# Patient Record
Sex: FEMALE | NOT HISPANIC OR LATINO | URBAN - METROPOLITAN AREA
[De-identification: names, ages, dates, MRNs, and addresses within clinical notes are randomized per-mention and may not be internally consistent; named-entity substitution may affect disease eponyms.]

---

## 2018-11-13 PROBLEM — Z00.00 ENCOUNTER FOR PREVENTIVE HEALTH EXAMINATION: Status: ACTIVE | Noted: 2018-11-13

## 2018-11-16 ENCOUNTER — OUTPATIENT (OUTPATIENT)
Dept: OUTPATIENT SERVICES | Facility: HOSPITAL | Age: 63
LOS: 1 days | End: 2018-11-16
Payer: COMMERCIAL

## 2018-11-16 ENCOUNTER — APPOINTMENT (OUTPATIENT)
Dept: CT IMAGING | Facility: CLINIC | Age: 63
End: 2018-11-16
Payer: COMMERCIAL

## 2018-11-16 DIAGNOSIS — Z00.8 ENCOUNTER FOR OTHER GENERAL EXAMINATION: ICD-10-CM

## 2018-11-16 DIAGNOSIS — C54.1 MALIGNANT NEOPLASM OF ENDOMETRIUM: ICD-10-CM

## 2018-11-16 PROCEDURE — 82565 ASSAY OF CREATININE: CPT

## 2018-11-16 PROCEDURE — 74177 CT ABD & PELVIS W/CONTRAST: CPT | Mod: 26

## 2018-11-16 PROCEDURE — 71260 CT THORAX DX C+: CPT

## 2018-11-16 PROCEDURE — 71260 CT THORAX DX C+: CPT | Mod: 26

## 2018-11-16 PROCEDURE — 74177 CT ABD & PELVIS W/CONTRAST: CPT

## 2018-11-20 ENCOUNTER — APPOINTMENT (OUTPATIENT)
Dept: GYNECOLOGIC ONCOLOGY | Facility: CLINIC | Age: 63
End: 2018-11-20
Payer: COMMERCIAL

## 2018-11-20 VITALS — HEART RATE: 103 BPM | SYSTOLIC BLOOD PRESSURE: 142 MMHG | DIASTOLIC BLOOD PRESSURE: 79 MMHG

## 2018-11-20 VITALS — BODY MASS INDEX: 27.48 KG/M2 | WEIGHT: 140 LBS | HEIGHT: 60 IN

## 2018-11-20 PROCEDURE — 99205 OFFICE O/P NEW HI 60 MIN: CPT

## 2018-11-21 ENCOUNTER — OUTPATIENT (OUTPATIENT)
Dept: OUTPATIENT SERVICES | Facility: HOSPITAL | Age: 63
LOS: 1 days | End: 2018-11-21
Payer: COMMERCIAL

## 2018-11-21 DIAGNOSIS — N93.9 ABNORMAL UTERINE AND VAGINAL BLEEDING, UNSPECIFIED: ICD-10-CM

## 2018-11-23 ENCOUNTER — RESULT REVIEW (OUTPATIENT)
Age: 63
End: 2018-11-23

## 2018-11-23 PROCEDURE — 88321 CONSLTJ&REPRT SLD PREP ELSWR: CPT

## 2018-11-28 LAB — SURGICAL PATHOLOGY STUDY: SIGNIFICANT CHANGE UP

## 2018-11-29 ENCOUNTER — APPOINTMENT (OUTPATIENT)
Dept: HEMATOLOGY ONCOLOGY | Facility: CLINIC | Age: 63
End: 2018-11-29
Payer: COMMERCIAL

## 2018-11-29 ENCOUNTER — LABORATORY RESULT (OUTPATIENT)
Age: 63
End: 2018-11-29

## 2018-11-29 VITALS
SYSTOLIC BLOOD PRESSURE: 171 MMHG | DIASTOLIC BLOOD PRESSURE: 90 MMHG | HEART RATE: 97 BPM | TEMPERATURE: 98.4 F | BODY MASS INDEX: 27.88 KG/M2 | WEIGHT: 142 LBS | HEIGHT: 60 IN

## 2018-11-29 DIAGNOSIS — Z80.0 FAMILY HISTORY OF MALIGNANT NEOPLASM OF DIGESTIVE ORGANS: ICD-10-CM

## 2018-11-29 DIAGNOSIS — Z80.3 FAMILY HISTORY OF MALIGNANT NEOPLASM OF BREAST: ICD-10-CM

## 2018-11-29 LAB
ALBUMIN SERPL ELPH-MCNC: 4.7 G/DL
ALP BLD-CCNC: 91 U/L
ALT SERPL-CCNC: 17 U/L
ANION GAP SERPL CALC-SCNC: 13 MMOL/L
AST SERPL-CCNC: 17 U/L
BILIRUB SERPL-MCNC: 0.4 MG/DL
BUN SERPL-MCNC: 10 MG/DL
CALCIUM SERPL-MCNC: 9.8 MG/DL
CANCER AG125 SERPL-ACNC: 269 U/ML
CHLORIDE SERPL-SCNC: 103 MMOL/L
CO2 SERPL-SCNC: 24 MMOL/L
CREAT SERPL-MCNC: 0.7 MG/DL
GLUCOSE SERPL-MCNC: 125 MG/DL
HCT VFR BLD CALC: 42.7 %
HGB BLD-MCNC: 13.9 G/DL
MCHC RBC-ENTMCNC: 30.8 PG
MCHC RBC-ENTMCNC: 32.6 GM/DL
MCV RBC AUTO: 94.3 FL
PLATELET # BLD AUTO: 411 K/UL
POTASSIUM SERPL-SCNC: 4.3 MMOL/L
PROT SERPL-MCNC: 7.4 G/DL
RBC # BLD: 4.52 M/UL
RBC # FLD: 11.8 %
SODIUM SERPL-SCNC: 140 MMOL/L
WBC # FLD AUTO: 8.2 K/UL

## 2018-11-29 PROCEDURE — 99205 OFFICE O/P NEW HI 60 MIN: CPT

## 2018-11-29 NOTE — ASSESSMENT
[FreeTextEntry1] : Ms. TMI and her offsprings' questions were answered to their satisfaction. She  expressed her  understanding and willingness to comply with the above recommendations, and  will return to the office to start systemic chemotherapy next week.\par

## 2018-11-29 NOTE — HISTORY OF PRESENT ILLNESS
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 In short, patient has been complaining of postmenopausal, daily, painless vaginal bleed for half a year (continues to bleed vaginally presently). Denies changes in appetite or weight, or changes in bowel movement or bladder habits. Patient has not followed up regularly with GYN, does not recall ever being tested for HPV, or having an abnormal PAP. She has never had a colonoscopy, and her last mammogram was more than 5 years ago. 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 Both pelvic ultrasound ( ) and  CT C/A/P ( ) (see case synopsis), showed a mass involving endometrium and cervix ; both studies confirming diffuse, significant retroperitoneal and pelvic lymphadenopathy, but no evidence of distant metastatic disease. Reports family history of maternal breast cancer and paternal colon cancer ( both parents lived in their 90s). Patient is , but ex-  is supportive. Continues to work as a . Accompanied by daughter, Aziza and son, Carlito. \par \par CASE SYNOPSIS:\par \par 2018:\par Cervical mass biopsy – poorly differentiated carcinoma, favoring endometrial origin.\par \par 2018:\par Pelvic US (performed at [facility]): uterus size 9.9 x 5.3x 4.4 cm, endometrial mucosa thickness 24 mm, cervix with lobular appearance and heterogenous echo texture. Adnexae unremarkable, but a heterogenous solid  mass, measuring 3.8 x 3.6 x 2.8 cm, is described adjacent to the right ovary.\par \par 2018:\par CT C/A/P: No evidence of distant metastases; significant distension endometrial cavity (16 mm in size), extending into enlarged bulky cervix, with questionable proximal vaginal extension. Significant, diffuse retroperitoneal and pelvic lymphadenopathy ( lymph nodes in right pelvis abutting the right ovary, as seen on pelvis ultrasound.\par \par 2018:\par Pathology review [facility]: poorly differentiated, endometrioid adenocarcinoma; IHC non- contributory; cannot distinguished cervical vs endometrial origin.\par  [de-identified] : poorly differentiated, endometrioid [de-identified] :  [FreeTextEntry1] : pending staging PET

## 2018-11-29 NOTE — OB HISTORY
[Definite:  ___ (Date)] : the last menstrual period was [unfilled] [Currently In Menopause] : currently in menopause [Menopause Age: ____] : age at menopause was [unfilled] [___] : Living: [unfilled]

## 2018-11-29 NOTE — REVIEW OF SYSTEMS
[Chest Pain] : no chest pain [Lower Ext Edema] : no lower extremity edema [Shortness Of Breath] : no shortness of breath [Cough] : no cough [Abdominal Pain] : no abdominal pain [Vomiting] : no vomiting [Dysmenorrhea/Abn Vaginal Bleeding] : dysmenorrhea/abnormal vaginal bleeding [Skin Rash] : no skin rash [Insomnia] : insomnia [Anxiety] : anxiety [Easy Bleeding] : no tendency for easy bleeding [Easy Bruising] : no tendency for easy bruising [Negative] : Heme/Lymph [de-identified] : cannot sleep since diagnosis due to anxiety

## 2018-11-29 NOTE — REASON FOR VISIT
[Initial Consultation] : an initial consultation [Family Member] : family member [FreeTextEntry2] : endometriod uterine cancer

## 2018-11-30 ENCOUNTER — OTHER (OUTPATIENT)
Age: 63
End: 2018-11-30

## 2018-11-30 LAB — FERRITIN SERPL-MCNC: 376 NG/ML

## 2018-12-03 PROBLEM — N93.9 VAGINAL BLEEDING, ABNORMAL: Status: RESOLVED | Noted: 2018-11-29 | Resolved: 2018-12-03

## 2018-12-03 PROBLEM — R93.89 THICKENED ENDOMETRIUM: Status: RESOLVED | Noted: 2018-11-20 | Resolved: 2018-12-03

## 2018-12-04 ENCOUNTER — APPOINTMENT (OUTPATIENT)
Dept: HEMATOLOGY ONCOLOGY | Facility: CLINIC | Age: 63
End: 2018-12-04

## 2018-12-04 ENCOUNTER — LABORATORY RESULT (OUTPATIENT)
Age: 63
End: 2018-12-04

## 2018-12-04 VITALS
DIASTOLIC BLOOD PRESSURE: 77 MMHG | HEART RATE: 106 BPM | TEMPERATURE: 98.7 F | HEIGHT: 60 IN | SYSTOLIC BLOOD PRESSURE: 145 MMHG

## 2018-12-04 LAB
HCT VFR BLD CALC: 40.8 %
HGB BLD-MCNC: 13.7 G/DL
MCHC RBC-ENTMCNC: 31 PG
MCHC RBC-ENTMCNC: 33.6 GM/DL
MCV RBC AUTO: 92.3 FL
PLATELET # BLD AUTO: 422 K/UL
RBC # BLD: 4.42 M/UL
RBC # FLD: 11.7 %
WBC # FLD AUTO: 10.4 K/UL

## 2018-12-05 LAB
ERYTHROCYTE [SEDIMENTATION RATE] IN BLOOD BY WESTERGREN METHOD: 21 MM/HR
FOLATE SERPL-MCNC: 10.7 NG/ML
IRON SATN MFR SERPL: 22 %
IRON SERPL-MCNC: 56 UG/DL
TIBC SERPL-MCNC: 255 UG/DL
UIBC SERPL-MCNC: 199 UG/DL
VIT B12 SERPL-MCNC: 234 PG/ML

## 2018-12-06 ENCOUNTER — APPOINTMENT (OUTPATIENT)
Dept: INFUSION THERAPY | Facility: CLINIC | Age: 63
End: 2018-12-06
Payer: COMMERCIAL

## 2018-12-06 VITALS
TEMPERATURE: 98.2 F | BODY MASS INDEX: 27.68 KG/M2 | HEIGHT: 60 IN | SYSTOLIC BLOOD PRESSURE: 162 MMHG | HEART RATE: 130 BPM | WEIGHT: 141 LBS | DIASTOLIC BLOOD PRESSURE: 94 MMHG

## 2018-12-06 DIAGNOSIS — R93.89 ABNORMAL FINDINGS ON DIAGNOSTIC IMAGING OF OTHER SPECIFIED BODY STRUCTURES: ICD-10-CM

## 2018-12-06 DIAGNOSIS — N93.9 ABNORMAL UTERINE AND VAGINAL BLEEDING, UNSPECIFIED: ICD-10-CM

## 2018-12-06 LAB — 25(OH)D3 SERPL-MCNC: 12.5 NG/ML

## 2018-12-06 PROCEDURE — 96417 CHEMO IV INFUS EACH ADDL SEQ: CPT

## 2018-12-06 PROCEDURE — 96413 CHEMO IV INFUSION 1 HR: CPT

## 2018-12-06 PROCEDURE — 96375 TX/PRO/DX INJ NEW DRUG ADDON: CPT

## 2018-12-06 PROCEDURE — 96367 TX/PROPH/DG ADDL SEQ IV INF: CPT

## 2018-12-06 PROCEDURE — 96415 CHEMO IV INFUSION ADDL HR: CPT

## 2018-12-06 PROCEDURE — 96372 THER/PROPH/DIAG INJ SC/IM: CPT | Mod: 59

## 2018-12-17 ENCOUNTER — APPOINTMENT (OUTPATIENT)
Dept: INFUSION THERAPY | Facility: CLINIC | Age: 63
End: 2018-12-17

## 2018-12-17 ENCOUNTER — APPOINTMENT (OUTPATIENT)
Dept: HEMATOLOGY ONCOLOGY | Facility: CLINIC | Age: 63
End: 2018-12-17
Payer: COMMERCIAL

## 2018-12-17 ENCOUNTER — LABORATORY RESULT (OUTPATIENT)
Age: 63
End: 2018-12-17

## 2018-12-17 VITALS
BODY MASS INDEX: 28.07 KG/M2 | TEMPERATURE: 98.3 F | WEIGHT: 143 LBS | DIASTOLIC BLOOD PRESSURE: 85 MMHG | HEIGHT: 60 IN | HEART RATE: 96 BPM | SYSTOLIC BLOOD PRESSURE: 166 MMHG

## 2018-12-17 DIAGNOSIS — I87.8 OTHER SPECIFIED DISORDERS OF VEINS: ICD-10-CM

## 2018-12-17 DIAGNOSIS — K21.9 GASTRO-ESOPHAGEAL REFLUX DISEASE W/OUT ESOPHAGITIS: ICD-10-CM

## 2018-12-17 LAB
HCT VFR BLD CALC: 39.2 %
HGB BLD-MCNC: 12.9 G/DL
MCHC RBC-ENTMCNC: 31 PG
MCHC RBC-ENTMCNC: 33 GM/DL
MCV RBC AUTO: 93.9 FL
PLATELET # BLD AUTO: 362 K/UL
RBC # BLD: 4.18 M/UL
RBC # FLD: 12.1 %
WBC # FLD AUTO: 18.5 K/UL

## 2018-12-17 PROCEDURE — 36415 COLL VENOUS BLD VENIPUNCTURE: CPT

## 2018-12-17 PROCEDURE — 96365 THER/PROPH/DIAG IV INF INIT: CPT

## 2018-12-17 PROCEDURE — 85025 COMPLETE CBC W/AUTO DIFF WBC: CPT

## 2018-12-17 PROCEDURE — 99215 OFFICE O/P EST HI 40 MIN: CPT | Mod: 25

## 2018-12-18 NOTE — PHYSICAL EXAM
[Normal] : affect appropriate [de-identified] : anicteric [de-identified] : no thrush or mucositis [de-identified] : no lymphadenopathy [de-identified] : S1S2 RRR,no obvious murmurs [de-identified] : no c/c/e [de-identified] : diffuse generalized rash, nonblanching, macular but confluent in spots - especially extremities causing sunburn like areas.  Of not confluent area was noted where IV was placed on left wrist and where OnPro device was placed on left upper arm.  Face spared although reddened cheeks (from Decadron).

## 2018-12-18 NOTE — RESULTS/DATA
[FreeTextEntry1] : WBC: 18.5  ANC: 15.1  Hgb: 12.9  Hct: 39.2   MCV: 93.9   Plts: 362\par segs: 58%  bands: 17%  (Neulasta effect)  lymphs: 11%  Eos: 3%\par CMP: pending

## 2018-12-18 NOTE — REVIEW OF SYSTEMS
[Patient Intake Form Reviewed] : Patient intake form was reviewed [Fatigue] : fatigue [Negative] : Allergic/Immunologic [FreeTextEntry9] : GCSF bone pains as in interval history [de-identified] : itchy rash as in interval history

## 2018-12-18 NOTE — HISTORY OF PRESENT ILLNESS
[Disease: _____________________] : Disease: [unfilled] [T: ___] : T[unfilled] [N: ___] : N[unfilled] [AJCC Stage: ____] : AJCC Stage: [unfilled] [de-identified] : STACEY TIM is a 62 y.o. who we are following for a stage IIIC endometrial cancer. \par 11/7/18 -The patient presented with a 4 - 5 month hx of PMB and saw GYN Dr. Montes De Oca (prior GYN visit >9 years ago). Exam showed large cervical mass, biopsy revealed a poorly differentiated carcinoma favoring endometrial origin.   [facility] review of slides favored endometrioid adenocarcinoma, but could not distinguish between endometrial or cervical carcinoma. \par 11/16/18 - CT C/A/P - No distant mets; significant distention endometrial cavity (16mm) extending into an enlarged bulky cervix, with questionable proximal vaginal extension.  Significant, diffuse retroperitoneal and pelvic LAD (LN's in right pelvis abutting the right ovary).\par 12/6/18 - Started chemotherapy with Taxol/Carbo Q21 [de-identified] : poorly differentiated carcinoma - favor endometrioid adenocarcinoma, but could not distinguish between endometrial or cervical carcinoma.  [de-identified] : [facility] sent 12/6/18 - Pending [de-identified] : The patient reports chemo initially went as expected.  She did well on D1.  Went to work on D2 and then was tired that evening.  D3, D4 and D5 she had GCSF induced bone pains that were quite bad.  Even had in ribs to the point where it was hard to take a deep breath for a little bit.  She did try Motrin but didn't help much.  She started feeling better by about D5, however about a week later on D7 she noticed an itchy rash developing.  On D9 (Saturday) she called service and spoke with Dr. Perez - had some Decadron at home (for Taxol premeds) and was told to take two 4mg tabs BID Sat and Sun.  She also took some Benadryl.  She states this did help the itching but the rash did get worse.  She was not sure what to do for the steroids today  - was told to come in to the office for evaluation.\par She did not have any problems with nausea/vomiting.  She did have some mild constipation immediately after the chemo - resolved with Miralax.  No further bowel issues. \par She denies any other changes in her family, medical, or social history since her last visit of 12/4/18.

## 2018-12-18 NOTE — REASON FOR VISIT
[Follow-Up Visit] : a follow-up [Urgent Visit] : an urgent  [Other: _____] : [unfilled] [FreeTextEntry2] : Endometrial Cancer - D11 C1 Taxol/Carbo - new rash

## 2018-12-18 NOTE — ASSESSMENT
[FreeTextEntry1] : The patient is a 63 y.o. with a stage IIIC endometrial carcinoma (presumably endometrioid adenocarcinoma, but could not r/o cervical primary).  Has significant retroperitoneal and pelvic lymphadenopathy.\par 12/6/18 - Started chemo with Taxol/Carbo Q21 - plan was to see how she responded.  If good response then try for definitive chemo/RT - however LAD too extensive at this time. \par Patient initially tolerated chemo as expected - major issue pain due to Neulasta, however ~ 1 week later she developed a diffuse generalized rash requiring steroids.\par 1. Derm - Rash quite impressive.  Patient seen along with Dr. Greer.  Photos taken.  Covers a large portion of her body - most of arms and thighs, scattered on chest/abdomen.  Also appears very red now.  \par Does appear to be c/w a drug rash - most likely culprit is Taxol. \par Review of literature suggests this is an exanthematous type of drug reaction.\par - "Cutaneous eruption that mimics a measles-like viral exanthem."\par  -"Sensitization occurs during administration or after completing course of drug; peak incidence at ninth day after administration. "\par - "Usually quite pruritic. Painful skin lesions suggest development of a more serious ACDR, such as toxic epidermal necrolysis (TEN)."\par - "In previously sensitized patient, eruption starts within 2 or 3 days after readministration of drug."\par - "Skin Lesions: Macules and/or papules, a few millimeters to 1 cm in size. Bright or “drug” red. Resolving lesions have hues of tan and purple. In time, lesions become confluent forming large macules, polycyclic/gyrate erythema, reticular eruptions, sheet-like erythema, erythroderma; also erythema multiforme-like.  Scaling and/or desquamation may occur with healing."\par - "Distribution: Almost always on trunk and extremities. Confluent lesions in intertriginous areas, i.e., axilla, groin, inframammary area. Palms and soles variably involved. "\par [email or web address]\par \par Patient s/p Decadron 8mg PO BID x 2 days.  Was kept today and given Solumedrol 100mg IV in the office as well as Pepcid 20mg IV.  Was given prescription for Prednisone 20mg tabs - instructed to take TID x 5 days for now.  Was also give Rx for Pepcid 40mg QD. (As opposed to PPI since Pepcid may also help with allergic reactions as an H2 blocker). Patient instructed she must call if she develops new lesions or if these lesions begin to peel.\par \par 2. Onc - Pt on Taxol/Carbo.  As above there is concern that Taxol is the offending agent.  She is due for C#2 on 12/27/18.  At this point we will need to think about our strategy - we also want to see how the rash resolves 1st as well.  If she is allergic to Taxol we can try Abraxane, however it possible she is allergic to the actual Taxane and not the solvent - if this is the case then the Abraxane would cause the rash as well.  One possible strategy is to change to weekly Taxane dosing - she would then get less in and steroid premeds weekly.  \par 3. GCSF bone pains - Her WBC/ANC was excellent.  If we stick with Q21 dosing we can give Neulasta 3mg after her next cycle - this will reduce her bone pains.  If we change her to weekly then she will likely not need growth factor.  There is a small chance Neulasta can be the cause of the rash - will investigate further. \par 4. B12 deficiency - Prechemo B12 level 234, .  She was started on SQ B12 with C1 chemo - will give with each cycle or monthly - do not want to have patient develop neuropathies from low B12 since at risk from Taxol induced neuropathies.  For completeness sake will also check IF and APCA level.  \par 5. Elevated Ca125 - Son was interested in when we would recheck - told too soon now since only 1 week post 1st cycle  - most likely with PE on C#2.  \par 6. Poor venous access - The patient required 3 attempts before a IV was established today - per RN she should get a port placed.  Patient still reluctant - would like to try C#2 peripherally.  I am concerned, however, that with her rash she may need a guaranteed line  -  upper chest wall was relatively spared.  She may also need a port if we decide to change to weekly therapy.  Will await our final treatment decision - we may need to have her get a port placed.\par C#2 tentatively scheduled for 12/17/18 (Thursday).  Based on our new strategy we may move to 12/31/18 (Monday) or earlier in the week so the office will be open for us to assess her should she develop problems later in the week. \par Will contact the patient once we have determined our new strategy to clarify appointments/premeds, etc.  Again patient aware to call us sooner should rash/symptoms worsen. \par \par Dr. Job Montes De Oca\par Dr. Olivia Alvarez\par Dr. Shabnam Tapia

## 2018-12-27 ENCOUNTER — LABORATORY RESULT (OUTPATIENT)
Age: 63
End: 2018-12-27

## 2018-12-27 ENCOUNTER — APPOINTMENT (OUTPATIENT)
Dept: INFUSION THERAPY | Facility: CLINIC | Age: 63
End: 2018-12-27
Payer: COMMERCIAL

## 2018-12-27 VITALS
BODY MASS INDEX: 27.29 KG/M2 | TEMPERATURE: 98.9 F | HEIGHT: 60 IN | WEIGHT: 139 LBS | DIASTOLIC BLOOD PRESSURE: 91 MMHG | SYSTOLIC BLOOD PRESSURE: 157 MMHG | HEART RATE: 111 BPM

## 2018-12-27 PROCEDURE — 96375 TX/PRO/DX INJ NEW DRUG ADDON: CPT

## 2018-12-27 PROCEDURE — 36415 COLL VENOUS BLD VENIPUNCTURE: CPT

## 2018-12-27 PROCEDURE — 85025 COMPLETE CBC W/AUTO DIFF WBC: CPT

## 2018-12-27 PROCEDURE — 96372 THER/PROPH/DIAG INJ SC/IM: CPT | Mod: 59

## 2018-12-27 PROCEDURE — 96367 TX/PROPH/DG ADDL SEQ IV INF: CPT

## 2018-12-27 PROCEDURE — 96413 CHEMO IV INFUSION 1 HR: CPT

## 2018-12-27 PROCEDURE — 96417 CHEMO IV INFUS EACH ADDL SEQ: CPT

## 2018-12-28 LAB
ALBUMIN SERPL ELPH-MCNC: 4.4 G/DL
ALP BLD-CCNC: 87 U/L
ALT SERPL-CCNC: 33 U/L
ANION GAP SERPL CALC-SCNC: 14 MMOL/L
AST SERPL-CCNC: 16 U/L
BILIRUB SERPL-MCNC: 0.5 MG/DL
BUN SERPL-MCNC: 10 MG/DL
CALCIUM SERPL-MCNC: 9.4 MG/DL
CHLORIDE SERPL-SCNC: 100 MMOL/L
CO2 SERPL-SCNC: 24 MMOL/L
CREAT SERPL-MCNC: 0.6 MG/DL
GLUCOSE SERPL-MCNC: 123 MG/DL
HCT VFR BLD CALC: 38.8 %
HGB BLD-MCNC: 12.7 G/DL
MCHC RBC-ENTMCNC: 30.9 PG
MCHC RBC-ENTMCNC: 32.8 GM/DL
MCV RBC AUTO: 94.2 FL
PLATELET # BLD AUTO: 554 K/UL
POTASSIUM SERPL-SCNC: 4.3 MMOL/L
PROT SERPL-MCNC: 6.9 G/DL
RBC # BLD: 4.12 M/UL
RBC # FLD: 12.9 %
SODIUM SERPL-SCNC: 138 MMOL/L
WBC # FLD AUTO: 11.3 K/UL

## 2018-12-30 LAB — IF BLOCK AB SER QL: NORMAL

## 2019-01-03 ENCOUNTER — APPOINTMENT (OUTPATIENT)
Age: 64
End: 2019-01-03
Payer: COMMERCIAL

## 2019-01-03 ENCOUNTER — LABORATORY RESULT (OUTPATIENT)
Age: 64
End: 2019-01-03

## 2019-01-03 VITALS
HEART RATE: 106 BPM | TEMPERATURE: 98.7 F | BODY MASS INDEX: 27.48 KG/M2 | HEIGHT: 60 IN | DIASTOLIC BLOOD PRESSURE: 80 MMHG | SYSTOLIC BLOOD PRESSURE: 160 MMHG | WEIGHT: 140 LBS

## 2019-01-03 LAB
HCT VFR BLD CALC: 34.1 %
HGB BLD-MCNC: 11.2 G/DL
MCHC RBC-ENTMCNC: 30.8 PG
MCHC RBC-ENTMCNC: 32.8 GM/DL
MCV RBC AUTO: 93.8 FL
PLATELET # BLD AUTO: 301 K/UL
RBC # BLD: 3.64 M/UL
RBC # FLD: 12.2 %
WBC # FLD AUTO: 6.7 K/UL

## 2019-01-03 PROCEDURE — 36415 COLL VENOUS BLD VENIPUNCTURE: CPT

## 2019-01-03 PROCEDURE — 96367 TX/PROPH/DG ADDL SEQ IV INF: CPT

## 2019-01-03 PROCEDURE — 96375 TX/PRO/DX INJ NEW DRUG ADDON: CPT

## 2019-01-03 PROCEDURE — 96413 CHEMO IV INFUSION 1 HR: CPT

## 2019-01-03 PROCEDURE — 85025 COMPLETE CBC W/AUTO DIFF WBC: CPT

## 2019-01-03 PROCEDURE — 96417 CHEMO IV INFUS EACH ADDL SEQ: CPT

## 2019-01-09 PROBLEM — M89.8X9 BONE PAIN DUE TO G-CSF: Status: RESOLVED | Noted: 2018-12-17 | Resolved: 2019-01-09

## 2019-01-09 PROBLEM — Z87.2 HISTORY OF DRUG RASH: Status: RESOLVED | Noted: 2018-12-17 | Resolved: 2019-01-09

## 2019-01-10 ENCOUNTER — APPOINTMENT (OUTPATIENT)
Age: 64
End: 2019-01-10

## 2019-01-10 ENCOUNTER — LABORATORY RESULT (OUTPATIENT)
Age: 64
End: 2019-01-10

## 2019-01-10 ENCOUNTER — APPOINTMENT (OUTPATIENT)
Dept: HEMATOLOGY ONCOLOGY | Facility: CLINIC | Age: 64
End: 2019-01-10
Payer: COMMERCIAL

## 2019-01-10 VITALS
BODY MASS INDEX: 27.88 KG/M2 | HEART RATE: 106 BPM | HEIGHT: 60 IN | TEMPERATURE: 98.4 F | WEIGHT: 142 LBS | DIASTOLIC BLOOD PRESSURE: 88 MMHG | SYSTOLIC BLOOD PRESSURE: 161 MMHG

## 2019-01-10 DIAGNOSIS — M89.8X9 OTHER SPECIFIED DISORDERS OF BONE, UNSPECIFIED SITE: ICD-10-CM

## 2019-01-10 DIAGNOSIS — Z87.2 PERSONAL HISTORY OF DISEASES OF THE SKIN AND SUBCUTANEOUS TISSUE: ICD-10-CM

## 2019-01-10 LAB
HCT VFR BLD CALC: 32.2 %
HGB BLD-MCNC: 11 G/DL
MCHC RBC-ENTMCNC: 32 PG
MCHC RBC-ENTMCNC: 34.1 GM/DL
MCV RBC AUTO: 93.7 FL
PLATELET # BLD AUTO: 407 K/UL
RBC # BLD: 3.44 M/UL
RBC # FLD: 13.5 %
WBC # FLD AUTO: 4 K/UL

## 2019-01-10 PROCEDURE — 96417 CHEMO IV INFUS EACH ADDL SEQ: CPT

## 2019-01-10 PROCEDURE — 96367 TX/PROPH/DG ADDL SEQ IV INF: CPT

## 2019-01-10 PROCEDURE — 85025 COMPLETE CBC W/AUTO DIFF WBC: CPT

## 2019-01-10 PROCEDURE — 36415 COLL VENOUS BLD VENIPUNCTURE: CPT

## 2019-01-10 PROCEDURE — 96413 CHEMO IV INFUSION 1 HR: CPT

## 2019-01-10 PROCEDURE — 99213 OFFICE O/P EST LOW 20 MIN: CPT | Mod: 25

## 2019-01-10 RX ORDER — FAMOTIDINE 40 MG/1
40 TABLET, FILM COATED ORAL DAILY
Qty: 90 | Refills: 3 | Status: DISCONTINUED | COMMUNITY
Start: 2018-12-17 | End: 2019-01-10

## 2019-01-10 RX ORDER — PREDNISONE 20 MG/1
20 TABLET ORAL
Qty: 90 | Refills: 0 | Status: DISCONTINUED | COMMUNITY
Start: 2018-12-17 | End: 2019-01-10

## 2019-01-10 RX ORDER — DEXAMETHASONE 4 MG/1
4 TABLET ORAL
Qty: 60 | Refills: 1 | Status: DISCONTINUED | COMMUNITY
Start: 2018-12-04 | End: 2019-01-10

## 2019-01-10 NOTE — REVIEW OF SYSTEMS
[Patient Intake Form Reviewed] : Patient intake form was reviewed [Fatigue] : fatigue [Depression] : depression [Negative] : Allergic/Immunologic [de-identified] : nervousness, sleep problems

## 2019-01-10 NOTE — REASON FOR VISIT
[Follow-Up Visit] : a follow-up [Post Treatment Visit] : a post treatment [FreeTextEntry2] : Endometrial Cancer - D15 C2 Taxol/Carbo

## 2019-01-10 NOTE — ASSESSMENT
[FreeTextEntry1] : The patient is a 63 y.o. with a stage IIIC endometrial carcinoma (presumably endometrioid adenocarcinoma, but could not r/o cervical primary).  Has significant retroperitoneal and pelvic lymphadenopathy.\par 12/6/18 - Started chemo with Taxol/Carbo Q21 - plan was to see how she responded.  If good response then try for definitive chemo/RT - however LAD too extensive at this time. \par 1 week post chemo patient developed a diffuse generalized rash requiring steroids. Taxol was felt to be the offending agent.  Her regime was changed to weekly therapy instead of Q21 in order to give a lower dose at a time with more premeds.  Patient has not has the rash return.  Now patient mentions that her daughter has also had a generalized body rash.  Now it it sounding more viral, less drug.  Will start reducing premeds.  Will eliminate Benadryl today and see how she does. \par 2. B12 deficiency - Prechemo B12 level 234, .  IF and Antiparietal cell antibodies negative.  Will continue with B12 Qmonth, but patient also instructed to start PO B12 daily. \par 3. Elevated Ca125 - Will recheck level D8 C#3.\par C#3 to start 1/24/19, PE on 2/7/19. \par \par Dr. Job Montes De Oca\par Dr. Olivia Alvarez\par Dr. Shabnam Tapia

## 2019-01-10 NOTE — PHYSICAL EXAM
[Normal] : affect appropriate [de-identified] : anicteric [de-identified] : no thrush or mucositis [de-identified] : no lymphadenopathy [de-identified] : S1S2 RRR,no obvious murmurs [de-identified] : no c/c/e [de-identified] : no rashes

## 2019-01-10 NOTE — HISTORY OF PRESENT ILLNESS
[Disease: _____________________] : Disease: [unfilled] [T: ___] : T[unfilled] [N: ___] : N[unfilled] [AJCC Stage: ____] : AJCC Stage: [unfilled] [de-identified] : STACEY TIM is a 62 y.o. who we are following for a stage IIIC endometrial cancer. \par 11/7/18 -The patient presented with a 4 - 5 month hx of PMB and saw GYN Dr. Montes De Oca (prior GYN visit >9 years ago). Exam showed large cervical mass, biopsy revealed a poorly differentiated carcinoma favoring endometrial origin.   [facility] review of slides favored endometrioid adenocarcinoma, but could not distinguish between endometrial or cervical carcinoma. \par 11/16/18 - CT C/A/P - No distant mets; significant distention endometrial cavity (16mm) extending into an enlarged bulky cervix, with questionable proximal vaginal extension.  Significant, diffuse retroperitoneal and pelvic LAD (LN's in right pelvis abutting the right ovary).\par 12/6/18 - Started chemotherapy with Taxol/Carbo Q21, however on D7 she developed an itchy, generalized, nonblanching, macular rash that was very confluent in spots, especially extremities, causing sunburnt like areas.  Resolved after prednisone.  Due to a concern about this being a reaction to Taxol, her therapy was changed to weekly Taxol with heavy premeds each week.   [de-identified] : poorly differentiated carcinoma - favor endometrioid adenocarcinoma, but could not distinguish between endometrial or cervical carcinoma.  [de-identified] : 1/9/19 - [facility] - MSI high, TMB 34 -high, else no reportable alterations. Multiple other alterations - see report.  [de-identified] : The patient reports that she subsequently found out that her daughter had a similar rash and is still being evaluated for it.  She has not had any new rashes since restarting on the chemo.  \par She has had no side effects with the weekly therapy.  She denies any neuropathies. \par She denies any other changes in her family, medical, or social history since her last visit of 12/17/18.

## 2019-01-13 ENCOUNTER — RX RENEWAL (OUTPATIENT)
Age: 64
End: 2019-01-13

## 2019-01-23 ENCOUNTER — OTHER (OUTPATIENT)
Age: 64
End: 2019-01-23

## 2019-01-23 LAB — PCA AB SER QL IF: NORMAL

## 2019-01-24 ENCOUNTER — APPOINTMENT (OUTPATIENT)
Age: 64
End: 2019-01-24
Payer: COMMERCIAL

## 2019-01-24 ENCOUNTER — LABORATORY RESULT (OUTPATIENT)
Age: 64
End: 2019-01-24

## 2019-01-24 VITALS
TEMPERATURE: 98 F | HEIGHT: 60 IN | SYSTOLIC BLOOD PRESSURE: 169 MMHG | WEIGHT: 143 LBS | DIASTOLIC BLOOD PRESSURE: 76 MMHG | BODY MASS INDEX: 28.07 KG/M2 | HEART RATE: 101 BPM

## 2019-01-24 LAB
HCT VFR BLD CALC: 35.9 %
HGB BLD-MCNC: 12.2 G/DL
MCHC RBC-ENTMCNC: 32.3 PG
MCHC RBC-ENTMCNC: 34.1 GM/DL
MCV RBC AUTO: 94.8 FL
PLATELET # BLD AUTO: 404 K/UL
RBC # BLD: 3.78 M/UL
RBC # FLD: 14.7 %
WBC # FLD AUTO: 5.1 K/UL

## 2019-01-24 PROCEDURE — 85025 COMPLETE CBC W/AUTO DIFF WBC: CPT

## 2019-01-24 PROCEDURE — 96372 THER/PROPH/DIAG INJ SC/IM: CPT | Mod: 59

## 2019-01-24 PROCEDURE — 96367 TX/PROPH/DG ADDL SEQ IV INF: CPT

## 2019-01-24 PROCEDURE — 36415 COLL VENOUS BLD VENIPUNCTURE: CPT

## 2019-01-24 PROCEDURE — 96413 CHEMO IV INFUSION 1 HR: CPT

## 2019-01-24 PROCEDURE — 96417 CHEMO IV INFUS EACH ADDL SEQ: CPT

## 2019-01-31 ENCOUNTER — APPOINTMENT (OUTPATIENT)
Age: 64
End: 2019-01-31
Payer: COMMERCIAL

## 2019-01-31 ENCOUNTER — LABORATORY RESULT (OUTPATIENT)
Age: 64
End: 2019-01-31

## 2019-01-31 VITALS
HEART RATE: 99 BPM | BODY MASS INDEX: 27.88 KG/M2 | WEIGHT: 142 LBS | DIASTOLIC BLOOD PRESSURE: 79 MMHG | SYSTOLIC BLOOD PRESSURE: 172 MMHG | TEMPERATURE: 98 F | HEIGHT: 60 IN

## 2019-01-31 LAB
CANCER AG125 SERPL-ACNC: 56 U/ML
HCT VFR BLD CALC: 34.8 %
HGB BLD-MCNC: 12 G/DL
MCHC RBC-ENTMCNC: 32.5 PG
MCHC RBC-ENTMCNC: 34.6 GM/DL
MCV RBC AUTO: 94.1 FL
PLATELET # BLD AUTO: 344 K/UL
RBC # BLD: 3.69 M/UL
RBC # FLD: 13.7 %
WBC # FLD AUTO: 5.7 K/UL

## 2019-01-31 PROCEDURE — 96417 CHEMO IV INFUS EACH ADDL SEQ: CPT

## 2019-01-31 PROCEDURE — 96413 CHEMO IV INFUSION 1 HR: CPT

## 2019-01-31 PROCEDURE — 96367 TX/PROPH/DG ADDL SEQ IV INF: CPT

## 2019-01-31 PROCEDURE — 36415 COLL VENOUS BLD VENIPUNCTURE: CPT

## 2019-01-31 PROCEDURE — 85025 COMPLETE CBC W/AUTO DIFF WBC: CPT

## 2019-02-01 LAB
ALBUMIN SERPL ELPH-MCNC: 4.8 G/DL
ALP BLD-CCNC: 91 U/L
ALT SERPL-CCNC: 19 U/L
ANION GAP SERPL CALC-SCNC: 13 MMOL/L
AST SERPL-CCNC: 18 U/L
BILIRUB SERPL-MCNC: 0.4 MG/DL
BUN SERPL-MCNC: 12 MG/DL
CALCIUM SERPL-MCNC: 9.8 MG/DL
CHLORIDE SERPL-SCNC: 101 MMOL/L
CO2 SERPL-SCNC: 23 MMOL/L
CREAT SERPL-MCNC: 0.6 MG/DL
GLUCOSE SERPL-MCNC: 103 MG/DL
POTASSIUM SERPL-SCNC: 4.6 MMOL/L
PROT SERPL-MCNC: 6.9 G/DL
SODIUM SERPL-SCNC: 137 MMOL/L

## 2019-02-07 ENCOUNTER — APPOINTMENT (OUTPATIENT)
Age: 64
End: 2019-02-07
Payer: COMMERCIAL

## 2019-02-07 ENCOUNTER — LABORATORY RESULT (OUTPATIENT)
Age: 64
End: 2019-02-07

## 2019-02-07 VITALS
DIASTOLIC BLOOD PRESSURE: 82 MMHG | TEMPERATURE: 98 F | SYSTOLIC BLOOD PRESSURE: 178 MMHG | BODY MASS INDEX: 28.27 KG/M2 | HEART RATE: 97 BPM | HEIGHT: 60 IN | WEIGHT: 144 LBS

## 2019-02-07 LAB
HCT VFR BLD CALC: 34.4 %
HGB BLD-MCNC: 11.6 G/DL
MCHC RBC-ENTMCNC: 32.4 PG
MCHC RBC-ENTMCNC: 33.9 GM/DL
MCV RBC AUTO: 95.6 FL
PLATELET # BLD AUTO: 380 K/UL
RBC # BLD: 3.59 M/UL
RBC # FLD: 14.4 %
WBC # FLD AUTO: 4 K/UL

## 2019-02-07 PROCEDURE — 96417 CHEMO IV INFUS EACH ADDL SEQ: CPT

## 2019-02-07 PROCEDURE — 85025 COMPLETE CBC W/AUTO DIFF WBC: CPT

## 2019-02-07 PROCEDURE — 36415 COLL VENOUS BLD VENIPUNCTURE: CPT

## 2019-02-07 PROCEDURE — 96367 TX/PROPH/DG ADDL SEQ IV INF: CPT

## 2019-02-07 PROCEDURE — 96413 CHEMO IV INFUSION 1 HR: CPT

## 2019-02-07 PROCEDURE — 99213 OFFICE O/P EST LOW 20 MIN: CPT | Mod: 25

## 2019-02-14 NOTE — PHYSICAL EXAM
[Normal] : affect appropriate [de-identified] : anicteric [de-identified] : no thrush or mucositis [de-identified] : no lymphadenopathy [de-identified] : S1S2 RRR,no obvious murmurs [de-identified] : no c/c/e [de-identified] : no rashes

## 2019-02-14 NOTE — REASON FOR VISIT
[Follow-Up Visit] : a follow-up [Post Treatment Visit] : a post treatment [Family Member] : family member [FreeTextEntry2] : Endometrial Cancer - D15 C3 Taxol/Carbo

## 2019-02-14 NOTE — ASSESSMENT
[FreeTextEntry1] : The patient is a 63 y.o. with a stage IIIC endometrial carcinoma (presumably endometrioid adenocarcinoma, but could not r/o cervical primary). Significant retroperitoneal and pelvic lymphadenopathy.\par 12/6/18 - Started chemo with Taxol/Carbo Q21 - plan was to see how she responded.  If good response then try for definitive chemo/RT - however LAD too extensive at this time. \par 1. Onc - 1 week post chemo patient developed a diffuse generalized rash requiring steroids. Initially felt to be from Taxol - regime changed to weekly with more premeds, whoever then patient reported her daughter had rash as well.  Subsequently felt to be viral and weaned off additional premeds.  Now tolerating very well.  \par Will continue on current regime. \par 2. B12 deficiency - Prechemo B12 level 234, .  IF and Antiparietal cell antibodies negative.  Will continue with B12 Qmonth, but patient also instructed to start PO B12 daily. \par 3. Elevated Ca125 - Will recheck level D1 C#4.  Has come down nicely.\par 4. Vitamin D deficiency - Patient has been on for 3 months - renewal given.  Will recheck level on next visit.\par 5. Insomnia - Patient using only 1/2 of a 0.25 Xanax PRN to help sleep at night.  Encouraged to use a whole tab or even 2 tabs if needed.  Reviewed mechanism of action.  \par C#4 to start 2/21/19, PE on 2/28/19. \par \par Dr. Job Montes De Oca\par Dr. Olivia Alvarez\par Dr. Shabnam Tapia

## 2019-02-14 NOTE — REVIEW OF SYSTEMS
[Patient Intake Form Reviewed] : Patient intake form was reviewed [Fatigue] : fatigue [Negative] : Allergic/Immunologic [Anxiety] : anxiety [de-identified] : nervousness, sleep problems

## 2019-02-14 NOTE — HISTORY OF PRESENT ILLNESS
[Disease: _____________________] : Disease: [unfilled] [T: ___] : T[unfilled] [N: ___] : N[unfilled] [AJCC Stage: ____] : AJCC Stage: [unfilled] [de-identified] : STACEY TIM is a 63 y.o. who we are following for a stage IIIC endometrial cancer. \par 11/7/18 -The patient presented with a 4 - 5 month hx of PMB and saw GYN Dr. Montes De Oca (prior GYN visit >9 years ago). Exam showed large cervical mass, biopsy revealed a poorly differentiated carcinoma favoring endometrial origin.   [facility] review of slides favored endometrioid adenocarcinoma, but could not distinguish between endometrial or cervical carcinoma. \par 11/16/18 - CT C/A/P - No distant mets; significant distention endometrial cavity (16mm) extending into an enlarged bulky cervix, with questionable proximal vaginal extension.  Significant, diffuse retroperitoneal and pelvic LAD (LN's in right pelvis abutting the right ovary).\par 12/6/18 - Started chemotherapy with Taxol/Carbo Q21, however on D7 she developed an itchy, generalized, nonblanching, macular rash that was very confluent in spots, especially extremities, causing sunburnt like areas.  Resolved after prednisone.  Due to a concern about this being a reaction to Taxol, her therapy was changed to weekly Taxol with heavy premeds each week.  Patient then reported her daughter had a similar rash.  Was felt to be viral.  Weaned off additional premeds by mid-January.  \par Responding well - Ca125 11/29/18 269 - down to 56 by 1/31/19 (D8 C#3). [de-identified] : poorly differentiated carcinoma - favor endometrioid adenocarcinoma, but could not distinguish between endometrial or cervical carcinoma.  [de-identified] : 1/9/19 - [facility] - MSI high, TMB 34 -high, else no reportable alterations. Multiple other alterations - see report.  [de-identified] : The patient reports that ever since we switched to weekly she has been doing very well.  \par She has had no side effects with the weekly therapy.  She denies any neuropathies. \par She denies any other changes in her family, medical, or social history since her last visit of 1/10/19.

## 2019-02-14 NOTE — RESULTS/DATA
[FreeTextEntry1] : WBC: 4.0  ANC: 2.3  Hgb: 11.6  Hct: 34.4   MCV: 95.6   Plts: 380\par \par Labs done 1/31/19: \par CMP: significant for a nonfasting glucose of 103, else WNL\par Ca125: 56 (was 269 on 11/29/18)

## 2019-02-21 ENCOUNTER — APPOINTMENT (OUTPATIENT)
Age: 64
End: 2019-02-21
Payer: COMMERCIAL

## 2019-02-21 ENCOUNTER — LABORATORY RESULT (OUTPATIENT)
Age: 64
End: 2019-02-21

## 2019-02-21 VITALS
HEIGHT: 60 IN | TEMPERATURE: 98.4 F | HEART RATE: 102 BPM | DIASTOLIC BLOOD PRESSURE: 76 MMHG | BODY MASS INDEX: 28.07 KG/M2 | WEIGHT: 143 LBS | SYSTOLIC BLOOD PRESSURE: 156 MMHG

## 2019-02-21 LAB
HCT VFR BLD CALC: 35.4 %
HGB BLD-MCNC: 12.3 G/DL
MCHC RBC-ENTMCNC: 33.2 PG
MCHC RBC-ENTMCNC: 34.7 GM/DL
MCV RBC AUTO: 95.7 FL
PLATELET # BLD AUTO: 360 K/UL
RBC # BLD: 3.7 M/UL
RBC # FLD: 14 %
WBC # FLD AUTO: 4.9 K/UL

## 2019-02-21 PROCEDURE — 96367 TX/PROPH/DG ADDL SEQ IV INF: CPT

## 2019-02-21 PROCEDURE — 96413 CHEMO IV INFUSION 1 HR: CPT

## 2019-02-21 PROCEDURE — 96417 CHEMO IV INFUS EACH ADDL SEQ: CPT

## 2019-02-21 PROCEDURE — 36415 COLL VENOUS BLD VENIPUNCTURE: CPT

## 2019-02-21 PROCEDURE — 85025 COMPLETE CBC W/AUTO DIFF WBC: CPT

## 2019-02-22 LAB
25(OH)D3 SERPL-MCNC: 35 NG/ML
ALBUMIN SERPL ELPH-MCNC: 4.7 G/DL
ALP BLD-CCNC: 89 U/L
ALT SERPL-CCNC: 17 U/L
ANION GAP SERPL CALC-SCNC: 16 MMOL/L
AST SERPL-CCNC: 21 U/L
BILIRUB SERPL-MCNC: 0.4 MG/DL
BUN SERPL-MCNC: 12 MG/DL
CALCIUM SERPL-MCNC: 9.6 MG/DL
CANCER AG125 SERPL-ACNC: 42 U/ML
CHLORIDE SERPL-SCNC: 104 MMOL/L
CO2 SERPL-SCNC: 20 MMOL/L
CREAT SERPL-MCNC: 0.48 MG/DL
GLUCOSE SERPL-MCNC: 108 MG/DL
POTASSIUM SERPL-SCNC: 4.5 MMOL/L
PROT SERPL-MCNC: 6.9 G/DL
SODIUM SERPL-SCNC: 140 MMOL/L

## 2019-02-28 ENCOUNTER — APPOINTMENT (OUTPATIENT)
Age: 64
End: 2019-02-28
Payer: COMMERCIAL

## 2019-02-28 ENCOUNTER — LABORATORY RESULT (OUTPATIENT)
Age: 64
End: 2019-02-28

## 2019-02-28 VITALS
BODY MASS INDEX: 28.27 KG/M2 | WEIGHT: 144 LBS | SYSTOLIC BLOOD PRESSURE: 143 MMHG | HEART RATE: 93 BPM | HEIGHT: 60 IN | TEMPERATURE: 98 F | DIASTOLIC BLOOD PRESSURE: 85 MMHG

## 2019-02-28 LAB
HCT VFR BLD CALC: 33.3 %
HGB BLD-MCNC: 11.5 G/DL
MCHC RBC-ENTMCNC: 32.9 PG
MCHC RBC-ENTMCNC: 34.4 GM/DL
MCV RBC AUTO: 95.8 FL
PLATELET # BLD AUTO: 302 K/UL
RBC # BLD: 3.48 M/UL
RBC # FLD: 13 %
WBC # FLD AUTO: 5.4 K/UL

## 2019-02-28 PROCEDURE — 85025 COMPLETE CBC W/AUTO DIFF WBC: CPT

## 2019-02-28 PROCEDURE — 96417 CHEMO IV INFUS EACH ADDL SEQ: CPT

## 2019-02-28 PROCEDURE — 99215 OFFICE O/P EST HI 40 MIN: CPT | Mod: 25

## 2019-02-28 PROCEDURE — 36415 COLL VENOUS BLD VENIPUNCTURE: CPT

## 2019-02-28 PROCEDURE — 96413 CHEMO IV INFUSION 1 HR: CPT

## 2019-02-28 PROCEDURE — 96367 TX/PROPH/DG ADDL SEQ IV INF: CPT

## 2019-02-28 PROCEDURE — 96415 CHEMO IV INFUSION ADDL HR: CPT

## 2019-03-02 VITALS
BODY MASS INDEX: 28.12 KG/M2 | HEART RATE: 93 BPM | WEIGHT: 144 LBS | TEMPERATURE: 98 F | DIASTOLIC BLOOD PRESSURE: 85 MMHG | SYSTOLIC BLOOD PRESSURE: 143 MMHG

## 2019-03-02 NOTE — ASSESSMENT
[FreeTextEntry1] : Ms. TIM 's questions were answered to her satisfaction. She expressed her understanding and willingness to comply with the above recommendations, and  will return to the office to review the results of the blood tests in 4 weeks, after restaging scans.\par

## 2019-03-02 NOTE — REVIEW OF SYSTEMS
[Dysmenorrhea/Abn Vaginal Bleeding] : dysmenorrhea/abnormal vaginal bleeding [Insomnia] : insomnia [Anxiety] : anxiety [Negative] : Heme/Lymph [Chest Pain] : no chest pain [Lower Ext Edema] : no lower extremity edema [Shortness Of Breath] : no shortness of breath [Cough] : no cough [Abdominal Pain] : no abdominal pain [Vomiting] : no vomiting [Skin Rash] : no skin rash [Easy Bleeding] : no tendency for easy bleeding [Easy Bruising] : no tendency for easy bruising [de-identified] : cannot sleep since diagnosis due to anxiety

## 2019-03-02 NOTE — REASON FOR VISIT
[Follow-Up Visit] : a follow-up [Family Member] : family member [FreeTextEntry2] : endometrioid uterine cancer

## 2019-03-02 NOTE — HISTORY OF PRESENT ILLNESS
[Disease: _____________________] : Disease: [unfilled] [AJCC Stage: ____] : AJCC Stage: [unfilled] [de-identified] : 62 years old postmenopausal  female , with stage III C poorly differentiated, endometrioid carcinoma of the uterus diagnosed in 2018.\par \par CASE SYNOPSIS:\par \par 2018:\par Cervical mass biopsy – poorly differentiated carcinoma, favoring endometrial origin.\par \par 2018:\par Pelvic US (performed at [facility]): uterus size 9.9 x 5.3x 4.4 cm, endometrial mucosa thickness 24 mm, cervix with lobular appearance and heterogenous echo texture. Adnexae unremarkable, but a heterogenous solid  mass, measuring 3.8 x 3.6 x 2.8 cm, is described adjacent to the right ovary.\par \par 2018:\par CT C/A/P: No evidence of distant metastases; significant distension endometrial cavity (16 mm in size), extending into enlarged bulky cervix, with questionable proximal vaginal extension. Significant, diffuse retroperitoneal and pelvic lymphadenopathy ( lymph nodes in right pelvis abutting the right ovary, as seen on pelvis ultrasound.\par \par 2018:\par Pathology review [facility]: poorly differentiated, endometrioid adenocarcinoma; IHC non- contributory; cannot distinguished cervical vs endometrial origin.\par \par 2018:\par Started  Taxol/Carbo ( initially Q 21, switched to weekly schedule due to significant skin rash development after 1st dose).\par \par 2019: \par Foundation One study consistent with MSI- H, TBM- high status, no other actionable target gene alterations.\par \par 2019: continues cycle #4 chemotherapy with Taxol/ Carboplatin.\par   dropped from 269 (18) to 42 (19).  [de-identified] : poorly differentiated, endometrioid [de-identified] :  [FreeTextEntry1] : neoadjuvant Taxol/ Carboplatin weekly- for cycle #4, D8 today [de-identified] : Patient returning for follow up; to receive neoadjuvant cycle # 4, D 8 Taxol/ Carboplatin today. Patient has continued to work throughout her treatment, and is preoccupied that she has not been been feeling weak at all, worried that treatment might not work. In interim, she has developed deug- induced alopecia, and is now complaining that she is loosing her eyelashes. Denies neuropathic pain, changes in appetite, B symptoms. Continues to be active at home. No recent hospitalizations. After the generalized rash which developed after the first dose of chemotherapy, patient had no cutaneous lesions. She is eating well, with no evidence of weight loss. Denies pain. Her chief complaint remains insomnia; taking Ambien for sleep. Medication list reviewed. Last CT ( November 2018) showed no evidence of  distant metastases. Accompanied by son, Carlito.

## 2019-03-07 ENCOUNTER — APPOINTMENT (OUTPATIENT)
Age: 64
End: 2019-03-07
Payer: COMMERCIAL

## 2019-03-07 ENCOUNTER — LABORATORY RESULT (OUTPATIENT)
Age: 64
End: 2019-03-07

## 2019-03-07 VITALS
WEIGHT: 144 LBS | SYSTOLIC BLOOD PRESSURE: 170 MMHG | DIASTOLIC BLOOD PRESSURE: 96 MMHG | HEIGHT: 60 IN | TEMPERATURE: 98.5 F | HEART RATE: 106 BPM | BODY MASS INDEX: 28.27 KG/M2

## 2019-03-07 LAB
HCT VFR BLD CALC: 31.9 %
HGB BLD-MCNC: 11.1 G/DL
MCHC RBC-ENTMCNC: 33.3 PG
MCHC RBC-ENTMCNC: 35 GM/DL
MCV RBC AUTO: 95.1 FL
PLATELET # BLD AUTO: 377 K/UL
RBC # BLD: 3.35 M/UL
RBC # FLD: 12.8 %
WBC # FLD AUTO: 2.6 K/UL

## 2019-03-07 PROCEDURE — 36415 COLL VENOUS BLD VENIPUNCTURE: CPT

## 2019-03-07 PROCEDURE — 85025 COMPLETE CBC W/AUTO DIFF WBC: CPT

## 2019-03-07 PROCEDURE — 96417 CHEMO IV INFUS EACH ADDL SEQ: CPT

## 2019-03-07 PROCEDURE — 96372 THER/PROPH/DIAG INJ SC/IM: CPT | Mod: 59

## 2019-03-07 PROCEDURE — 96413 CHEMO IV INFUSION 1 HR: CPT

## 2019-03-07 PROCEDURE — 96367 TX/PROPH/DG ADDL SEQ IV INF: CPT

## 2019-03-19 ENCOUNTER — FORM ENCOUNTER (OUTPATIENT)
Age: 64
End: 2019-03-19

## 2019-03-20 ENCOUNTER — APPOINTMENT (OUTPATIENT)
Dept: NUCLEAR MEDICINE | Facility: CLINIC | Age: 64
End: 2019-03-20

## 2019-03-20 ENCOUNTER — OUTPATIENT (OUTPATIENT)
Dept: OUTPATIENT SERVICES | Facility: HOSPITAL | Age: 64
LOS: 1 days | End: 2019-03-20
Payer: COMMERCIAL

## 2019-03-20 DIAGNOSIS — Z00.8 ENCOUNTER FOR OTHER GENERAL EXAMINATION: ICD-10-CM

## 2019-03-20 PROCEDURE — 78815 PET IMAGE W/CT SKULL-THIGH: CPT

## 2019-03-20 PROCEDURE — 78815 PET IMAGE W/CT SKULL-THIGH: CPT | Mod: 26,PS

## 2019-03-20 PROCEDURE — A9552: CPT

## 2019-03-21 ENCOUNTER — APPOINTMENT (OUTPATIENT)
Age: 64
End: 2019-03-21
Payer: COMMERCIAL

## 2019-03-21 ENCOUNTER — LABORATORY RESULT (OUTPATIENT)
Age: 64
End: 2019-03-21

## 2019-03-21 VITALS
BODY MASS INDEX: 27.88 KG/M2 | WEIGHT: 142 LBS | SYSTOLIC BLOOD PRESSURE: 161 MMHG | HEART RATE: 97 BPM | DIASTOLIC BLOOD PRESSURE: 86 MMHG | TEMPERATURE: 98.2 F | HEIGHT: 60 IN

## 2019-03-21 LAB
HCT VFR BLD CALC: 35.2 %
HGB BLD-MCNC: 12.6 G/DL
MCHC RBC-ENTMCNC: 34.1 PG
MCHC RBC-ENTMCNC: 35.7 GM/DL
MCV RBC AUTO: 95.6 FL
PLATELET # BLD AUTO: 349 K/UL
RBC # BLD: 3.68 M/UL
RBC # FLD: 13.4 %
WBC # FLD AUTO: 8.6 K/UL

## 2019-03-21 PROCEDURE — 96417 CHEMO IV INFUS EACH ADDL SEQ: CPT

## 2019-03-21 PROCEDURE — 85025 COMPLETE CBC W/AUTO DIFF WBC: CPT

## 2019-03-21 PROCEDURE — 36415 COLL VENOUS BLD VENIPUNCTURE: CPT

## 2019-03-21 PROCEDURE — 96367 TX/PROPH/DG ADDL SEQ IV INF: CPT

## 2019-03-21 PROCEDURE — 96413 CHEMO IV INFUSION 1 HR: CPT

## 2019-03-22 LAB
ALBUMIN SERPL ELPH-MCNC: 4.7 G/DL
ALP BLD-CCNC: 123 U/L
ALT SERPL-CCNC: 23 U/L
ANION GAP SERPL CALC-SCNC: 15 MMOL/L
AST SERPL-CCNC: 19 U/L
BILIRUB SERPL-MCNC: 0.4 MG/DL
BUN SERPL-MCNC: 13 MG/DL
CALCIUM SERPL-MCNC: 9.7 MG/DL
CANCER AG125 SERPL-ACNC: 63 U/ML
CHLORIDE SERPL-SCNC: 102 MMOL/L
CO2 SERPL-SCNC: 23 MMOL/L
CREAT SERPL-MCNC: 0.55 MG/DL
GLUCOSE SERPL-MCNC: 105 MG/DL
POTASSIUM SERPL-SCNC: 4.3 MMOL/L
PROT SERPL-MCNC: 7.2 G/DL
SODIUM SERPL-SCNC: 140 MMOL/L

## 2019-03-28 ENCOUNTER — LABORATORY RESULT (OUTPATIENT)
Age: 64
End: 2019-03-28

## 2019-03-28 ENCOUNTER — APPOINTMENT (OUTPATIENT)
Age: 64
End: 2019-03-28
Payer: COMMERCIAL

## 2019-03-28 ENCOUNTER — APPOINTMENT (OUTPATIENT)
Age: 64
End: 2019-03-28

## 2019-03-28 VITALS
DIASTOLIC BLOOD PRESSURE: 81 MMHG | BODY MASS INDEX: 28.47 KG/M2 | HEART RATE: 103 BPM | HEIGHT: 60 IN | TEMPERATURE: 98.2 F | SYSTOLIC BLOOD PRESSURE: 145 MMHG | WEIGHT: 145 LBS

## 2019-03-28 LAB
HCT VFR BLD CALC: 34.2 %
HGB BLD-MCNC: 11.6 G/DL
MCHC RBC-ENTMCNC: 33.5 PG
MCHC RBC-ENTMCNC: 34 GM/DL
MCV RBC AUTO: 98.5 FL
PLATELET # BLD AUTO: 453 K/UL
RBC # BLD: 3.48 M/UL
RBC # FLD: 13.4 %
WBC # FLD AUTO: 7.4 K/UL

## 2019-03-28 PROCEDURE — 85025 COMPLETE CBC W/AUTO DIFF WBC: CPT

## 2019-03-28 PROCEDURE — 96417 CHEMO IV INFUS EACH ADDL SEQ: CPT

## 2019-03-28 PROCEDURE — 96367 TX/PROPH/DG ADDL SEQ IV INF: CPT

## 2019-03-28 PROCEDURE — 99215 OFFICE O/P EST HI 40 MIN: CPT | Mod: 25

## 2019-03-28 PROCEDURE — 96413 CHEMO IV INFUSION 1 HR: CPT

## 2019-03-28 PROCEDURE — 36415 COLL VENOUS BLD VENIPUNCTURE: CPT

## 2019-03-30 VITALS
SYSTOLIC BLOOD PRESSURE: 145 MMHG | BODY MASS INDEX: 28.32 KG/M2 | HEART RATE: 103 BPM | DIASTOLIC BLOOD PRESSURE: 81 MMHG | TEMPERATURE: 98.2 F | WEIGHT: 145 LBS

## 2019-03-30 NOTE — HISTORY OF PRESENT ILLNESS
[Disease: _____________________] : Disease: [unfilled] [AJCC Stage: ____] : AJCC Stage: [unfilled] [de-identified] : 63 years old postmenopausal  female , with stage III C poorly differentiated, endometrioid carcinoma of the uterus diagnosed in 2018.\par \par CASE SYNOPSIS:\par \par 2018:\par Cervical mass biopsy – poorly differentiated carcinoma, favoring endometrial origin.\par \par 2018:\par Pelvic US (performed at [facility]): uterus size 9.9 x 5.3x 4.4 cm, endometrial mucosa thickness 24 mm, cervix with lobular appearance and heterogenous echo texture. Adnexae unremarkable, but a heterogenous solid  mass, measuring 3.8 x 3.6 x 2.8 cm, is described adjacent to the right ovary.\par \par 2018:\par CT C/A/P: No evidence of distant metastases; significant distension endometrial cavity (16 mm in size), extending into enlarged bulky cervix, with questionable proximal vaginal extension. Significant, diffuse retroperitoneal and pelvic lymphadenopathy ( lymph nodes in right pelvis abutting the right ovary, as seen on pelvis ultrasound.\par \par 2018:\par Pathology review [facility]: poorly differentiated, endometrioid adenocarcinoma; IHC non- contributory; cannot distinguished cervical vs endometrial origin.\par \par 2018:\par Started  Taxol/Carbo ( initially Q 21, switched to weekly schedule due to significant skin rash development after 1st dose).\par \par 2019: \par Foundation One study consistent with MSI- H, TBM- high status, no other actionable target gene alterations.\par \par 2019: continues cycle #4 chemotherapy with Taxol/ Carboplatin.\par   dropped from 269 (18) to 42 (19). \par \par 2019:\par PET/CT - decrease in FDG avidity in both the endometrial cavity, and in the retroperitoneal and abdominopelvic lymphadenopathy. Unchanged size  of bilateral retrocrural LAD.\par Multiple FDG avid left axillary and retropectoral lymph nodes (? new)- for which FNA recommended.\par Unchanged, minimally changed FDG avid nodular opacity RML. [de-identified] : poorly differentiated, endometrioid [de-identified] :  [FreeTextEntry1] : neoadjuvant Taxol/ Carboplatin weekly- for cycle #4, D8 today [de-identified] : Patient here for cycle # 5, D8 systemic chemotherapy. On March 20, 2019 patient had restaging PET/CT , which showed decrease in FDG avidity in both the endometrial cavity, and in the retroperitoneal and abdominopelvic lymphadenopathy. Unchanged size  of bilateral retrocrural LAD.Multiple FDG avid left axillary and retropectoral lymph nodes (? new)- for which FNA recommended.Unchanged, minimally changed FDG avid nodular opacity RML. Patient denies new symptoms. Continues to complain of insomnia; denies rash, GI symptoms, or fatigue. She continues to work. Accompanied by son, Carlito.

## 2019-03-30 NOTE — REVIEW OF SYSTEMS
[Dysmenorrhea/Abn Vaginal Bleeding] : dysmenorrhea/abnormal vaginal bleeding [Insomnia] : insomnia [Anxiety] : anxiety [Negative] : Heme/Lymph [Chest Pain] : no chest pain [Lower Ext Edema] : no lower extremity edema [Shortness Of Breath] : no shortness of breath [Cough] : no cough [Abdominal Pain] : no abdominal pain [Vomiting] : no vomiting [Skin Rash] : no skin rash [Easy Bleeding] : no tendency for easy bleeding [Easy Bruising] : no tendency for easy bruising [de-identified] : cannot sleep since diagnosis due to anxiety

## 2019-04-04 ENCOUNTER — LABORATORY RESULT (OUTPATIENT)
Age: 64
End: 2019-04-04

## 2019-04-04 ENCOUNTER — APPOINTMENT (OUTPATIENT)
Age: 64
End: 2019-04-04
Payer: COMMERCIAL

## 2019-04-04 VITALS
HEIGHT: 60 IN | WEIGHT: 143 LBS | SYSTOLIC BLOOD PRESSURE: 137 MMHG | TEMPERATURE: 98.3 F | HEART RATE: 108 BPM | BODY MASS INDEX: 28.07 KG/M2 | DIASTOLIC BLOOD PRESSURE: 80 MMHG

## 2019-04-04 LAB
HCT VFR BLD CALC: 32.3 %
HGB BLD-MCNC: 11.1 G/DL
MCHC RBC-ENTMCNC: 33.6 PG
MCHC RBC-ENTMCNC: 34.5 GM/DL
MCV RBC AUTO: 97.4 FL
PLATELET # BLD AUTO: 368 K/UL
RBC # BLD: 3.32 M/UL
RBC # FLD: 13.4 %
WBC # FLD AUTO: 3.4 K/UL

## 2019-04-04 PROCEDURE — 96372 THER/PROPH/DIAG INJ SC/IM: CPT | Mod: 59

## 2019-04-04 PROCEDURE — 96417 CHEMO IV INFUS EACH ADDL SEQ: CPT

## 2019-04-04 PROCEDURE — 96367 TX/PROPH/DG ADDL SEQ IV INF: CPT

## 2019-04-04 PROCEDURE — 96413 CHEMO IV INFUSION 1 HR: CPT

## 2019-04-08 ENCOUNTER — RX RENEWAL (OUTPATIENT)
Age: 64
End: 2019-04-08

## 2019-04-11 ENCOUNTER — FORM ENCOUNTER (OUTPATIENT)
Age: 64
End: 2019-04-11

## 2019-04-12 ENCOUNTER — OUTPATIENT (OUTPATIENT)
Dept: OUTPATIENT SERVICES | Facility: HOSPITAL | Age: 64
LOS: 1 days | End: 2019-04-12
Payer: COMMERCIAL

## 2019-04-12 ENCOUNTER — APPOINTMENT (OUTPATIENT)
Dept: MAMMOGRAPHY | Facility: CLINIC | Age: 64
End: 2019-04-12
Payer: COMMERCIAL

## 2019-04-12 DIAGNOSIS — Z00.00 ENCOUNTER FOR GENERAL ADULT MEDICAL EXAMINATION WITHOUT ABNORMAL FINDINGS: ICD-10-CM

## 2019-04-12 PROCEDURE — 77066 DX MAMMO INCL CAD BI: CPT

## 2019-04-12 PROCEDURE — 77062 BREAST TOMOSYNTHESIS BI: CPT | Mod: 26

## 2019-04-12 PROCEDURE — G0279: CPT

## 2019-04-12 PROCEDURE — G0279: CPT | Mod: 26

## 2019-04-12 PROCEDURE — 76641 ULTRASOUND BREAST COMPLETE: CPT | Mod: 26,50

## 2019-04-12 PROCEDURE — 76641 ULTRASOUND BREAST COMPLETE: CPT

## 2019-04-12 PROCEDURE — 77066 DX MAMMO INCL CAD BI: CPT | Mod: 26

## 2019-04-18 ENCOUNTER — APPOINTMENT (OUTPATIENT)
Age: 64
End: 2019-04-18
Payer: COMMERCIAL

## 2019-04-18 ENCOUNTER — LABORATORY RESULT (OUTPATIENT)
Age: 64
End: 2019-04-18

## 2019-04-18 VITALS
HEIGHT: 60 IN | SYSTOLIC BLOOD PRESSURE: 149 MMHG | TEMPERATURE: 98 F | DIASTOLIC BLOOD PRESSURE: 78 MMHG | HEART RATE: 96 BPM | WEIGHT: 142 LBS | BODY MASS INDEX: 27.88 KG/M2

## 2019-04-18 LAB
ALBUMIN SERPL ELPH-MCNC: 4.6 G/DL
ALP BLD-CCNC: 113 U/L
ALT SERPL-CCNC: 24 U/L
ANION GAP SERPL CALC-SCNC: 14 MMOL/L
AST SERPL-CCNC: 16 U/L
BILIRUB SERPL-MCNC: 0.6 MG/DL
BUN SERPL-MCNC: 11 MG/DL
CALCIUM SERPL-MCNC: 9.6 MG/DL
CANCER AG125 SERPL-ACNC: 81 U/ML
CHLORIDE SERPL-SCNC: 105 MMOL/L
CO2 SERPL-SCNC: 22 MMOL/L
CREAT SERPL-MCNC: 0.53 MG/DL
GLUCOSE SERPL-MCNC: 108 MG/DL
HCT VFR BLD CALC: 34.6 %
HGB BLD-MCNC: 11.7 G/DL
MCHC RBC-ENTMCNC: 33.1 PG
MCHC RBC-ENTMCNC: 33.8 GM/DL
MCV RBC AUTO: 98.1 FL
PLATELET # BLD AUTO: 343 K/UL
POTASSIUM SERPL-SCNC: 4.2 MMOL/L
PROT SERPL-MCNC: 6.7 G/DL
RBC # BLD: 3.52 M/UL
RBC # FLD: 13.7 %
SODIUM SERPL-SCNC: 141 MMOL/L
WBC # FLD AUTO: 8.2 K/UL

## 2019-04-18 PROCEDURE — 36415 COLL VENOUS BLD VENIPUNCTURE: CPT

## 2019-04-18 PROCEDURE — 96413 CHEMO IV INFUSION 1 HR: CPT

## 2019-04-18 PROCEDURE — 85025 COMPLETE CBC W/AUTO DIFF WBC: CPT

## 2019-04-18 PROCEDURE — 96367 TX/PROPH/DG ADDL SEQ IV INF: CPT

## 2019-04-18 PROCEDURE — 96417 CHEMO IV INFUS EACH ADDL SEQ: CPT

## 2019-04-19 LAB — CANCER AG27-29 SERPL-ACNC: 57.6 U/ML

## 2019-04-25 ENCOUNTER — APPOINTMENT (OUTPATIENT)
Age: 64
End: 2019-04-25

## 2019-04-28 ENCOUNTER — FORM ENCOUNTER (OUTPATIENT)
Age: 64
End: 2019-04-28

## 2019-04-29 ENCOUNTER — RESULT REVIEW (OUTPATIENT)
Age: 64
End: 2019-04-29

## 2019-04-29 ENCOUNTER — APPOINTMENT (OUTPATIENT)
Dept: ULTRASOUND IMAGING | Facility: CLINIC | Age: 64
End: 2019-04-29
Payer: COMMERCIAL

## 2019-04-29 ENCOUNTER — OUTPATIENT (OUTPATIENT)
Dept: OUTPATIENT SERVICES | Facility: HOSPITAL | Age: 64
LOS: 1 days | End: 2019-04-29
Payer: COMMERCIAL

## 2019-04-29 DIAGNOSIS — Z00.00 ENCOUNTER FOR GENERAL ADULT MEDICAL EXAMINATION WITHOUT ABNORMAL FINDINGS: ICD-10-CM

## 2019-04-29 DIAGNOSIS — Z00.8 ENCOUNTER FOR OTHER GENERAL EXAMINATION: ICD-10-CM

## 2019-04-29 DIAGNOSIS — R92.8 OTHER ABNORMAL AND INCONCLUSIVE FINDINGS ON DIAGNOSTIC IMAGING OF BREAST: ICD-10-CM

## 2019-04-29 PROCEDURE — 19084 BX BREAST ADD LESION US IMAG: CPT

## 2019-04-29 PROCEDURE — 19084 BX BREAST ADD LESION US IMAG: CPT | Mod: 59,LT

## 2019-04-29 PROCEDURE — 77065 DX MAMMO INCL CAD UNI: CPT | Mod: 26,LT

## 2019-04-29 PROCEDURE — 19083 BX BREAST 1ST LESION US IMAG: CPT | Mod: LT

## 2019-04-29 PROCEDURE — 19084 BX BREAST ADD LESION US IMAG: CPT | Mod: LT

## 2019-04-29 PROCEDURE — 77065 DX MAMMO INCL CAD UNI: CPT

## 2019-04-29 PROCEDURE — A4648: CPT

## 2019-04-29 PROCEDURE — 19083 BX BREAST 1ST LESION US IMAG: CPT

## 2019-05-02 ENCOUNTER — APPOINTMENT (OUTPATIENT)
Age: 64
End: 2019-05-02
Payer: COMMERCIAL

## 2019-05-02 VITALS
SYSTOLIC BLOOD PRESSURE: 144 MMHG | TEMPERATURE: 98.4 F | WEIGHT: 140 LBS | HEART RATE: 109 BPM | DIASTOLIC BLOOD PRESSURE: 78 MMHG | HEIGHT: 60 IN | BODY MASS INDEX: 27.48 KG/M2

## 2019-05-02 PROCEDURE — 99215 OFFICE O/P EST HI 40 MIN: CPT

## 2019-05-02 NOTE — PHYSICAL EXAM
[Restricted in physically strenuous activity but ambulatory and able to carry out work of a light or sedentary nature] : Status 1- Restricted in physically strenuous activity but ambulatory and able to carry out work of a light or sedentary nature, e.g., light house work, office work [Normal] : full range of motion and no deformities appreciated

## 2019-05-02 NOTE — HISTORY OF PRESENT ILLNESS
[Disease: _____________________] : Disease: [unfilled] [AJCC Stage: ____] : AJCC Stage: [unfilled] [de-identified] : 63 years old postmenopausal  female , with stage III C poorly differentiated, endometrioid carcinoma of the uterus diagnosed in 2018.\par \par CASE SYNOPSIS:\par \par 2018:\par Cervical mass biopsy – poorly differentiated carcinoma, favoring endometrial origin.\par \par 2018:\par Pelvic US (performed at [facility]): uterus size 9.9 x 5.3x 4.4 cm, endometrial mucosa thickness 24 mm, cervix with lobular appearance and heterogenous echo texture. Adnexae unremarkable, but a heterogenous solid  mass, measuring 3.8 x 3.6 x 2.8 cm, is described adjacent to the right ovary.\par \par 2018:\par CT C/A/P: No evidence of distant metastases; significant distension endometrial cavity (16 mm in size), extending into enlarged bulky cervix, with questionable proximal vaginal extension. Significant, diffuse retroperitoneal and pelvic lymphadenopathy ( lymph nodes in right pelvis abutting the right ovary, as seen on pelvis ultrasound.\par \par 2018:\par Pathology review [facility]: poorly differentiated, endometrioid adenocarcinoma; IHC non- contributory; cannot distinguished cervical vs endometrial origin.\par \par 2018:\par Started  Taxol/Carbo ( initially Q 21, switched to weekly schedule due to significant skin rash development after 1st dose).\par \par 2019: \par Foundation One study consistent with MSI- H, TBM- high status, no other actionable target gene alterations.\par \par 2019: continues cycle #4 chemotherapy with Taxol/ Carboplatin.\par   dropped from 269 (18) to 42 (19). \par \par 2019:\par PET/CT - decrease in FDG avidity in both the endometrial cavity, and in the retroperitoneal and abdominopelvic lymphadenopathy. Unchanged size  of bilateral retrocrural LAD.\par Multiple FDG avid left axillary and retropectoral lymph nodes (? new)- for which FNA recommended.\par Unchanged, minimally changed FDG avid nodular opacity RML.\par \par 19:\par Mammography: Area of architectural distortion central left breast, correlating with US -described lobulated hypoechoic mass left breast 12:00, 5 cm from the nipple.\par Breast ultrasound:\par Left breast 12:00 5 cm from the nipple lobulated hypoechoic mass with associated architectural distortion. Multiple abnormal left axillary lymph nodes with focal cortical thickening. Ultrasound-guided core biopsy recommended for both breast mass and axillary lymph node. \par A 12:00 retroareolar 0.4 cm intraductal mass, for which ultrasound guided core biopsy recommended.\par \par 2019:\par Patient developed rash on upper extremities after D1, cycle #6 Carboplatin; chemotherapy discontinued.\par \par 2019:\par US –guided left breast biopsy – pathology breast consistent with intraductal papilloma left breast with ductal hyperplasia, adenosis, apocrine metaplasia. \par Left axillary LN biopsy – metastatic adenocarcinoma with papillary features, compatible with metastatic disease from known endometrial primary.\par \par \par  [de-identified] :  [FreeTextEntry1] : neoadjuvant Taxol/ Carboplatin  [de-identified] : poorly differentiated, endometrioid [de-identified] : Events described since last visit noted. Additionally doing her last chemotherapy patient presented with a recurrent  rash on both upper extremities . Appears stable, but anxious. \par Endometrial –reviewed again results of PET scan ( 3/20/19) consistent with decrease in size of both endometrial mass and abdominopelvic/ retroperitoneal LAD. Patient has started last chemotherapy cycle (#6, D1) on April 18, 2019, but developed recurrent rash upper extremity, likely allergic reaction to Carboplatin. \par Left breast cancer – reviewed in great detail results of recent mammogram/ breast US consistent with  2 masses in the left breast: one at 12:00, 5 cm from the nipple, and another one immediately retroareolar. US- guided biopsy done 4/29; pathology consistent with intraductal papilloma left breast with ductal hyperplasia, adenosis, apocrine metaplasia. Left axillary LN biopsy – metastatic adenocarcinoma with papillary features, compatible with metastatic disease from known endometrial primary.\par Patient feeling much better since chemotherapy discontinued. Continues to work. Accompanied by her son, Víctor.\par

## 2019-05-02 NOTE — REVIEW OF SYSTEMS
[Insomnia] : insomnia [Dysmenorrhea/Abn Vaginal Bleeding] : dysmenorrhea/abnormal vaginal bleeding [Anxiety] : anxiety [Negative] : Heme/Lymph [Lower Ext Edema] : no lower extremity edema [Chest Pain] : no chest pain [Cough] : no cough [Shortness Of Breath] : no shortness of breath [Abdominal Pain] : no abdominal pain [Vomiting] : no vomiting [Skin Rash] : no skin rash [Easy Bruising] : no tendency for easy bruising [Easy Bleeding] : no tendency for easy bleeding [de-identified] : cannot sleep since diagnosis due to anxiety

## 2019-05-05 DIAGNOSIS — Z87.42 PERSONAL HISTORY OF OTHER DISEASES OF THE FEMALE GENITAL TRACT: ICD-10-CM

## 2019-05-06 ENCOUNTER — APPOINTMENT (OUTPATIENT)
Age: 64
End: 2019-05-06

## 2019-05-13 PROBLEM — Z87.42 HISTORY OF POSTMENOPAUSAL BLEEDING: Status: RESOLVED | Noted: 2018-11-20 | Resolved: 2019-05-13

## 2019-05-16 ENCOUNTER — LABORATORY RESULT (OUTPATIENT)
Age: 64
End: 2019-05-16

## 2019-05-16 ENCOUNTER — APPOINTMENT (OUTPATIENT)
Age: 64
End: 2019-05-16
Payer: COMMERCIAL

## 2019-05-16 VITALS
SYSTOLIC BLOOD PRESSURE: 146 MMHG | WEIGHT: 138 LBS | BODY MASS INDEX: 27.09 KG/M2 | TEMPERATURE: 98.9 F | HEIGHT: 60 IN | HEART RATE: 116 BPM | DIASTOLIC BLOOD PRESSURE: 83 MMHG

## 2019-05-16 LAB
HCT VFR BLD CALC: 34.3 %
HGB BLD-MCNC: 11.4 G/DL
MCHC RBC-ENTMCNC: 32.6 PG
MCHC RBC-ENTMCNC: 33.3 GM/DL
MCV RBC AUTO: 98.1 FL
PLATELET # BLD AUTO: 459 K/UL
RBC # BLD: 3.49 M/UL
RBC # FLD: 12 %
WBC # FLD AUTO: 7.5 K/UL

## 2019-05-16 PROCEDURE — 85025 COMPLETE CBC W/AUTO DIFF WBC: CPT

## 2019-05-16 PROCEDURE — 36415 COLL VENOUS BLD VENIPUNCTURE: CPT

## 2019-05-16 PROCEDURE — 96413 CHEMO IV INFUSION 1 HR: CPT

## 2019-05-16 PROCEDURE — 96372 THER/PROPH/DIAG INJ SC/IM: CPT | Mod: 59

## 2019-05-16 PROCEDURE — 99214 OFFICE O/P EST MOD 30 MIN: CPT | Mod: 25

## 2019-05-16 RX ORDER — PEGFILGRASTIM 6 MG/0.6ML
6 KIT SUBCUTANEOUS
Qty: 1 | Refills: 0 | Status: DISCONTINUED | COMMUNITY
Start: 2019-04-08 | End: 2019-05-16

## 2019-05-16 RX ORDER — CIPROFLOXACIN HYDROCHLORIDE 500 MG/1
500 TABLET, FILM COATED ORAL
Qty: 14 | Refills: 0 | Status: DISCONTINUED | COMMUNITY
Start: 2019-04-01

## 2019-05-17 LAB
ALBUMIN SERPL ELPH-MCNC: 4.3 G/DL
ALP BLD-CCNC: 99 U/L
ALT SERPL-CCNC: 13 U/L
ANION GAP SERPL CALC-SCNC: 14 MMOL/L
AST SERPL-CCNC: 23 U/L
BILIRUB SERPL-MCNC: 0.4 MG/DL
BUN SERPL-MCNC: 12 MG/DL
CALCIUM SERPL-MCNC: 9.5 MG/DL
CANCER AG125 SERPL-ACNC: 163 U/ML
CHLORIDE SERPL-SCNC: 104 MMOL/L
CO2 SERPL-SCNC: 22 MMOL/L
CREAT SERPL-MCNC: 0.64 MG/DL
GLUCOSE SERPL-MCNC: 108 MG/DL
POTASSIUM SERPL-SCNC: 4.7 MMOL/L
PROT SERPL-MCNC: 7 G/DL
SODIUM SERPL-SCNC: 140 MMOL/L
TSH SERPL-ACNC: 0.66 UIU/ML

## 2019-06-06 ENCOUNTER — LABORATORY RESULT (OUTPATIENT)
Age: 64
End: 2019-06-06

## 2019-06-06 ENCOUNTER — APPOINTMENT (OUTPATIENT)
Age: 64
End: 2019-06-06
Payer: COMMERCIAL

## 2019-06-06 VITALS
HEIGHT: 60 IN | SYSTOLIC BLOOD PRESSURE: 127 MMHG | WEIGHT: 138 LBS | DIASTOLIC BLOOD PRESSURE: 83 MMHG | TEMPERATURE: 98.2 F | HEART RATE: 114 BPM | BODY MASS INDEX: 27.09 KG/M2 | RESPIRATION RATE: 16 BRPM

## 2019-06-06 LAB
HCT VFR BLD CALC: 35.6 %
HGB BLD-MCNC: 12.6 G/DL
MCHC RBC-ENTMCNC: 32.6 PG
MCHC RBC-ENTMCNC: 35.3 GM/DL
MCV RBC AUTO: 92.3 FL
PLATELET # BLD AUTO: 421 K/UL
RBC # BLD: 3.86 M/UL
RBC # FLD: 11.4 %
WBC # FLD AUTO: 6.6 K/UL

## 2019-06-06 PROCEDURE — 85025 COMPLETE CBC W/AUTO DIFF WBC: CPT

## 2019-06-06 PROCEDURE — 96413 CHEMO IV INFUSION 1 HR: CPT

## 2019-06-06 PROCEDURE — 36415 COLL VENOUS BLD VENIPUNCTURE: CPT

## 2019-06-27 ENCOUNTER — LABORATORY RESULT (OUTPATIENT)
Age: 64
End: 2019-06-27

## 2019-06-27 ENCOUNTER — APPOINTMENT (OUTPATIENT)
Age: 64
End: 2019-06-27
Payer: COMMERCIAL

## 2019-06-27 VITALS
BODY MASS INDEX: 27.68 KG/M2 | RESPIRATION RATE: 16 BRPM | SYSTOLIC BLOOD PRESSURE: 151 MMHG | HEART RATE: 96 BPM | HEIGHT: 60 IN | WEIGHT: 141 LBS | TEMPERATURE: 97.9 F | DIASTOLIC BLOOD PRESSURE: 80 MMHG

## 2019-06-27 DIAGNOSIS — F41.9 ANXIETY DISORDER, UNSPECIFIED: ICD-10-CM

## 2019-06-27 DIAGNOSIS — F51.04 PSYCHOPHYSIOLOGIC INSOMNIA: ICD-10-CM

## 2019-06-27 LAB
HCT VFR BLD CALC: 37 %
HGB BLD-MCNC: 12.3 G/DL
MCHC RBC-ENTMCNC: 31.6 PG
MCHC RBC-ENTMCNC: 33.3 GM/DL
MCV RBC AUTO: 95.1 FL
PLATELET # BLD AUTO: 355 K/UL
RBC # BLD: 3.89 M/UL
RBC # FLD: 11.3 %
WBC # FLD AUTO: 6.6 K/UL

## 2019-06-27 PROCEDURE — 96413 CHEMO IV INFUSION 1 HR: CPT

## 2019-06-27 PROCEDURE — 96372 THER/PROPH/DIAG INJ SC/IM: CPT | Mod: 59

## 2019-06-27 PROCEDURE — 85025 COMPLETE CBC W/AUTO DIFF WBC: CPT

## 2019-06-27 PROCEDURE — 99214 OFFICE O/P EST MOD 30 MIN: CPT | Mod: 25

## 2019-06-27 PROCEDURE — 36415 COLL VENOUS BLD VENIPUNCTURE: CPT

## 2019-06-27 NOTE — PHYSICAL EXAM
[Normal] : normal spine exam without palpable tenderness, no kyphosis or scoliosis [de-identified] : anicteric [de-identified] : no thrush or mucositis [de-identified] : no lymphadenopathy [de-identified] : S1S2 RRR,no obvious murmurs [de-identified] : no c/c/e [de-identified] : no rashes  [de-identified] : small b/l axillary lymphadenopathy, right slightly > than left.

## 2019-06-27 NOTE — ASSESSMENT
[FreeTextEntry1] : The patient is a 63 y.o. with a MSI-H, stage IIIC endometrial carcinoma s/p Taxol/Carbo. Started at Q21 but due to rash was changed to weekly.  Then on D1, C6 she had a Carbo reaction -> regime was stopped. \par Ca125 baseline was 269.  Nadired at 42 on 2/21/19 (D1, C4).  Was 81 when chemo stopped. \par PET/CT 3/20/19 with good NE but small left axillary nodes seen.  Given hx of breast cancer w/u done - bx c/w metastatic endo ca.  MSI-H - felt to be a good candidate for immunotherapy. \par 5/16/19 - Started Keytruda.  \par Tolerating well hitherto.  \par 1. Onc -  Ca125 on D1 was up to 163.  Repeat drawn last month lost by lab.  We discussed getting a restaging scan after ~ 3 months on immunotherapy - patient REALLY does not want to get a scan.  She also has a vacation planned for 8/11 - 8/12/19 and does not even want to think about scans until this is over.  It does seem that her Ca125 has correlated with disease response so we discussed that should her Ca125 decrease we can probably defer a scan for now, however if it increases x 2 then she will need a scan.  She is comfortable with this plan. \par 2. B12 deficiency - Hx of low B12 level.  IF and Antiparietal cell antibodies negative.  Will continue with B12 SQ every other treatment. \par 3. Neuropathy in hands - Doubt related to Keytruda.  Patient doing more, more active.  May be related to neck/back strain.  Encouraged to give this another week or two.  If not better then we may need to do scans to assess. \par 4. Insomnia -  To ask PMD to renew her sleeping pill. \par 5. Anxiety - Xanax renewed. \par  \par \par Dr. Job Montes De Oca/ Dr. Olivia Alvarez\par Dr. Karime Neal ([facility])

## 2019-06-27 NOTE — RESULTS/DATA
[FreeTextEntry1] : WBC: 6.6   ANC: 4.4  Hgb: 12.3  Hct: 37.0   MCV: 95.1   Plts: 355\par CMP, TSH, Ca125: pending\par (last month's was lost due to lab error).

## 2019-06-27 NOTE — HISTORY OF PRESENT ILLNESS
[T: ___] : T[unfilled] [Disease: _____________________] : Disease: [unfilled] [AJCC Stage: ____] : AJCC Stage: [unfilled] [N: ___] : N[unfilled] [de-identified] : STACEY TIM is a 63 y.o. who we are following for a stage IV endometrial cancer (axillary, retropectoral LN's)\par 11/7/18 -The patient presented with a 4 - 5 month hx of PMB and saw GYN Dr. Montes De Oca (prior GYN visit >9 years ago). Exam showed large cervical mass, biopsy revealed a poorly differentiated carcinoma favoring endometrial origin.   [facility] review of slides favored endometrioid adenocarcinoma, but could not distinguish between endometrial or cervical carcinoma. \par 11/16/18 - CT C/A/P - No distant mets; significant distention endometrial cavity (16mm) extending into an enlarged bulky cervix, with questionable proximal vaginal extension.  Significant, diffuse retroperitoneal and pelvic LAD (LN's in right pelvis abutting the right ovary).\par 12/6/18 - 4/18/19 - Started chemotherapy with Taxol/Carbo.  Initially given Q21, however on D7 C1 she developed an itchy, generalized, nonblanching, macular rash that was very confluent in spots, especially extremities, causing sunburnt like areas.  Resolved after prednisone.  Felt to be a Taxol rash so her therapy was changed to weekly Taxol with heavy premeds each week.  Patient then reported her daughter had a similar rash hand in retrospect this was felt to be viral.   Patient then had a Carbo reaction D1 C6. Chemo held.  \par Responded well - Ca125 on 11/29/18 was 269 - jeremiah was 42 on 2/22/19.\par 3/30/19 - PET/CT - decrease in FDG avidity in both the endometrial cavity and in the retroperitoneal and abdominopelvic lymphadenopathy. Unchanged size of bilateral retrocrural LAD. Unchanged, minimally changed FDG avid nodular opacity RML.  Multiple FDG avid left axillary and retropectoral lymph nodes (unchanged in size when c/w 11/16/18).\par Axillary lymphadenopathy felt to be unusual and so this was evaluated.  \par 4/12/19: Mammo/Sono:  ~ 1.1cm left breast mass at 12:00 and  0.4 cm left breast retroareolar intraductal mass.  Multiple abnormal left axillary LN's with focal cortical thickening.  \par 4/29/19: Left breast biopsies – benign.  Left axillary LN biopsy - metastatic adenocarcinoma with papillary features, compatible with metastatic disease from known endometrial primary.\par 5/16/19 - Started on Keytruda.  [de-identified] : poorly differentiated carcinoma but could not distinguish between endometrial or cervical carcinoma.  [de-identified] : 1/9/19 - Foundation One - MSI-H, TMB 34 (high), Alterations in ARID1A, CHUY, CTCF, CTNNB1, FANCA, FANCC, JAK1, MLL2, NFE2L2, PALB2, PIK3Ca, PTCH1, PTEN, STAG2, TP53. [de-identified] : The patient reports that she is feeling great on Keytruda.  Starting to get energy back.  Stated needed to rest a lot on chemo, now doesn't have to.\par About 1 week ago began having having some tingling in her fingers in hands R>L.  Initially middle finger, now all 5 fingers.  Worse at night, then better during the day. \par Last night was the 1st night it was better but she took a sleeping pill.  She asked me to renew but not on her profile - got from PMD.  She is not sure of name. \par She states this is her major problem - not sleeping well. (Not new). \par She denies any other changes in her family, medical, or social history since her last visit of 5/16/19.

## 2019-06-27 NOTE — REVIEW OF SYSTEMS
[Patient Intake Form Reviewed] : Patient intake form was reviewed [Anxiety] : anxiety [Negative] : Allergic/Immunologic [Muscle Weakness] : muscle weakness [Joint Stiffness] : joint stiffness [Joint Pain] : joint pain [de-identified] : sleep problems

## 2019-06-27 NOTE — REASON FOR VISIT
[Follow-Up Visit] : a follow-up [Other: _____] : [unfilled] [FreeTextEntry2] : Endometrial cancer - C3 D1 Keytruda

## 2019-06-28 LAB
ALBUMIN SERPL ELPH-MCNC: 4.3 G/DL
ALP BLD-CCNC: 92 U/L
ALT SERPL-CCNC: 22 U/L
ANION GAP SERPL CALC-SCNC: 14 MMOL/L
AST SERPL-CCNC: 21 U/L
BILIRUB SERPL-MCNC: 0.4 MG/DL
BUN SERPL-MCNC: 13 MG/DL
CALCIUM SERPL-MCNC: 9.5 MG/DL
CANCER AG125 SERPL-ACNC: 28 U/ML
CHLORIDE SERPL-SCNC: 102 MMOL/L
CO2 SERPL-SCNC: 21 MMOL/L
CREAT SERPL-MCNC: 0.55 MG/DL
GLUCOSE SERPL-MCNC: 101 MG/DL
POTASSIUM SERPL-SCNC: 4.7 MMOL/L
PROT SERPL-MCNC: 6.9 G/DL
SODIUM SERPL-SCNC: 137 MMOL/L
TSH SERPL-ACNC: 0.93 UIU/ML
VIT B12 SERPL-MCNC: 836 PG/ML

## 2019-07-15 ENCOUNTER — OTHER (OUTPATIENT)
Age: 64
End: 2019-07-15

## 2019-07-18 ENCOUNTER — APPOINTMENT (OUTPATIENT)
Age: 64
End: 2019-07-18
Payer: COMMERCIAL

## 2019-07-18 ENCOUNTER — LABORATORY RESULT (OUTPATIENT)
Age: 64
End: 2019-07-18

## 2019-07-18 VITALS
RESPIRATION RATE: 16 BRPM | BODY MASS INDEX: 27.68 KG/M2 | HEART RATE: 93 BPM | DIASTOLIC BLOOD PRESSURE: 84 MMHG | TEMPERATURE: 98.8 F | WEIGHT: 141 LBS | SYSTOLIC BLOOD PRESSURE: 154 MMHG | HEIGHT: 60 IN

## 2019-07-18 LAB
ALBUMIN SERPL ELPH-MCNC: 4.4 G/DL
ALP BLD-CCNC: 86 U/L
ALT SERPL-CCNC: 19 U/L
ANION GAP SERPL CALC-SCNC: 14 MMOL/L
AST SERPL-CCNC: 16 U/L
BILIRUB SERPL-MCNC: 0.4 MG/DL
BUN SERPL-MCNC: 11 MG/DL
CALCIUM SERPL-MCNC: 9.6 MG/DL
CHLORIDE SERPL-SCNC: 105 MMOL/L
CO2 SERPL-SCNC: 22 MMOL/L
CREAT SERPL-MCNC: 0.55 MG/DL
GLUCOSE SERPL-MCNC: 120 MG/DL
HCT VFR BLD CALC: 38.7 %
HGB BLD-MCNC: 13.3 G/DL
MCHC RBC-ENTMCNC: 32.1 PG
MCHC RBC-ENTMCNC: 34.4 GM/DL
MCV RBC AUTO: 93.4 FL
PLATELET # BLD AUTO: 359 K/UL
POTASSIUM SERPL-SCNC: 4.6 MMOL/L
PROT SERPL-MCNC: 6.8 G/DL
RBC # BLD: 4.14 M/UL
RBC # FLD: 11.3 %
SODIUM SERPL-SCNC: 140 MMOL/L
TSH SERPL-ACNC: 0.94 UIU/ML
WBC # FLD AUTO: 7.2 K/UL

## 2019-07-18 PROCEDURE — 36415 COLL VENOUS BLD VENIPUNCTURE: CPT

## 2019-07-18 PROCEDURE — 96413 CHEMO IV INFUSION 1 HR: CPT

## 2019-07-18 PROCEDURE — 85025 COMPLETE CBC W/AUTO DIFF WBC: CPT

## 2019-07-22 ENCOUNTER — OTHER (OUTPATIENT)
Age: 64
End: 2019-07-22

## 2019-08-08 ENCOUNTER — OUTPATIENT (OUTPATIENT)
Dept: OUTPATIENT SERVICES | Facility: HOSPITAL | Age: 64
LOS: 1 days | End: 2019-08-08
Payer: COMMERCIAL

## 2019-08-08 ENCOUNTER — APPOINTMENT (OUTPATIENT)
Age: 64
End: 2019-08-08
Payer: COMMERCIAL

## 2019-08-08 ENCOUNTER — APPOINTMENT (OUTPATIENT)
Age: 64
End: 2019-08-08

## 2019-08-08 VITALS
HEART RATE: 96 BPM | HEIGHT: 60 IN | SYSTOLIC BLOOD PRESSURE: 163 MMHG | TEMPERATURE: 99 F | BODY MASS INDEX: 27.88 KG/M2 | WEIGHT: 142 LBS | DIASTOLIC BLOOD PRESSURE: 89 MMHG

## 2019-08-08 VITALS — SYSTOLIC BLOOD PRESSURE: 138 MMHG | RESPIRATION RATE: 18 BRPM | HEART RATE: 94 BPM | DIASTOLIC BLOOD PRESSURE: 84 MMHG

## 2019-08-08 VITALS — SYSTOLIC BLOOD PRESSURE: 138 MMHG | HEART RATE: 94 BPM | RESPIRATION RATE: 18 BRPM | DIASTOLIC BLOOD PRESSURE: 84 MMHG

## 2019-08-08 DIAGNOSIS — R20.2 PARESTHESIA OF SKIN: ICD-10-CM

## 2019-08-08 DIAGNOSIS — R69 ILLNESS, UNSPECIFIED: ICD-10-CM

## 2019-08-08 PROCEDURE — 99214 OFFICE O/P EST MOD 30 MIN: CPT

## 2019-08-08 PROCEDURE — 96413 CHEMO IV INFUSION 1 HR: CPT

## 2019-08-08 RX ORDER — ALBUTEROL 90 UG/1
0 AEROSOL, METERED ORAL
Qty: 0 | Refills: 0 | DISCHARGE

## 2019-08-08 RX ORDER — SODIUM CHLORIDE 9 MG/ML
250 INJECTION INTRAMUSCULAR; INTRAVENOUS; SUBCUTANEOUS
Refills: 0 | Status: DISCONTINUED | OUTPATIENT
Start: 2019-08-08 | End: 2019-11-17

## 2019-08-08 RX ORDER — PEMBROLIZUMAB 25 MG/ML
200 INJECTION, SOLUTION INTRAVENOUS ONCE
Refills: 0 | Status: COMPLETED | OUTPATIENT
Start: 2019-08-08 | End: 2019-08-08

## 2019-08-08 RX ADMIN — SODIUM CHLORIDE 250 MILLILITER(S): 9 INJECTION INTRAMUSCULAR; INTRAVENOUS; SUBCUTANEOUS at 11:07

## 2019-08-08 RX ADMIN — PEMBROLIZUMAB 216 MILLIGRAM(S): 25 INJECTION, SOLUTION INTRAVENOUS at 10:29

## 2019-08-08 RX ADMIN — PEMBROLIZUMAB 200 MILLIGRAM(S): 25 INJECTION, SOLUTION INTRAVENOUS at 11:00

## 2019-08-08 RX ADMIN — SODIUM CHLORIDE 30 MILLILITER(S): 9 INJECTION INTRAMUSCULAR; INTRAVENOUS; SUBCUTANEOUS at 10:19

## 2019-08-08 NOTE — REVIEW OF SYSTEMS
[Dysmenorrhea/Abn Vaginal Bleeding] : dysmenorrhea/abnormal vaginal bleeding [Insomnia] : insomnia [Anxiety] : anxiety [Negative] : Heme/Lymph [Chest Pain] : no chest pain [Lower Ext Edema] : no lower extremity edema [Cough] : no cough [Shortness Of Breath] : no shortness of breath [Vomiting] : no vomiting [Abdominal Pain] : no abdominal pain [Skin Rash] : no skin rash [Easy Bleeding] : no tendency for easy bleeding [Easy Bruising] : no tendency for easy bruising [de-identified] : bilateral hands stiffness and sensory paresthesias.

## 2019-08-08 NOTE — REASON FOR VISIT
[Family Member] : family member [Follow-Up Visit] : a follow-up [FreeTextEntry2] : endometrioid uterine cancer

## 2019-08-08 NOTE — OB HISTORY
[Currently In Menopause] : currently in menopause [Definite:  ___ (Date)] : the last menstrual period was [unfilled] [Menopause Age: ____] : age at menopause was [unfilled] [___] : Living: [unfilled]

## 2019-08-08 NOTE — HISTORY OF PRESENT ILLNESS
[Disease: _____________________] : Disease: [unfilled] [AJCC Stage: ____] : AJCC Stage: [unfilled] [de-identified] : 63 years old postmenopausal  female , with stage III C poorly differentiated, endometrioid carcinoma of the uterus diagnosed in 2018.\par \par CASE SYNOPSIS:\par \par 2018:\par Cervical mass biopsy – poorly differentiated carcinoma, favoring endometrial origin.\par \par 2018:\par Pelvic US (performed at [facility]): uterus size 9.9 x 5.3x 4.4 cm, endometrial mucosa thickness 24 mm, cervix with lobular appearance and heterogenous echo texture. Adnexae unremarkable, but a heterogenous solid  mass, measuring 3.8 x 3.6 x 2.8 cm, is described adjacent to the right ovary.\par \par 2018:\par CT C/A/P: No evidence of distant metastases; significant distension endometrial cavity (16 mm in size), extending into enlarged bulky cervix, with questionable proximal vaginal extension. Significant, diffuse retroperitoneal and pelvic lymphadenopathy ( lymph nodes in right pelvis abutting the right ovary, as seen on pelvis ultrasound.\par \par 2018:\par Pathology review [facility]: poorly differentiated, endometrioid adenocarcinoma; IHC non- contributory; cannot distinguished cervical vs endometrial origin.\par \par 2018:\par Started  Taxol/Carbo ( initially Q 21, switched to weekly schedule due to significant skin rash development after 1st dose).\par \par 2019: \par Foundation One study consistent with MSI- H, TBM- high status, no other actionable target gene alterations.\par \par 2019: continues cycle #4 chemotherapy with Taxol/ Carboplatin.\par   dropped from 269 (18) to 42 (19). \par \par 2019:\par PET/CT - decrease in FDG avidity in both the endometrial cavity, and in the retroperitoneal and abdominopelvic lymphadenopathy. Unchanged size  of bilateral retrocrural LAD.\par Multiple FDG avid left axillary and retropectoral lymph nodes (? new)- for which FNA recommended.\par Unchanged, minimally changed FDG avid nodular opacity RML.\par \par 19:\par Mammography: Area of architectural distortion central left breast, correlating with US -described lobulated hypoechoic mass left breast 12:00, 5 cm from the nipple.\par Breast ultrasound:\par Left breast 12:00 5 cm from the nipple lobulated hypoechoic mass with associated architectural distortion. Multiple abnormal left axillary lymph nodes with focal cortical thickening. Ultrasound-guided core biopsy recommended for both breast mass and axillary lymph node. \par A 12:00 retroareolar 0.4 cm intraductal mass, for which ultrasound guided core biopsy recommended.\par \par 2019:\par Patient developed rash on upper extremities after D1, cycle #6 Carboplatin; chemotherapy discontinued.\par \par 2019:\par US –guided left breast biopsy – pathology breast consistent with intraductal papilloma left breast with ductal hyperplasia, adenosis, apocrine metaplasia. \par Left axillary LN biopsy – metastatic adenocarcinoma with papillary features, compatible with metastatic disease from known endometrial primary.\par \par 19- started Pembrolizumab.\par  \par 19-  dropped from 163 to 28.\par \par  [de-identified] : poorly differentiated, endometrioid [de-identified] :  [FreeTextEntry1] : neoadjuvant Taxol/ Carboplatin  [de-identified] : Returning for follow up; last seen in office May, when she started treatment with Pembrolizumab, after evidence of metastatic disease in axilla. She has reported peripheral sensory neuropathy in her hands since June 2019.Her pain is getting progressively worse, she reports stiffness in both hands in the morning. She has not taken any medication for pain. She is elated that her tumor marker though has dropped for the first time in normal range ( 163 to 28). Denies changes in appetite. Continues to present insomnia, chronic. Continues to take vitamin D 50,000 IU weekly. Last staging PET scan done on March 20, 2019.  Working part time.Accompanied by her son, Víctor.\par

## 2019-08-08 NOTE — ASSESSMENT
[FreeTextEntry1] : Ms. TIM and her son 's questions were answered to their satisfaction. She will return to the office in 3 months.\par \par

## 2019-08-09 DIAGNOSIS — C54.1 MALIGNANT NEOPLASM OF ENDOMETRIUM: ICD-10-CM

## 2019-08-09 DIAGNOSIS — R69 ILLNESS, UNSPECIFIED: ICD-10-CM

## 2019-08-26 ENCOUNTER — FORM ENCOUNTER (OUTPATIENT)
Age: 64
End: 2019-08-26

## 2019-08-27 ENCOUNTER — APPOINTMENT (OUTPATIENT)
Dept: MRI IMAGING | Facility: CLINIC | Age: 64
End: 2019-08-27
Payer: COMMERCIAL

## 2019-08-27 ENCOUNTER — OUTPATIENT (OUTPATIENT)
Dept: OUTPATIENT SERVICES | Facility: HOSPITAL | Age: 64
LOS: 1 days | End: 2019-08-27
Payer: COMMERCIAL

## 2019-08-27 DIAGNOSIS — R20.2 PARESTHESIA OF SKIN: ICD-10-CM

## 2019-08-27 PROCEDURE — 72156 MRI NECK SPINE W/O & W/DYE: CPT | Mod: 26

## 2019-08-27 PROCEDURE — 72156 MRI NECK SPINE W/O & W/DYE: CPT

## 2019-08-29 ENCOUNTER — OUTPATIENT (OUTPATIENT)
Dept: OUTPATIENT SERVICES | Facility: HOSPITAL | Age: 64
LOS: 1 days | End: 2019-08-29
Payer: COMMERCIAL

## 2019-08-29 VITALS — SYSTOLIC BLOOD PRESSURE: 141 MMHG | DIASTOLIC BLOOD PRESSURE: 85 MMHG | HEART RATE: 78 BPM

## 2019-08-29 VITALS
WEIGHT: 142.42 LBS | TEMPERATURE: 98 F | HEART RATE: 87 BPM | SYSTOLIC BLOOD PRESSURE: 158 MMHG | DIASTOLIC BLOOD PRESSURE: 84 MMHG

## 2019-08-29 DIAGNOSIS — R69 ILLNESS, UNSPECIFIED: ICD-10-CM

## 2019-08-29 DIAGNOSIS — C54.1 MALIGNANT NEOPLASM OF ENDOMETRIUM: ICD-10-CM

## 2019-08-29 LAB
ALBUMIN SERPL ELPH-MCNC: 4.2 G/DL — SIGNIFICANT CHANGE UP (ref 3.3–5)
ALP SERPL-CCNC: 74 U/L — SIGNIFICANT CHANGE UP (ref 40–120)
ALT FLD-CCNC: 24 U/L — SIGNIFICANT CHANGE UP (ref 12–78)
ANION GAP SERPL CALC-SCNC: 5 MMOL/L — SIGNIFICANT CHANGE UP (ref 5–17)
AST SERPL-CCNC: 10 U/L — LOW (ref 15–37)
BASOPHILS # BLD AUTO: 0.03 K/UL — SIGNIFICANT CHANGE UP (ref 0–0.2)
BASOPHILS NFR BLD AUTO: 0.4 % — SIGNIFICANT CHANGE UP (ref 0–2)
BILIRUB SERPL-MCNC: 0.7 MG/DL — SIGNIFICANT CHANGE UP (ref 0.2–1.2)
BUN SERPL-MCNC: 12 MG/DL — SIGNIFICANT CHANGE UP (ref 7–23)
CALCIUM SERPL-MCNC: 9.4 MG/DL — SIGNIFICANT CHANGE UP (ref 8.5–10.1)
CANCER AG125 SERPL-ACNC: 27 U/ML — SIGNIFICANT CHANGE UP
CHLORIDE SERPL-SCNC: 103 MMOL/L — SIGNIFICANT CHANGE UP (ref 96–108)
CO2 SERPL-SCNC: 28 MMOL/L — SIGNIFICANT CHANGE UP (ref 22–31)
CREAT SERPL-MCNC: 0.74 MG/DL — SIGNIFICANT CHANGE UP (ref 0.5–1.3)
EOSINOPHIL # BLD AUTO: 0.1 K/UL — SIGNIFICANT CHANGE UP (ref 0–0.5)
EOSINOPHIL NFR BLD AUTO: 1.5 % — SIGNIFICANT CHANGE UP (ref 0–6)
GLUCOSE SERPL-MCNC: 138 MG/DL — HIGH (ref 70–99)
HCT VFR BLD CALC: 38.4 % — SIGNIFICANT CHANGE UP (ref 34.5–45)
HGB BLD-MCNC: 13 G/DL — SIGNIFICANT CHANGE UP (ref 11.5–15.5)
IMM GRANULOCYTES NFR BLD AUTO: 0.3 % — SIGNIFICANT CHANGE UP (ref 0–1.5)
LYMPHOCYTES # BLD AUTO: 1.98 K/UL — SIGNIFICANT CHANGE UP (ref 1–3.3)
LYMPHOCYTES # BLD AUTO: 29.5 % — SIGNIFICANT CHANGE UP (ref 13–44)
MCHC RBC-ENTMCNC: 32.5 PG — SIGNIFICANT CHANGE UP (ref 27–34)
MCHC RBC-ENTMCNC: 33.9 GM/DL — SIGNIFICANT CHANGE UP (ref 32–36)
MCV RBC AUTO: 96 FL — SIGNIFICANT CHANGE UP (ref 80–100)
MONOCYTES # BLD AUTO: 0.51 K/UL — SIGNIFICANT CHANGE UP (ref 0–0.9)
MONOCYTES NFR BLD AUTO: 7.6 % — SIGNIFICANT CHANGE UP (ref 2–14)
NEUTROPHILS # BLD AUTO: 4.08 K/UL — SIGNIFICANT CHANGE UP (ref 1.8–7.4)
NEUTROPHILS NFR BLD AUTO: 60.7 % — SIGNIFICANT CHANGE UP (ref 43–77)
PLATELET # BLD AUTO: 319 K/UL — SIGNIFICANT CHANGE UP (ref 150–400)
POTASSIUM SERPL-MCNC: 4.2 MMOL/L — SIGNIFICANT CHANGE UP (ref 3.5–5.3)
POTASSIUM SERPL-SCNC: 4.2 MMOL/L — SIGNIFICANT CHANGE UP (ref 3.5–5.3)
PROT SERPL-MCNC: 7.2 GM/DL — SIGNIFICANT CHANGE UP (ref 6–8.3)
RBC # BLD: 4 M/UL — SIGNIFICANT CHANGE UP (ref 3.8–5.2)
RBC # FLD: 12.6 % — SIGNIFICANT CHANGE UP (ref 10.3–14.5)
SODIUM SERPL-SCNC: 136 MMOL/L — SIGNIFICANT CHANGE UP (ref 135–145)
WBC # BLD: 6.72 K/UL — SIGNIFICANT CHANGE UP (ref 3.8–10.5)
WBC # FLD AUTO: 6.72 K/UL — SIGNIFICANT CHANGE UP (ref 3.8–10.5)

## 2019-08-29 PROCEDURE — 96413 CHEMO IV INFUSION 1 HR: CPT

## 2019-08-29 PROCEDURE — 36415 COLL VENOUS BLD VENIPUNCTURE: CPT

## 2019-08-29 PROCEDURE — 85025 COMPLETE CBC W/AUTO DIFF WBC: CPT

## 2019-08-29 PROCEDURE — 86304 IMMUNOASSAY TUMOR CA 125: CPT

## 2019-08-29 PROCEDURE — 80053 COMPREHEN METABOLIC PANEL: CPT

## 2019-08-29 RX ORDER — SODIUM CHLORIDE 9 MG/ML
250 INJECTION INTRAMUSCULAR; INTRAVENOUS; SUBCUTANEOUS ONCE
Refills: 0 | Status: COMPLETED | OUTPATIENT
Start: 2019-08-29 | End: 2019-08-29

## 2019-08-29 RX ORDER — PEMBROLIZUMAB 25 MG/ML
200 INJECTION, SOLUTION INTRAVENOUS ONCE
Refills: 0 | Status: COMPLETED | OUTPATIENT
Start: 2019-08-29 | End: 2019-08-29

## 2019-08-29 RX ADMIN — SODIUM CHLORIDE 125 MILLILITER(S): 9 INJECTION INTRAMUSCULAR; INTRAVENOUS; SUBCUTANEOUS at 09:55

## 2019-08-29 RX ADMIN — PEMBROLIZUMAB 216 MILLIGRAM(S): 25 INJECTION, SOLUTION INTRAVENOUS at 10:20

## 2019-08-29 RX ADMIN — SODIUM CHLORIDE 250 MILLILITER(S): 9 INJECTION INTRAMUSCULAR; INTRAVENOUS; SUBCUTANEOUS at 10:55

## 2019-08-29 RX ADMIN — PEMBROLIZUMAB 200 MILLIGRAM(S): 25 INJECTION, SOLUTION INTRAVENOUS at 10:52

## 2019-09-17 RX ORDER — SODIUM CHLORIDE 9 MG/ML
250 INJECTION INTRAMUSCULAR; INTRAVENOUS; SUBCUTANEOUS
Refills: 0 | Status: DISCONTINUED | OUTPATIENT
Start: 2019-09-19 | End: 2020-01-05

## 2019-09-19 ENCOUNTER — OUTPATIENT (OUTPATIENT)
Dept: OUTPATIENT SERVICES | Facility: HOSPITAL | Age: 64
LOS: 1 days | End: 2019-09-19
Payer: COMMERCIAL

## 2019-09-19 VITALS
HEIGHT: 60 IN | WEIGHT: 137.13 LBS | HEART RATE: 134 BPM | SYSTOLIC BLOOD PRESSURE: 167 MMHG | DIASTOLIC BLOOD PRESSURE: 98 MMHG | TEMPERATURE: 98 F

## 2019-09-19 VITALS — DIASTOLIC BLOOD PRESSURE: 84 MMHG | HEART RATE: 97 BPM | SYSTOLIC BLOOD PRESSURE: 130 MMHG

## 2019-09-19 DIAGNOSIS — R69 ILLNESS, UNSPECIFIED: ICD-10-CM

## 2019-09-19 DIAGNOSIS — C54.1 MALIGNANT NEOPLASM OF ENDOMETRIUM: ICD-10-CM

## 2019-09-19 PROCEDURE — 96372 THER/PROPH/DIAG INJ SC/IM: CPT

## 2019-09-19 PROCEDURE — 96413 CHEMO IV INFUSION 1 HR: CPT

## 2019-09-19 RX ORDER — PREGABALIN 225 MG/1
1000 CAPSULE ORAL ONCE
Refills: 0 | Status: COMPLETED | OUTPATIENT
Start: 2019-09-19 | End: 2019-09-19

## 2019-09-19 RX ORDER — PEMBROLIZUMAB 25 MG/ML
200 INJECTION, SOLUTION INTRAVENOUS ONCE
Refills: 0 | Status: COMPLETED | OUTPATIENT
Start: 2019-09-19 | End: 2019-09-19

## 2019-09-19 RX ORDER — ALBUTEROL 90 UG/1
0 AEROSOL, METERED ORAL
Qty: 0 | Refills: 0 | DISCHARGE

## 2019-09-19 RX ADMIN — PEMBROLIZUMAB 216 MILLIGRAM(S): 25 INJECTION, SOLUTION INTRAVENOUS at 10:23

## 2019-09-19 RX ADMIN — SODIUM CHLORIDE 30 MILLILITER(S): 9 INJECTION INTRAMUSCULAR; INTRAVENOUS; SUBCUTANEOUS at 10:31

## 2019-09-19 RX ADMIN — PREGABALIN 1000 MICROGRAM(S): 225 CAPSULE ORAL at 10:28

## 2019-09-20 DIAGNOSIS — E53.8 DEFICIENCY OF OTHER SPECIFIED B GROUP VITAMINS: ICD-10-CM

## 2019-10-01 ENCOUNTER — INPATIENT (INPATIENT)
Facility: HOSPITAL | Age: 64
LOS: 5 days | Discharge: HOME HEALTH | DRG: 420 | End: 2019-10-07
Attending: INTERNAL MEDICINE | Admitting: INTERNAL MEDICINE
Payer: COMMERCIAL

## 2019-10-01 VITALS
WEIGHT: 138.89 LBS | DIASTOLIC BLOOD PRESSURE: 72 MMHG | TEMPERATURE: 98 F | OXYGEN SATURATION: 100 % | HEART RATE: 130 BPM | SYSTOLIC BLOOD PRESSURE: 103 MMHG | RESPIRATION RATE: 22 BRPM | HEIGHT: 60 IN

## 2019-10-01 DIAGNOSIS — E11.10 TYPE 2 DIABETES MELLITUS WITH KETOACIDOSIS WITHOUT COMA: ICD-10-CM

## 2019-10-01 LAB
ADD ON TEST-SPECIMEN IN LAB: SIGNIFICANT CHANGE UP
ALBUMIN SERPL ELPH-MCNC: 5.1 G/DL — HIGH (ref 3.3–5)
ALP SERPL-CCNC: 109 U/L — SIGNIFICANT CHANGE UP (ref 40–120)
ALT FLD-CCNC: 20 U/L — SIGNIFICANT CHANGE UP (ref 12–78)
ANION GAP SERPL CALC-SCNC: 18 MMOL/L — HIGH (ref 5–17)
ANION GAP SERPL CALC-SCNC: 27 MMOL/L — HIGH (ref 5–17)
APPEARANCE UR: CLEAR — SIGNIFICANT CHANGE UP
APTT BLD: 30.6 SEC — SIGNIFICANT CHANGE UP (ref 27.5–36.3)
AST SERPL-CCNC: 5 U/L — LOW (ref 15–37)
BASE EXCESS BLDV CALC-SCNC: -27 MMOL/L — LOW (ref -2–2)
BASOPHILS # BLD AUTO: 0 K/UL — SIGNIFICANT CHANGE UP (ref 0–0.2)
BASOPHILS # BLD AUTO: 0.05 K/UL — SIGNIFICANT CHANGE UP (ref 0–0.2)
BASOPHILS NFR BLD AUTO: 0 % — SIGNIFICANT CHANGE UP (ref 0–2)
BASOPHILS NFR BLD AUTO: 0.2 % — SIGNIFICANT CHANGE UP (ref 0–2)
BILIRUB SERPL-MCNC: 0.7 MG/DL — SIGNIFICANT CHANGE UP (ref 0.2–1.2)
BILIRUB UR-MCNC: NEGATIVE — SIGNIFICANT CHANGE UP
BUN SERPL-MCNC: 19 MG/DL — SIGNIFICANT CHANGE UP (ref 7–23)
BUN SERPL-MCNC: 24 MG/DL — HIGH (ref 7–23)
CALCIUM SERPL-MCNC: 8.1 MG/DL — LOW (ref 8.5–10.1)
CALCIUM SERPL-MCNC: 9.6 MG/DL — SIGNIFICANT CHANGE UP (ref 8.5–10.1)
CHLORIDE SERPL-SCNC: 100 MMOL/L — SIGNIFICANT CHANGE UP (ref 96–108)
CHLORIDE SERPL-SCNC: 114 MMOL/L — HIGH (ref 96–108)
CO2 SERPL-SCNC: 5 MMOL/L — CRITICAL LOW (ref 22–31)
CO2 SERPL-SCNC: 8 MMOL/L — CRITICAL LOW (ref 22–31)
COLOR SPEC: YELLOW — SIGNIFICANT CHANGE UP
CREAT SERPL-MCNC: 0.84 MG/DL — SIGNIFICANT CHANGE UP (ref 0.5–1.3)
CREAT SERPL-MCNC: 1.33 MG/DL — HIGH (ref 0.5–1.3)
DIFF PNL FLD: ABNORMAL
EOSINOPHIL # BLD AUTO: 0 K/UL — SIGNIFICANT CHANGE UP (ref 0–0.5)
EOSINOPHIL # BLD AUTO: 0 K/UL — SIGNIFICANT CHANGE UP (ref 0–0.5)
EOSINOPHIL NFR BLD AUTO: 0 % — SIGNIFICANT CHANGE UP (ref 0–6)
EOSINOPHIL NFR BLD AUTO: 0 % — SIGNIFICANT CHANGE UP (ref 0–6)
GLUCOSE SERPL-MCNC: 212 MG/DL — HIGH (ref 70–99)
GLUCOSE SERPL-MCNC: 517 MG/DL — CRITICAL HIGH (ref 70–99)
GLUCOSE UR QL: 1000 MG/DL
HCO3 BLDV-SCNC: 5 MMOL/L — LOW (ref 21–29)
HCT VFR BLD CALC: 38.9 % — SIGNIFICANT CHANGE UP (ref 34.5–45)
HCT VFR BLD CALC: 44.9 % — SIGNIFICANT CHANGE UP (ref 34.5–45)
HGB BLD-MCNC: 12.9 G/DL — SIGNIFICANT CHANGE UP (ref 11.5–15.5)
HGB BLD-MCNC: 15 G/DL — SIGNIFICANT CHANGE UP (ref 11.5–15.5)
IMM GRANULOCYTES NFR BLD AUTO: 2.4 % — HIGH (ref 0–1.5)
INR BLD: 0.97 RATIO — SIGNIFICANT CHANGE UP (ref 0.88–1.16)
KETONES UR-MCNC: ABNORMAL
LACTATE SERPL-SCNC: 3.8 MMOL/L — HIGH (ref 0.7–2)
LEUKOCYTE ESTERASE UR-ACNC: ABNORMAL
LIDOCAIN IGE QN: 131 U/L — SIGNIFICANT CHANGE UP (ref 73–393)
LYMPHOCYTES # BLD AUTO: 1.79 K/UL — SIGNIFICANT CHANGE UP (ref 1–3.3)
LYMPHOCYTES # BLD AUTO: 2.01 K/UL — SIGNIFICANT CHANGE UP (ref 1–3.3)
LYMPHOCYTES # BLD AUTO: 7 % — LOW (ref 13–44)
LYMPHOCYTES # BLD AUTO: 8.3 % — LOW (ref 13–44)
MAGNESIUM SERPL-MCNC: 1.9 MG/DL — SIGNIFICANT CHANGE UP (ref 1.6–2.6)
MCHC RBC-ENTMCNC: 32.3 PG — SIGNIFICANT CHANGE UP (ref 27–34)
MCHC RBC-ENTMCNC: 32.5 PG — SIGNIFICANT CHANGE UP (ref 27–34)
MCHC RBC-ENTMCNC: 33.2 GM/DL — SIGNIFICANT CHANGE UP (ref 32–36)
MCHC RBC-ENTMCNC: 33.4 GM/DL — SIGNIFICANT CHANGE UP (ref 32–36)
MCV RBC AUTO: 96.8 FL — SIGNIFICANT CHANGE UP (ref 80–100)
MCV RBC AUTO: 98 FL — SIGNIFICANT CHANGE UP (ref 80–100)
MONOCYTES # BLD AUTO: 0.51 K/UL — SIGNIFICANT CHANGE UP (ref 0–0.9)
MONOCYTES # BLD AUTO: 1.42 K/UL — HIGH (ref 0–0.9)
MONOCYTES NFR BLD AUTO: 2 % — SIGNIFICANT CHANGE UP (ref 2–14)
MONOCYTES NFR BLD AUTO: 5.9 % — SIGNIFICANT CHANGE UP (ref 2–14)
NEUTROPHILS # BLD AUTO: 20.18 K/UL — HIGH (ref 1.8–7.4)
NEUTROPHILS # BLD AUTO: 23.33 K/UL — HIGH (ref 1.8–7.4)
NEUTROPHILS NFR BLD AUTO: 83.2 % — HIGH (ref 43–77)
NEUTROPHILS NFR BLD AUTO: 88 % — HIGH (ref 43–77)
NITRITE UR-MCNC: NEGATIVE — SIGNIFICANT CHANGE UP
NRBC # BLD: SIGNIFICANT CHANGE UP /100 WBCS (ref 0–0)
PCO2 BLDV: 21 MMHG — LOW (ref 35–50)
PH BLDV: 6.99 — CRITICAL LOW (ref 7.35–7.45)
PH UR: 5 — SIGNIFICANT CHANGE UP (ref 5–8)
PHOSPHATE SERPL-MCNC: 2.3 MG/DL — LOW (ref 2.5–4.5)
PLATELET # BLD AUTO: 420 K/UL — HIGH (ref 150–400)
PLATELET # BLD AUTO: 576 K/UL — HIGH (ref 150–400)
PO2 BLDV: 163 MMHG — HIGH (ref 25–45)
POTASSIUM SERPL-MCNC: 4.4 MMOL/L — SIGNIFICANT CHANGE UP (ref 3.5–5.3)
POTASSIUM SERPL-MCNC: 5.3 MMOL/L — SIGNIFICANT CHANGE UP (ref 3.5–5.3)
POTASSIUM SERPL-SCNC: 4.4 MMOL/L — SIGNIFICANT CHANGE UP (ref 3.5–5.3)
POTASSIUM SERPL-SCNC: 5.3 MMOL/L — SIGNIFICANT CHANGE UP (ref 3.5–5.3)
PROT SERPL-MCNC: 8.7 GM/DL — HIGH (ref 6–8.3)
PROT UR-MCNC: 100 MG/DL
PROTHROM AB SERPL-ACNC: 10.8 SEC — SIGNIFICANT CHANGE UP (ref 10–12.9)
RAPID RVP RESULT: SIGNIFICANT CHANGE UP
RBC # BLD: 3.97 M/UL — SIGNIFICANT CHANGE UP (ref 3.8–5.2)
RBC # BLD: 4.64 M/UL — SIGNIFICANT CHANGE UP (ref 3.8–5.2)
RBC # FLD: 12.8 % — SIGNIFICANT CHANGE UP (ref 10.3–14.5)
RBC # FLD: 13.1 % — SIGNIFICANT CHANGE UP (ref 10.3–14.5)
SAO2 % BLDV: 97 % — HIGH (ref 67–88)
SODIUM SERPL-SCNC: 132 MMOL/L — LOW (ref 135–145)
SODIUM SERPL-SCNC: 140 MMOL/L — SIGNIFICANT CHANGE UP (ref 135–145)
SP GR SPEC: 1.02 — SIGNIFICANT CHANGE UP (ref 1.01–1.02)
TROPONIN I SERPL-MCNC: <0.015 NG/ML — SIGNIFICANT CHANGE UP (ref 0.01–0.04)
UROBILINOGEN FLD QL: NEGATIVE MG/DL — SIGNIFICANT CHANGE UP
WBC # BLD: 24.25 K/UL — HIGH (ref 3.8–10.5)
WBC # BLD: 25.64 K/UL — HIGH (ref 3.8–10.5)
WBC # FLD AUTO: 24.25 K/UL — HIGH (ref 3.8–10.5)
WBC # FLD AUTO: 25.64 K/UL — HIGH (ref 3.8–10.5)

## 2019-10-01 PROCEDURE — 83735 ASSAY OF MAGNESIUM: CPT

## 2019-10-01 PROCEDURE — 86803 HEPATITIS C AB TEST: CPT

## 2019-10-01 PROCEDURE — 85027 COMPLETE CBC AUTOMATED: CPT

## 2019-10-01 PROCEDURE — 85025 COMPLETE CBC W/AUTO DIFF WBC: CPT

## 2019-10-01 PROCEDURE — 71275 CT ANGIOGRAPHY CHEST: CPT | Mod: 26

## 2019-10-01 PROCEDURE — 84443 ASSAY THYROID STIM HORMONE: CPT

## 2019-10-01 PROCEDURE — 36415 COLL VENOUS BLD VENIPUNCTURE: CPT

## 2019-10-01 PROCEDURE — 93010 ELECTROCARDIOGRAM REPORT: CPT

## 2019-10-01 PROCEDURE — 74177 CT ABD & PELVIS W/CONTRAST: CPT | Mod: 26

## 2019-10-01 PROCEDURE — 84681 ASSAY OF C-PEPTIDE: CPT

## 2019-10-01 PROCEDURE — 82962 GLUCOSE BLOOD TEST: CPT

## 2019-10-01 PROCEDURE — 84100 ASSAY OF PHOSPHORUS: CPT

## 2019-10-01 PROCEDURE — 80048 BASIC METABOLIC PNL TOTAL CA: CPT

## 2019-10-01 PROCEDURE — 83036 HEMOGLOBIN GLYCOSYLATED A1C: CPT

## 2019-10-01 RX ORDER — INSULIN HUMAN 100 [IU]/ML
6 INJECTION, SOLUTION SUBCUTANEOUS
Qty: 100 | Refills: 0 | Status: DISCONTINUED | OUTPATIENT
Start: 2019-10-01 | End: 2019-10-02

## 2019-10-01 RX ORDER — ALBUTEROL 90 UG/1
0 AEROSOL, METERED ORAL
Qty: 0 | Refills: 0 | DISCHARGE

## 2019-10-01 RX ORDER — SODIUM CHLORIDE 9 MG/ML
1000 INJECTION INTRAMUSCULAR; INTRAVENOUS; SUBCUTANEOUS ONCE
Refills: 0 | Status: COMPLETED | OUTPATIENT
Start: 2019-10-01 | End: 2019-10-01

## 2019-10-01 RX ORDER — ONDANSETRON 8 MG/1
4 TABLET, FILM COATED ORAL ONCE
Refills: 0 | Status: COMPLETED | OUTPATIENT
Start: 2019-10-01 | End: 2019-10-01

## 2019-10-01 RX ORDER — DEXTROSE MONOHYDRATE, SODIUM CHLORIDE, AND POTASSIUM CHLORIDE 50; .745; 4.5 G/1000ML; G/1000ML; G/1000ML
1000 INJECTION, SOLUTION INTRAVENOUS
Refills: 0 | Status: DISCONTINUED | OUTPATIENT
Start: 2019-10-01 | End: 2019-10-03

## 2019-10-01 RX ORDER — ENOXAPARIN SODIUM 100 MG/ML
40 INJECTION SUBCUTANEOUS DAILY
Refills: 0 | Status: DISCONTINUED | OUTPATIENT
Start: 2019-10-01 | End: 2019-10-07

## 2019-10-01 RX ORDER — VANCOMYCIN HCL 1 G
1000 VIAL (EA) INTRAVENOUS ONCE
Refills: 0 | Status: COMPLETED | OUTPATIENT
Start: 2019-10-01 | End: 2019-10-01

## 2019-10-01 RX ORDER — SODIUM CHLORIDE 9 MG/ML
2000 INJECTION INTRAMUSCULAR; INTRAVENOUS; SUBCUTANEOUS ONCE
Refills: 0 | Status: COMPLETED | OUTPATIENT
Start: 2019-10-01 | End: 2019-10-01

## 2019-10-01 RX ORDER — CEFEPIME 1 G/1
1000 INJECTION, POWDER, FOR SOLUTION INTRAMUSCULAR; INTRAVENOUS EVERY 12 HOURS
Refills: 0 | Status: DISCONTINUED | OUTPATIENT
Start: 2019-10-01 | End: 2019-10-03

## 2019-10-01 RX ORDER — INSULIN HUMAN 100 [IU]/ML
6 INJECTION, SOLUTION SUBCUTANEOUS
Qty: 50 | Refills: 0 | Status: DISCONTINUED | OUTPATIENT
Start: 2019-10-01 | End: 2019-10-02

## 2019-10-01 RX ORDER — CHLORHEXIDINE GLUCONATE 213 G/1000ML
1 SOLUTION TOPICAL
Refills: 0 | Status: DISCONTINUED | OUTPATIENT
Start: 2019-10-01 | End: 2019-10-03

## 2019-10-01 RX ADMIN — SODIUM CHLORIDE 1000 MILLILITER(S): 9 INJECTION INTRAMUSCULAR; INTRAVENOUS; SUBCUTANEOUS at 20:02

## 2019-10-01 RX ADMIN — Medication 62.5 MILLIMOLE(S): at 23:53

## 2019-10-01 RX ADMIN — SODIUM CHLORIDE 2000 MILLILITER(S): 9 INJECTION INTRAMUSCULAR; INTRAVENOUS; SUBCUTANEOUS at 17:28

## 2019-10-01 RX ADMIN — CEFEPIME 1000 MILLIGRAM(S): 1 INJECTION, POWDER, FOR SOLUTION INTRAMUSCULAR; INTRAVENOUS at 17:58

## 2019-10-01 RX ADMIN — Medication 250 MILLIGRAM(S): at 17:59

## 2019-10-01 RX ADMIN — ONDANSETRON 4 MILLIGRAM(S): 8 TABLET, FILM COATED ORAL at 17:55

## 2019-10-01 RX ADMIN — INSULIN HUMAN 6 UNIT(S)/HR: 100 INJECTION, SOLUTION SUBCUTANEOUS at 18:59

## 2019-10-01 RX ADMIN — DEXTROSE MONOHYDRATE, SODIUM CHLORIDE, AND POTASSIUM CHLORIDE 200 MILLILITER(S): 50; .745; 4.5 INJECTION, SOLUTION INTRAVENOUS at 22:34

## 2019-10-01 NOTE — ED PROVIDER NOTE - OBJECTIVE STATEMENT
64 y/o female with PMHx of malignant neoplasm of endometrium, GERD, and anxiety presents to the ED c/o +epigastric pain x2 days and +vomiting yesterday. Also notes +decreased PO intake and +polydipsia. Endorses new onset of +SOB / +REVELES today. Pt on antineoplastic immunotherapy for endometrial CA. No bloody stools, fever, or CP. No hx of lung issues. Never a smoker. Allergic to carboplatin, Compazine, and Relafen.

## 2019-10-01 NOTE — H&P ADULT - HISTORY OF PRESENT ILLNESS
Patient is a 63 year old female who developed SOB over the past several days, epigastric pain, nausea, and vomiting as well as increase thirst, increased urination.  In the ED she was dx with DKA.  Of not she is on Keytruda for endometrial cancer.

## 2019-10-01 NOTE — ED ADULT NURSE NOTE - OBJECTIVE STATEMENT
Patient presents to ED complaining of SOB. Patient complaining of increased SOB, worsening with exertion beginning today. Patient complaining of N/V, epigastric pain and decreased po intake x 2 days. Patient states she was unable to catch her breath when ambulating. Patient complaining of of "feeling dry" on arrival. Patient tachycardic, tachypneic, 02sat 100%. Patient received immunotherapy for endometrial CA, last treatment 2 weeks ago. Patient dneies fever, chills, diarrhea, A&0x3

## 2019-10-01 NOTE — ED PROVIDER NOTE - PMH
Anxiety    B12 deficiency    GERD (gastroesophageal reflux disease)    Lymphadenopathy, axillary    Malignant neoplasm of endometrium    Psychophysiological insomnia    Vitamin D deficiency

## 2019-10-01 NOTE — H&P ADULT - ASSESSMENT
A/P: 63 year old female who is presenting in DKA from Keytruda    Plan:  Admit to the ICU  NPO but water  Insulin drip  BMP, Mg, Phos q4h until out of DKA  Will need Endo to see the patient  Hold Keytruda  NS with 20KCL at 250  Once  or< change to D51/2 nS at 200  Lovenox DVT prophylaxis  Venodynes    D/W Patient and family

## 2019-10-02 LAB
ANION GAP SERPL CALC-SCNC: 10 MMOL/L — SIGNIFICANT CHANGE UP (ref 5–17)
ANION GAP SERPL CALC-SCNC: 11 MMOL/L — SIGNIFICANT CHANGE UP (ref 5–17)
ANION GAP SERPL CALC-SCNC: 14 MMOL/L — SIGNIFICANT CHANGE UP (ref 5–17)
ANION GAP SERPL CALC-SCNC: 6 MMOL/L — SIGNIFICANT CHANGE UP (ref 5–17)
ANION GAP SERPL CALC-SCNC: 8 MMOL/L — SIGNIFICANT CHANGE UP (ref 5–17)
ANION GAP SERPL CALC-SCNC: 8 MMOL/L — SIGNIFICANT CHANGE UP (ref 5–17)
BUN SERPL-MCNC: 10 MG/DL — SIGNIFICANT CHANGE UP (ref 7–23)
BUN SERPL-MCNC: 12 MG/DL — SIGNIFICANT CHANGE UP (ref 7–23)
BUN SERPL-MCNC: 16 MG/DL — SIGNIFICANT CHANGE UP (ref 7–23)
BUN SERPL-MCNC: 4 MG/DL — LOW (ref 7–23)
BUN SERPL-MCNC: 6 MG/DL — LOW (ref 7–23)
BUN SERPL-MCNC: 7 MG/DL — SIGNIFICANT CHANGE UP (ref 7–23)
CALCIUM SERPL-MCNC: 7.6 MG/DL — LOW (ref 8.5–10.1)
CALCIUM SERPL-MCNC: 7.7 MG/DL — LOW (ref 8.5–10.1)
CALCIUM SERPL-MCNC: 7.7 MG/DL — LOW (ref 8.5–10.1)
CALCIUM SERPL-MCNC: 7.9 MG/DL — LOW (ref 8.5–10.1)
CALCIUM SERPL-MCNC: 8 MG/DL — LOW (ref 8.5–10.1)
CALCIUM SERPL-MCNC: 8.1 MG/DL — LOW (ref 8.5–10.1)
CHLORIDE SERPL-SCNC: 111 MMOL/L — HIGH (ref 96–108)
CHLORIDE SERPL-SCNC: 112 MMOL/L — HIGH (ref 96–108)
CHLORIDE SERPL-SCNC: 114 MMOL/L — HIGH (ref 96–108)
CO2 SERPL-SCNC: 11 MMOL/L — LOW (ref 22–31)
CO2 SERPL-SCNC: 14 MMOL/L — LOW (ref 22–31)
CO2 SERPL-SCNC: 17 MMOL/L — LOW (ref 22–31)
CO2 SERPL-SCNC: 18 MMOL/L — LOW (ref 22–31)
CO2 SERPL-SCNC: 19 MMOL/L — LOW (ref 22–31)
CO2 SERPL-SCNC: 19 MMOL/L — LOW (ref 22–31)
CREAT SERPL-MCNC: 0.44 MG/DL — LOW (ref 0.5–1.3)
CREAT SERPL-MCNC: 0.46 MG/DL — LOW (ref 0.5–1.3)
CREAT SERPL-MCNC: 0.5 MG/DL — SIGNIFICANT CHANGE UP (ref 0.5–1.3)
CREAT SERPL-MCNC: 0.58 MG/DL — SIGNIFICANT CHANGE UP (ref 0.5–1.3)
CREAT SERPL-MCNC: 0.62 MG/DL — SIGNIFICANT CHANGE UP (ref 0.5–1.3)
CREAT SERPL-MCNC: 0.73 MG/DL — SIGNIFICANT CHANGE UP (ref 0.5–1.3)
CULTURE RESULTS: SIGNIFICANT CHANGE UP
ESTIMATED AVERAGE GLUCOSE: 246 MG/DL — HIGH (ref 68–114)
GLUCOSE SERPL-MCNC: 122 MG/DL — HIGH (ref 70–99)
GLUCOSE SERPL-MCNC: 184 MG/DL — HIGH (ref 70–99)
GLUCOSE SERPL-MCNC: 250 MG/DL — HIGH (ref 70–99)
GLUCOSE SERPL-MCNC: 275 MG/DL — HIGH (ref 70–99)
GLUCOSE SERPL-MCNC: 80 MG/DL — SIGNIFICANT CHANGE UP (ref 70–99)
GLUCOSE SERPL-MCNC: 88 MG/DL — SIGNIFICANT CHANGE UP (ref 70–99)
HBA1C BLD-MCNC: 10.2 % — HIGH (ref 4–5.6)
HBA1C BLD-MCNC: 10.2 % — HIGH (ref 4–5.6)
HCV AB S/CO SERPL IA: 0.07 S/CO — SIGNIFICANT CHANGE UP (ref 0–0.99)
HCV AB SERPL-IMP: SIGNIFICANT CHANGE UP
MAGNESIUM SERPL-MCNC: 1.5 MG/DL — LOW (ref 1.6–2.6)
MAGNESIUM SERPL-MCNC: 1.6 MG/DL — SIGNIFICANT CHANGE UP (ref 1.6–2.6)
MAGNESIUM SERPL-MCNC: 1.6 MG/DL — SIGNIFICANT CHANGE UP (ref 1.6–2.6)
MAGNESIUM SERPL-MCNC: 1.7 MG/DL — SIGNIFICANT CHANGE UP (ref 1.6–2.6)
PHOSPHATE SERPL-MCNC: 1.4 MG/DL — LOW (ref 2.5–4.5)
PHOSPHATE SERPL-MCNC: 1.4 MG/DL — LOW (ref 2.5–4.5)
PHOSPHATE SERPL-MCNC: 1.5 MG/DL — LOW (ref 2.5–4.5)
PHOSPHATE SERPL-MCNC: 2.1 MG/DL — LOW (ref 2.5–4.5)
PHOSPHATE SERPL-MCNC: 2.2 MG/DL — LOW (ref 2.5–4.5)
PHOSPHATE SERPL-MCNC: 2.4 MG/DL — LOW (ref 2.5–4.5)
POTASSIUM SERPL-MCNC: 3.2 MMOL/L — LOW (ref 3.5–5.3)
POTASSIUM SERPL-MCNC: 3.3 MMOL/L — LOW (ref 3.5–5.3)
POTASSIUM SERPL-MCNC: 3.3 MMOL/L — LOW (ref 3.5–5.3)
POTASSIUM SERPL-MCNC: 3.4 MMOL/L — LOW (ref 3.5–5.3)
POTASSIUM SERPL-MCNC: 3.5 MMOL/L — SIGNIFICANT CHANGE UP (ref 3.5–5.3)
POTASSIUM SERPL-MCNC: 4.5 MMOL/L — SIGNIFICANT CHANGE UP (ref 3.5–5.3)
POTASSIUM SERPL-SCNC: 3.2 MMOL/L — LOW (ref 3.5–5.3)
POTASSIUM SERPL-SCNC: 3.3 MMOL/L — LOW (ref 3.5–5.3)
POTASSIUM SERPL-SCNC: 3.3 MMOL/L — LOW (ref 3.5–5.3)
POTASSIUM SERPL-SCNC: 3.4 MMOL/L — LOW (ref 3.5–5.3)
POTASSIUM SERPL-SCNC: 3.5 MMOL/L — SIGNIFICANT CHANGE UP (ref 3.5–5.3)
POTASSIUM SERPL-SCNC: 4.5 MMOL/L — SIGNIFICANT CHANGE UP (ref 3.5–5.3)
SODIUM SERPL-SCNC: 137 MMOL/L — SIGNIFICANT CHANGE UP (ref 135–145)
SODIUM SERPL-SCNC: 137 MMOL/L — SIGNIFICANT CHANGE UP (ref 135–145)
SODIUM SERPL-SCNC: 138 MMOL/L — SIGNIFICANT CHANGE UP (ref 135–145)
SODIUM SERPL-SCNC: 138 MMOL/L — SIGNIFICANT CHANGE UP (ref 135–145)
SODIUM SERPL-SCNC: 139 MMOL/L — SIGNIFICANT CHANGE UP (ref 135–145)
SODIUM SERPL-SCNC: 139 MMOL/L — SIGNIFICANT CHANGE UP (ref 135–145)
SPECIMEN SOURCE: SIGNIFICANT CHANGE UP
T3 SERPL-MCNC: 40 NG/DL — LOW (ref 80–200)
T4 AB SER-ACNC: 5.4 UG/DL — SIGNIFICANT CHANGE UP (ref 4.6–12)

## 2019-10-02 RX ORDER — DEXTROSE 50 % IN WATER 50 %
12.5 SYRINGE (ML) INTRAVENOUS ONCE
Refills: 0 | Status: DISCONTINUED | OUTPATIENT
Start: 2019-10-02 | End: 2019-10-07

## 2019-10-02 RX ORDER — POTASSIUM PHOSPHATE, MONOBASIC POTASSIUM PHOSPHATE, DIBASIC 236; 224 MG/ML; MG/ML
15 INJECTION, SOLUTION INTRAVENOUS ONCE
Refills: 0 | Status: DISCONTINUED | OUTPATIENT
Start: 2019-10-02 | End: 2019-10-02

## 2019-10-02 RX ORDER — INSULIN HUMAN 100 [IU]/ML
4 INJECTION, SOLUTION SUBCUTANEOUS
Qty: 50 | Refills: 0 | Status: DISCONTINUED | OUTPATIENT
Start: 2019-10-02 | End: 2019-10-02

## 2019-10-02 RX ORDER — POTASSIUM CHLORIDE 20 MEQ
10 PACKET (EA) ORAL
Refills: 0 | Status: COMPLETED | OUTPATIENT
Start: 2019-10-02 | End: 2019-10-02

## 2019-10-02 RX ORDER — POTASSIUM PHOSPHATE, MONOBASIC POTASSIUM PHOSPHATE, DIBASIC 236; 224 MG/ML; MG/ML
15 INJECTION, SOLUTION INTRAVENOUS ONCE
Refills: 0 | Status: COMPLETED | OUTPATIENT
Start: 2019-10-02 | End: 2019-10-02

## 2019-10-02 RX ORDER — ONDANSETRON 8 MG/1
4 TABLET, FILM COATED ORAL ONCE
Refills: 0 | Status: COMPLETED | OUTPATIENT
Start: 2019-10-02 | End: 2019-10-02

## 2019-10-02 RX ORDER — INSULIN HUMAN 100 [IU]/ML
2 INJECTION, SOLUTION SUBCUTANEOUS
Qty: 100 | Refills: 0 | Status: DISCONTINUED | OUTPATIENT
Start: 2019-10-02 | End: 2019-10-03

## 2019-10-02 RX ADMIN — Medication 62.5 MILLIMOLE(S): at 08:54

## 2019-10-02 RX ADMIN — ONDANSETRON 4 MILLIGRAM(S): 8 TABLET, FILM COATED ORAL at 10:15

## 2019-10-02 RX ADMIN — DEXTROSE MONOHYDRATE, SODIUM CHLORIDE, AND POTASSIUM CHLORIDE 200 MILLILITER(S): 50; .745; 4.5 INJECTION, SOLUTION INTRAVENOUS at 17:00

## 2019-10-02 RX ADMIN — CEFEPIME 1000 MILLIGRAM(S): 1 INJECTION, POWDER, FOR SOLUTION INTRAMUSCULAR; INTRAVENOUS at 17:21

## 2019-10-02 RX ADMIN — POTASSIUM PHOSPHATE, MONOBASIC POTASSIUM PHOSPHATE, DIBASIC 63.75 MILLIMOLE(S): 236; 224 INJECTION, SOLUTION INTRAVENOUS at 17:42

## 2019-10-02 RX ADMIN — Medication 100 MILLIEQUIVALENT(S): at 20:33

## 2019-10-02 RX ADMIN — Medication 100 MILLIEQUIVALENT(S): at 16:04

## 2019-10-02 RX ADMIN — CHLORHEXIDINE GLUCONATE 1 APPLICATION(S): 213 SOLUTION TOPICAL at 10:14

## 2019-10-02 RX ADMIN — CEFEPIME 1000 MILLIGRAM(S): 1 INJECTION, POWDER, FOR SOLUTION INTRAMUSCULAR; INTRAVENOUS at 06:30

## 2019-10-02 RX ADMIN — INSULIN HUMAN 6 UNIT(S)/HR: 100 INJECTION, SOLUTION SUBCUTANEOUS at 03:56

## 2019-10-02 RX ADMIN — Medication 100 MILLIEQUIVALENT(S): at 14:39

## 2019-10-02 RX ADMIN — DEXTROSE MONOHYDRATE, SODIUM CHLORIDE, AND POTASSIUM CHLORIDE 200 MILLILITER(S): 50; .745; 4.5 INJECTION, SOLUTION INTRAVENOUS at 03:39

## 2019-10-02 RX ADMIN — Medication 100 MILLIEQUIVALENT(S): at 13:23

## 2019-10-02 RX ADMIN — ENOXAPARIN SODIUM 40 MILLIGRAM(S): 100 INJECTION SUBCUTANEOUS at 12:06

## 2019-10-02 RX ADMIN — Medication 100 MILLIEQUIVALENT(S): at 22:42

## 2019-10-02 NOTE — PROGRESS NOTE ADULT - ASSESSMENT
63F w/ active endometrial cancer and anxiety who presented w/ SOB, epigastric pain, nausea, vomiting, polyuria, and polydipsia and subsequently admitted to ICU for DKA.     #DKA  - secondary to Keytruda  - per pt, onc NP saw her today and recommended continuing Keytruda but will have to balance w/ diabetes  - AG closed, but bicarb still low  - continue insulin drip   - continue q1hr BGM while on insulin drip   - continue q4hr BMP, Mg, and Phos until bicarb normalizes  - start transition to insulin SQ when bicarb normalizes  - NPO until off insulin drip   - continue IVF    #Hypokalemia and hypophosphatemia  - continue to monitor and replete as necessary     #Endometrial cancer  - next Keytruda was planned for 10/11  - Pt's outpatient onc aware of pt admission     #DVT PPx  - lovenox SQ    #Advanced directives: full code    #Dispo: transfer to floor when DKA resolved. Will likely be discharged to home when medically stable. Discharge not expected within 48 hours.     d/w Dr. Hernandez

## 2019-10-02 NOTE — PROGRESS NOTE ADULT - SUBJECTIVE AND OBJECTIVE BOX
63y old F with a PMHx of Endometrial CA, Anxiety who developed SOB over the past several days, epigastric pain, nausea, and vomiting as well as increase thirst, increased urination.  In the ED she was dx with DKA.  Of not she is on Keytruda for endometrial cancer.     10/2 - Patient seen and examined. Chart reviewed. Events noted. Remains on Insulin gtt for DKA.     PAST MEDICAL & SURGICAL HISTORY:  Chronic GERD  Anxiety  Endometrial cancer      FAMILY HISTORY:      Social Hx:    Allergies    carboplatin (Unknown)  Compazine (Seizure)  Relafen (Rash)    Intolerances        63y    Height (cm): 152.4 (10-01 @ 17:02)  Weight (kg): 63 (10-01 @ 17:02)  BMI (kg/m2): 27.1 (10-01 @ :02)    ICU Vital Signs Last 24 Hrs  T(C): 37.1 (02 Oct 2019 08:00), Max: 37.1 (02 Oct 2019 08:00)  T(F): 98.7 (02 Oct 2019 08:00), Max: 98.7 (02 Oct 2019 08:00)  HR: 92 (02 Oct 2019 08:00) (92 - 130)  BP: 119/64 (02 Oct 2019 08:00) (103/72 - 125/52)  BP(mean): 79 (02 Oct 2019 08:00) (63 - 79)  ABP: --  ABP(mean): --  RR: 21 (02 Oct 2019 08:00) (18 - 27)  SpO2: 99% (02 Oct 2019 08:00) (99% - 100%)          I&O's Summary    01 Oct 2019 07:01  -  02 Oct 2019 07:00  --------------------------------------------------------  IN: 2084 mL / OUT: 650 mL / NET: 1434 mL                              12.9   24.25 )-----------( 420      ( 01 Oct 2019 21:50 )             38.9       10    137  |  112<H>  |  12  ----------------------------<  250<H>  3.5   |  14<L>  |  0.62    Ca    7.7<L>      02 Oct 2019 06:27  Phos  2.2     10-  Mg     1.6     10-    TPro  8.7<H>  /  Alb  5.1<H>  /  TBili  0.7  /  DBili  x   /  AST  5<L>  /  ALT  20  /  AlkPhos  109  10-      CAPILLARY BLOOD GLUCOSE      POCT Blood Glucose.: 210 mg/dL (02 Oct 2019 09:01)  POCT Blood Glucose.: 219 mg/dL (02 Oct 2019 08:19)  POCT Blood Glucose.: 220 mg/dL (02 Oct 2019 07:19)  POCT Blood Glucose.: 244 mg/dL (02 Oct 2019 06:26)  POCT Blood Glucose.: 261 mg/dL (02 Oct 2019 05:13)  POCT Blood Glucose.: 306 mg/dL (02 Oct 2019 03:44)  POCT Blood Glucose.: 299 mg/dL (02 Oct 2019 02:34)  POCT Blood Glucose.: 271 mg/dL (02 Oct 2019 01:31)  POCT Blood Glucose.: 250 mg/dL (02 Oct 2019 00:29)  POCT Blood Glucose.: 208 mg/dL (01 Oct 2019 23:27)  POCT Blood Glucose.: 228 mg/dL (01 Oct 2019 22:23)  POCT Blood Glucose.: 280 mg/dL (01 Oct 2019 21:11)  POCT Blood Glucose.: 361 mg/dL (01 Oct 2019 20:16)  POCT Blood Glucose.: 478 mg/dL (01 Oct 2019 19:32)  POCT Blood Glucose.: 466 mg/dL (01 Oct 2019 18:58)      LIVER FUNCTIONS - ( 01 Oct 2019 17:21 )  Alb: 5.1 g/dL / Pro: 8.7 gm/dL / ALK PHOS: 109 U/L / ALT: 20 U/L / AST: 5 U/L / GGT: x             CARDIAC MARKERS ( 01 Oct 2019 17:21 )  <0.015 ng/mL / x     / x     / x     / x            PT/INR - ( 01 Oct 2019 17:21 )   PT: 10.8 sec;   INR: 0.97 ratio         PTT - ( 01 Oct 2019 17:21 )  PTT:30.6 sec        Urinalysis Basic - ( 01 Oct 2019 17:45 )    Color: Yellow / Appearance: Clear / S.025 / pH: x  Gluc: x / Ketone: Large  / Bili: Negative / Urobili: Negative mg/dL   Blood: x / Protein: 100 mg/dL / Nitrite: Negative   Leuk Esterase: Trace / RBC: 3-5 /HPF / WBC 0-2   Sq Epi: x / Non Sq Epi: Moderate / Bacteria: Few        MEDICATIONS  (STANDING):  cefepime  Injectable. 1000 milliGRAM(s) IV Push every 12 hours  chlorhexidine 4% Liquid 1 Application(s) Topical <User Schedule>  dextrose 5% + sodium chloride 0.45% with potassium chloride 20 mEq/L 1000 milliLiter(s) (200 mL/Hr) IV Continuous <Continuous>  enoxaparin Injectable 40 milliGRAM(s) SubCutaneous daily  insulin regular Infusion 6 Unit(s)/Hr (6 mL/Hr) IV Continuous <Continuous>  insulin regular Infusion 6 Unit(s)/Hr (6 mL/Hr) IV Continuous <Continuous>  ondansetron Injectable 4 milliGRAM(s) IV Push once  sodium chloride 0.9% with potassium chloride 20 mEq/L 1000 milliLiter(s) (250 mL/Hr) IV Continuous <Continuous>    MEDICATIONS  (PRN):  aluminum hydroxide/magnesium hydroxide/simethicone Suspension 30 milliLiter(s) Oral every 6 hours PRN Dyspepsia          Advanced Directives:  Discussed with:    Visit Information:    30-min CC Time is exclusive of billed procedures and/or teaching and/or routine family updates.

## 2019-10-02 NOTE — PROGRESS NOTE ADULT - ASSESSMENT
63y old F with:  Acute DKA  Endometrial CA - On Keytruda    Plan:  Cont ICU Care  Critically Ill  Acute DKA  Insulin gtt  Check serial lytes and replete   NPO   Hold Keytruda  Monitor renal function  Strict I/O's   DVT prophylaxis - Lovenox  I have updated patient at bedside regarding her current status, hospital course, plan of care, and prognosis with all questions answered in detail.

## 2019-10-02 NOTE — PROGRESS NOTE ADULT - SUBJECTIVE AND OBJECTIVE BOX
63F w/ active endometrial cancer and anxiety who presented w/ SOB, epigastric pain, nausea, vomiting, polyuria, and polydipsia and subsequently admitted to ICU for DKA.     SUBJECTIVE: Pt reports some indigestion which has improved w/ maalox. Reports nausea is under control as well and no episodes of vomiting today. Pt denies abdominal pain and diarrhea. She reports being hungry. Daughter was at bedside and discussed plan of care. Pt reports Pastora from Dr. Greer's office came by and said that they should continue the Keytruda, but will have to find a middle ground between keytruda and diabetes.     REVIEW OF SYSTEMS:  CONSTITUTIONAL: +mild generalized weakness, No fevers or chills  EYES/ENT: No visual changes; No vertigo or throat pain   RESPIRATORY: No cough, wheezing, hemoptysis; No shortness of breath  CARDIOVASCULAR: No chest pain or palpitations  GASTROINTESTINAL: see subjective  GENITOURINARY: No dysuria or hematuria  NEUROLOGICAL: No focal numbness or weakness  SKIN: No itching, burning, rashes, or lesions   All other review of systems is negative unless indicated above    Vital Signs Last 24 Hrs  T(C): 37 (02 Oct 2019 12:00), Max: 37.1 (02 Oct 2019 08:00)  T(F): 98.6 (02 Oct 2019 12:00), Max: 98.7 (02 Oct 2019 08:00)  HR: 91 (02 Oct 2019 13:00) (89 - 130)  BP: 122/58 (02 Oct 2019 13:00) (103/72 - 125/52)  BP(mean): 73 (02 Oct 2019 13:00) (63 - 79)  RR: 22 (02 Oct 2019 13:00) (17 - 27)  SpO2: 100% (02 Oct 2019 13:00) (99% - 100%)    I&O's Summary    01 Oct 2019 07:01  -  02 Oct 2019 07:00  --------------------------------------------------------  IN: 2084 mL / OUT: 650 mL / NET: 1434 mL    02 Oct 2019 07:01  -  02 Oct 2019 13:52  --------------------------------------------------------  IN: 0 mL / OUT: 460 mL / NET: -460 mL        CAPILLARY BLOOD GLUCOSE    POCT Blood Glucose.: 177 mg/dL (02 Oct 2019 13:12)  POCT Blood Glucose.: 165 mg/dL (02 Oct 2019 12:08)  POCT Blood Glucose.: 198 mg/dL (02 Oct 2019 11:12)  POCT Blood Glucose.: 236 mg/dL (02 Oct 2019 10:08)  POCT Blood Glucose.: 210 mg/dL (02 Oct 2019 09:01)  POCT Blood Glucose.: 219 mg/dL (02 Oct 2019 08:19)  POCT Blood Glucose.: 220 mg/dL (02 Oct 2019 07:19)  POCT Blood Glucose.: 244 mg/dL (02 Oct 2019 06:26)  POCT Blood Glucose.: 261 mg/dL (02 Oct 2019 05:13)  POCT Blood Glucose.: 306 mg/dL (02 Oct 2019 03:44)  POCT Blood Glucose.: 299 mg/dL (02 Oct 2019 02:34)  POCT Blood Glucose.: 271 mg/dL (02 Oct 2019 01:31)  POCT Blood Glucose.: 250 mg/dL (02 Oct 2019 00:29)  POCT Blood Glucose.: 208 mg/dL (01 Oct 2019 23:27)  POCT Blood Glucose.: 228 mg/dL (01 Oct 2019 22:23)  POCT Blood Glucose.: 280 mg/dL (01 Oct 2019 21:11)  POCT Blood Glucose.: 361 mg/dL (01 Oct 2019 20:16)  POCT Blood Glucose.: 478 mg/dL (01 Oct 2019 19:32)  POCT Blood Glucose.: 466 mg/dL (01 Oct 2019 18:58)      PHYSICAL EXAM:  Constitutional: NAD, awake and alert, well-developed  HEENT: EOMI, Normal Hearing, MMM  Respiratory: Breath sounds are clear bilaterally, No wheezing, rales or rhonchi  Cardiovascular: S1 and S2, regular rate and rhythm, no Murmurs, gallops or rubs  Gastrointestinal: Bowel Sounds present, soft, nontender, nondistended, no guarding, no rebound  Extremities: No peripheral edema  Vascular: 2+ peripheral pulses  Neurological: Alert and answering questions appropriately  Musculoskeletal: fair strength    MEDICATIONS:  MEDICATIONS  (STANDING):  cefepime  Injectable. 1000 milliGRAM(s) IV Push every 12 hours  chlorhexidine 4% Liquid 1 Application(s) Topical <User Schedule>  dextrose 5% + sodium chloride 0.45% with potassium chloride 20 mEq/L 1000 milliLiter(s) (200 mL/Hr) IV Continuous <Continuous>  dextrose 50% Injectable 12.5 Gram(s) IV Push once  enoxaparin Injectable 40 milliGRAM(s) SubCutaneous daily  insulin regular Infusion 2 Unit(s)/Hr (2 mL/Hr) IV Continuous <Continuous>  insulin regular Infusion 2 Unit(s)/Hr (2 mL/Hr) IV Continuous <Continuous>  potassium chloride  10 mEq/100 mL IVPB 10 milliEquivalent(s) IV Intermittent every 1 hour  sodium chloride 0.9% with potassium chloride 20 mEq/L 1000 milliLiter(s) (250 mL/Hr) IV Continuous <Continuous>      LABS: All Labs Reviewed:                        12.9   24.25 )-----------( 420      ( 01 Oct 2019 21:50 )             38.9     10-02    137  |  112<H>  |  10  ----------------------------<  184<H>  3.3<L>   |  19<L>  |  0.58    Ca    7.7<L>      02 Oct 2019 11:02  Phos  2.1     10-02  Mg     1.7     10-02    TPro  8.7<H>  /  Alb  5.1<H>  /  TBili  0.7  /  DBili  x   /  AST  5<L>  /  ALT  20  /  AlkPhos  109  10-01    PT/INR - ( 01 Oct 2019 17:21 )   PT: 10.8 sec;   INR: 0.97 ratio         PTT - ( 01 Oct 2019 17:21 )  PTT:30.6 sec  CARDIAC MARKERS ( 01 Oct 2019 17:21 )  <0.015 ng/mL / x     / x     / x     / x          Rapid Respiratory Viral Panel (10.01.19 @ 17:45)    Rapid RVP Result: NotDetec: This Respiratory Panel uses polymerase chain reaction (PCR) to detect for  adenovirus; coronavirus (HKU1, NL63, 229E, OC43); human metapneumovirus  (hMPV); human enterovirus/rhinovirus (Entero/RV); influenza A; influenza  A/H1; influenza A/H3; influenza A/H1-2009; influenza B; parainfluenza  viruses 1, 2, 3, 4; respiratory syncytial virus; Mycoplasma pneumoniae;  and Chlamydophila pneumoniae.    Urinalysis (10.01.19 @ 17:45)    Glucose Qualitative, Urine: 1000 mg/dL    Blood, Urine: Moderate    pH Urine: 5.0    Color: Yellow    Urine Appearance: Clear    Bilirubin: Negative    Ketone - Urine: Large    Specific Gravity: 1.025    Protein, Urine: 100 mg/dL    Urobilinogen: Negative mg/dL    Nitrite: Negative    Leukocyte Esterase Concentration: Trace  Urine Microscopic-Add On (NC) (10.01.19 @ 17:45)    Red Blood Cell - Urine: 3-5 /HPF    White Blood Cell - Urine: 0-2    Hyaline Casts: 3-5 /LPF    Bacteria: Few    Epithelial Cells: Moderate

## 2019-10-03 DIAGNOSIS — E09.9 DRUG OR CHEMICAL INDUCED DIABETES MELLITUS WITHOUT COMPLICATIONS: ICD-10-CM

## 2019-10-03 DIAGNOSIS — C54.1 MALIGNANT NEOPLASM OF ENDOMETRIUM: ICD-10-CM

## 2019-10-03 LAB
ANION GAP SERPL CALC-SCNC: 8 MMOL/L — SIGNIFICANT CHANGE UP (ref 5–17)
ANION GAP SERPL CALC-SCNC: 9 MMOL/L — SIGNIFICANT CHANGE UP (ref 5–17)
ANION GAP SERPL CALC-SCNC: 9 MMOL/L — SIGNIFICANT CHANGE UP (ref 5–17)
BUN SERPL-MCNC: 2 MG/DL — LOW (ref 7–23)
BUN SERPL-MCNC: 3 MG/DL — LOW (ref 7–23)
BUN SERPL-MCNC: 3 MG/DL — LOW (ref 7–23)
C PEPTIDE SERPL-MCNC: 0.5 NG/ML — LOW (ref 1.1–4.4)
CALCIUM SERPL-MCNC: 8.1 MG/DL — LOW (ref 8.5–10.1)
CHLORIDE SERPL-SCNC: 106 MMOL/L — SIGNIFICANT CHANGE UP (ref 96–108)
CHLORIDE SERPL-SCNC: 107 MMOL/L — SIGNIFICANT CHANGE UP (ref 96–108)
CHLORIDE SERPL-SCNC: 109 MMOL/L — HIGH (ref 96–108)
CO2 SERPL-SCNC: 17 MMOL/L — LOW (ref 22–31)
CO2 SERPL-SCNC: 21 MMOL/L — LOW (ref 22–31)
CO2 SERPL-SCNC: 22 MMOL/L — SIGNIFICANT CHANGE UP (ref 22–31)
CREAT SERPL-MCNC: 0.37 MG/DL — LOW (ref 0.5–1.3)
CREAT SERPL-MCNC: 0.52 MG/DL — SIGNIFICANT CHANGE UP (ref 0.5–1.3)
CREAT SERPL-MCNC: 0.56 MG/DL — SIGNIFICANT CHANGE UP (ref 0.5–1.3)
GLUCOSE SERPL-MCNC: 133 MG/DL — HIGH (ref 70–99)
GLUCOSE SERPL-MCNC: 172 MG/DL — HIGH (ref 70–99)
GLUCOSE SERPL-MCNC: 209 MG/DL — HIGH (ref 70–99)
HCT VFR BLD CALC: 34 % — LOW (ref 34.5–45)
HGB BLD-MCNC: 11.9 G/DL — SIGNIFICANT CHANGE UP (ref 11.5–15.5)
MAGNESIUM SERPL-MCNC: 2.1 MG/DL — SIGNIFICANT CHANGE UP (ref 1.6–2.6)
MAGNESIUM SERPL-MCNC: 2.2 MG/DL — SIGNIFICANT CHANGE UP (ref 1.6–2.6)
MAGNESIUM SERPL-MCNC: 2.4 MG/DL — SIGNIFICANT CHANGE UP (ref 1.6–2.6)
MCHC RBC-ENTMCNC: 32.2 PG — SIGNIFICANT CHANGE UP (ref 27–34)
MCHC RBC-ENTMCNC: 35 GM/DL — SIGNIFICANT CHANGE UP (ref 32–36)
MCV RBC AUTO: 91.9 FL — SIGNIFICANT CHANGE UP (ref 80–100)
PHOSPHATE SERPL-MCNC: 1.4 MG/DL — LOW (ref 2.5–4.5)
PHOSPHATE SERPL-MCNC: 2.1 MG/DL — LOW (ref 2.5–4.5)
PHOSPHATE SERPL-MCNC: 3.1 MG/DL — SIGNIFICANT CHANGE UP (ref 2.5–4.5)
PLATELET # BLD AUTO: 309 K/UL — SIGNIFICANT CHANGE UP (ref 150–400)
POTASSIUM SERPL-MCNC: 3.3 MMOL/L — LOW (ref 3.5–5.3)
POTASSIUM SERPL-MCNC: 4.2 MMOL/L — SIGNIFICANT CHANGE UP (ref 3.5–5.3)
POTASSIUM SERPL-MCNC: 4.3 MMOL/L — SIGNIFICANT CHANGE UP (ref 3.5–5.3)
POTASSIUM SERPL-SCNC: 3.3 MMOL/L — LOW (ref 3.5–5.3)
POTASSIUM SERPL-SCNC: 4.2 MMOL/L — SIGNIFICANT CHANGE UP (ref 3.5–5.3)
POTASSIUM SERPL-SCNC: 4.3 MMOL/L — SIGNIFICANT CHANGE UP (ref 3.5–5.3)
RBC # BLD: 3.7 M/UL — LOW (ref 3.8–5.2)
RBC # FLD: 13.2 % — SIGNIFICANT CHANGE UP (ref 10.3–14.5)
SODIUM SERPL-SCNC: 135 MMOL/L — SIGNIFICANT CHANGE UP (ref 135–145)
SODIUM SERPL-SCNC: 136 MMOL/L — SIGNIFICANT CHANGE UP (ref 135–145)
SODIUM SERPL-SCNC: 137 MMOL/L — SIGNIFICANT CHANGE UP (ref 135–145)
WBC # BLD: 9.21 K/UL — SIGNIFICANT CHANGE UP (ref 3.8–10.5)
WBC # FLD AUTO: 9.21 K/UL — SIGNIFICANT CHANGE UP (ref 3.8–10.5)

## 2019-10-03 PROCEDURE — 99223 1ST HOSP IP/OBS HIGH 75: CPT

## 2019-10-03 RX ORDER — SODIUM,POTASSIUM PHOSPHATES 278-250MG
2 POWDER IN PACKET (EA) ORAL
Refills: 0 | Status: DISCONTINUED | OUTPATIENT
Start: 2019-10-03 | End: 2019-10-04

## 2019-10-03 RX ORDER — POTASSIUM PHOSPHATE, MONOBASIC POTASSIUM PHOSPHATE, DIBASIC 236; 224 MG/ML; MG/ML
15 INJECTION, SOLUTION INTRAVENOUS ONCE
Refills: 0 | Status: COMPLETED | OUTPATIENT
Start: 2019-10-03 | End: 2019-10-03

## 2019-10-03 RX ORDER — POTASSIUM CHLORIDE 20 MEQ
10 PACKET (EA) ORAL
Refills: 0 | Status: COMPLETED | OUTPATIENT
Start: 2019-10-03 | End: 2019-10-03

## 2019-10-03 RX ORDER — INSULIN LISPRO 100/ML
VIAL (ML) SUBCUTANEOUS AT BEDTIME
Refills: 0 | Status: DISCONTINUED | OUTPATIENT
Start: 2019-10-03 | End: 2019-10-07

## 2019-10-03 RX ORDER — POTASSIUM CHLORIDE 20 MEQ
40 PACKET (EA) ORAL ONCE
Refills: 0 | Status: COMPLETED | OUTPATIENT
Start: 2019-10-03 | End: 2019-10-03

## 2019-10-03 RX ORDER — INSULIN GLARGINE 100 [IU]/ML
10 INJECTION, SOLUTION SUBCUTANEOUS EVERY MORNING
Refills: 0 | Status: DISCONTINUED | OUTPATIENT
Start: 2019-10-03 | End: 2019-10-05

## 2019-10-03 RX ORDER — INSULIN LISPRO 100/ML
VIAL (ML) SUBCUTANEOUS
Refills: 0 | Status: DISCONTINUED | OUTPATIENT
Start: 2019-10-03 | End: 2019-10-07

## 2019-10-03 RX ORDER — MAGNESIUM SULFATE 500 MG/ML
2 VIAL (ML) INJECTION ONCE
Refills: 0 | Status: COMPLETED | OUTPATIENT
Start: 2019-10-03 | End: 2019-10-03

## 2019-10-03 RX ADMIN — Medication 100 MILLIEQUIVALENT(S): at 18:37

## 2019-10-03 RX ADMIN — POTASSIUM PHOSPHATE, MONOBASIC POTASSIUM PHOSPHATE, DIBASIC 63.75 MILLIMOLE(S): 236; 224 INJECTION, SOLUTION INTRAVENOUS at 01:40

## 2019-10-03 RX ADMIN — Medication 50 GRAM(S): at 01:41

## 2019-10-03 RX ADMIN — Medication 2 PACKET(S): at 18:38

## 2019-10-03 RX ADMIN — Medication 2: at 16:54

## 2019-10-03 RX ADMIN — Medication 100 MILLIEQUIVALENT(S): at 04:20

## 2019-10-03 RX ADMIN — INSULIN GLARGINE 10 UNIT(S): 100 INJECTION, SOLUTION SUBCUTANEOUS at 10:03

## 2019-10-03 RX ADMIN — Medication 100 MILLIEQUIVALENT(S): at 05:14

## 2019-10-03 RX ADMIN — Medication 100 MILLIEQUIVALENT(S): at 00:02

## 2019-10-03 RX ADMIN — CHLORHEXIDINE GLUCONATE 1 APPLICATION(S): 213 SOLUTION TOPICAL at 05:43

## 2019-10-03 RX ADMIN — Medication 100 MILLIEQUIVALENT(S): at 03:11

## 2019-10-03 RX ADMIN — Medication 30 MILLILITER(S): at 18:48

## 2019-10-03 RX ADMIN — POTASSIUM PHOSPHATE, MONOBASIC POTASSIUM PHOSPHATE, DIBASIC 63.75 MILLIMOLE(S): 236; 224 INJECTION, SOLUTION INTRAVENOUS at 13:45

## 2019-10-03 RX ADMIN — Medication 100 MILLIEQUIVALENT(S): at 13:03

## 2019-10-03 RX ADMIN — Medication 40 MILLIEQUIVALENT(S): at 13:28

## 2019-10-03 RX ADMIN — CEFEPIME 1000 MILLIGRAM(S): 1 INJECTION, POWDER, FOR SOLUTION INTRAMUSCULAR; INTRAVENOUS at 06:09

## 2019-10-03 RX ADMIN — Medication 2 PACKET(S): at 13:27

## 2019-10-03 RX ADMIN — ENOXAPARIN SODIUM 40 MILLIGRAM(S): 100 INJECTION SUBCUTANEOUS at 13:28

## 2019-10-03 RX ADMIN — Medication 100 MILLIEQUIVALENT(S): at 21:15

## 2019-10-03 NOTE — CDI QUERY NOTE - [id number]_GEN_ALL_CORE_FT
Documentation on chart of Lactate of 3.0.    Pt. admitted with DKA    Please clarify a diagnosis based on the above clinical criteria.  A) Lactic acidosis  B) Lacticemia  C) Unknown  D) Other ( Please clarify)

## 2019-10-03 NOTE — CONSULT NOTE ADULT - SUBJECTIVE AND OBJECTIVE BOX
62 yo female with h/o endometrial CA (on keytruda) a/w DKA.      No prior personal h/o hyperglycemia or DM.  No known family h/o DM.  Was dx with endometrial CA in 12/2018 and was initially treated with chemotherapy.  Keytruda was started in 5/2019 which she had been tolerating for the most part.  ~1 week PTA started with dyspnea which progressively worsened.  By admission c/o polyuria, polydipsia, blurred vision, severe dyspnea, weakness, abdominal pain, nausea and vomiting.  On ER arrival was found to have hyperglycemia and DKA.  Has since been trated in the MICU with IVFs and insulin gtt.  AG has closed.  Is still NPO.  Currently feels overwhelmed but much improved from admission with only mild nausea which she attributed to not eating.      MEDICATIONS  (STANDING):  cefepime  Injectable. 1000 milliGRAM(s) IV Push every 12 hours  chlorhexidine 4% Liquid 1 Application(s) Topical <User Schedule>  dextrose 5% + sodium chloride 0.45% with potassium chloride 20 mEq/L 1000 milliLiter(s) (200 mL/Hr) IV Continuous <Continuous>  dextrose 50% Injectable 12.5 Gram(s) IV Push once  enoxaparin Injectable 40 milliGRAM(s) SubCutaneous daily  insulin regular Infusion 2 Unit(s)/Hr (2 mL/Hr) IV Continuous <Continuous>  insulin regular Infusion 2 Unit(s)/Hr (2 mL/Hr) IV Continuous <Continuous>  sodium chloride 0.9% with potassium chloride 20 mEq/L 1000 milliLiter(s) (250 mL/Hr) IV Continuous <Continuous>  MEDICATIONS  (PRN):  aluminum hydroxide/magnesium hydroxide/simethicone Suspension 30 milliLiter(s) Oral every 6 hours PRN Dyspepsia  PAST MEDICAL & SURGICAL HISTORY:  Chronic GERD  Anxiety  Endometrial cancer  FAMILY HISTORY: no family h/o DM   Allergies:  carboplatin (Unknown)  Compazine (Seizure)  Relafen (Rash)  SOCIAL HISTORY: lives at home   ROS: per HPI, others negative     PE:  Vital Signs Last 24 Hrs  T(C): 37.1 (03 Oct 2019 03:00), Max: 37.1 (02 Oct 2019 08:00)  T(F): 98.8 (03 Oct 2019 03:00), Max: 98.8 (03 Oct 2019 03:00)  HR: 114 (03 Oct 2019 06:00) (84 - 122)  BP: 141/68 (03 Oct 2019 06:00) (110/60 - 141/68)  BP(mean): 83 (03 Oct 2019 06:00) (67 - 106)  RR: 18 (03 Oct 2019 06:00) (17 - 24)  SpO2: 99% (03 Oct 2019 06:00) (98% - 100%)  CAPILLARY BLOOD GLUCOSE  POCT Blood Glucose.: 133 mg/dL (03 Oct 2019 06:17)  POCT Blood Glucose.: 96 mg/dL (03 Oct 2019 05:18)  POCT Blood Glucose.: 110 mg/dL (03 Oct 2019 04:13)  POCT Blood Glucose.: 179 mg/dL (03 Oct 2019 02:51)  POCT Blood Glucose.: 180 mg/dL (03 Oct 2019 01:45)  POCT Blood Glucose.: 131 mg/dL (03 Oct 2019 00:37)  POCT Blood Glucose.: 46 mg/dL (02 Oct 2019 23:29)  POCT Blood Glucose.: 98 mg/dL (02 Oct 2019 22:37)  POCT Blood Glucose.: 100 mg/dL (02 Oct 2019 21:24)  POCT Blood Glucose.: 158 mg/dL (02 Oct 2019 20:22)  POCT Blood Glucose.: 181 mg/dL (02 Oct 2019 19:11)  POCT Blood Glucose.: 117 mg/dL (02 Oct 2019 18:04)  POCT Blood Glucose.: 76 mg/dL (02 Oct 2019 17:14)  POCT Blood Glucose.: 113 mg/dL (02 Oct 2019 16:09)  POCT Blood Glucose.: 110 mg/dL (02 Oct 2019 15:15)  POCT Blood Glucose.: 146 mg/dL (02 Oct 2019 14:28)  POCT Blood Glucose.: 177 mg/dL (02 Oct 2019 13:12)  POCT Blood Glucose.: 165 mg/dL (02 Oct 2019 12:08)  POCT Blood Glucose.: 198 mg/dL (02 Oct 2019 11:12)  POCT Blood Glucose.: 236 mg/dL (02 Oct 2019 10:08)  POCT Blood Glucose.: 210 mg/dL (02 Oct 2019 09:01)  POCT Blood Glucose.: 219 mg/dL (02 Oct 2019 08:19)  POCT Blood Glucose.: 220 mg/dL (02 Oct 2019 07:19)  pleasant. NAD. AAOx3. No TM. S1+S2. CTAB. Soft. NT. NO LE edema B/L 62 yo female with h/o endometrial CA (on keytruda) a/w DKA.      No prior personal h/o hyperglycemia or DM.  No known family h/o DM.  Was dx with endometrial CA in 12/2018 and was initially treated with chemotherapy.  Keytruda was started in 5/2019 which she had been tolerating for the most part.  ~1 week PTA started with dyspnea which progressively worsened.  By admission c/o polyuria, polydipsia, blurred vision, severe dyspnea, weakness, abdominal pain, nausea and vomiting.  On ER arrival was found to have hyperglycemia and DKA.  Has since been trated in the MICU with IVFs and insulin gtt.  AG has closed.  Is still NPO.  Currently feels overwhelmed but much improved from admission with only mild nausea which she attributed to not eating.      MEDICATIONS  (STANDING):  cefepime  Injectable. 1000 milliGRAM(s) IV Push every 12 hours  chlorhexidine 4% Liquid 1 Application(s) Topical <User Schedule>  dextrose 5% + sodium chloride 0.45% with potassium chloride 20 mEq/L 1000 milliLiter(s) (200 mL/Hr) IV Continuous <Continuous>  dextrose 50% Injectable 12.5 Gram(s) IV Push once  enoxaparin Injectable 40 milliGRAM(s) SubCutaneous daily  insulin regular Infusion 2 Unit(s)/Hr (2 mL/Hr) IV Continuous <Continuous>  insulin regular Infusion 2 Unit(s)/Hr (2 mL/Hr) IV Continuous <Continuous>  sodium chloride 0.9% with potassium chloride 20 mEq/L 1000 milliLiter(s) (250 mL/Hr) IV Continuous <Continuous>  MEDICATIONS  (PRN):  aluminum hydroxide/magnesium hydroxide/simethicone Suspension 30 milliLiter(s) Oral every 6 hours PRN Dyspepsia  PAST MEDICAL & SURGICAL HISTORY:  Chronic GERD  Anxiety  Endometrial cancer  FAMILY HISTORY: no family h/o DM   Allergies:  carboplatin (Unknown)  Compazine (Seizure)  Relafen (Rash)  SOCIAL HISTORY: lives at home   ROS: per HPI, others negative     PE:  Vital Signs Last 24 Hrs  T(C): 37.1 (03 Oct 2019 03:00), Max: 37.1 (02 Oct 2019 08:00)  T(F): 98.8 (03 Oct 2019 03:00), Max: 98.8 (03 Oct 2019 03:00)  HR: 114 (03 Oct 2019 06:00) (84 - 122)  BP: 141/68 (03 Oct 2019 06:00) (110/60 - 141/68)  BP(mean): 83 (03 Oct 2019 06:00) (67 - 106)  RR: 18 (03 Oct 2019 06:00) (17 - 24)  SpO2: 99% (03 Oct 2019 06:00) (98% - 100%)  CAPILLARY BLOOD GLUCOSE  POCT Blood Glucose.: 133 mg/dL (03 Oct 2019 06:17)  POCT Blood Glucose.: 96 mg/dL (03 Oct 2019 05:18)  POCT Blood Glucose.: 110 mg/dL (03 Oct 2019 04:13)  POCT Blood Glucose.: 179 mg/dL (03 Oct 2019 02:51)  POCT Blood Glucose.: 180 mg/dL (03 Oct 2019 01:45)  POCT Blood Glucose.: 131 mg/dL (03 Oct 2019 00:37)  POCT Blood Glucose.: 46 mg/dL (02 Oct 2019 23:29)  POCT Blood Glucose.: 98 mg/dL (02 Oct 2019 22:37)  POCT Blood Glucose.: 100 mg/dL (02 Oct 2019 21:24)  POCT Blood Glucose.: 158 mg/dL (02 Oct 2019 20:22)  POCT Blood Glucose.: 181 mg/dL (02 Oct 2019 19:11)  POCT Blood Glucose.: 117 mg/dL (02 Oct 2019 18:04)  POCT Blood Glucose.: 76 mg/dL (02 Oct 2019 17:14)  POCT Blood Glucose.: 113 mg/dL (02 Oct 2019 16:09)  POCT Blood Glucose.: 110 mg/dL (02 Oct 2019 15:15)  POCT Blood Glucose.: 146 mg/dL (02 Oct 2019 14:28)  POCT Blood Glucose.: 177 mg/dL (02 Oct 2019 13:12)  POCT Blood Glucose.: 165 mg/dL (02 Oct 2019 12:08)  POCT Blood Glucose.: 198 mg/dL (02 Oct 2019 11:12)  POCT Blood Glucose.: 236 mg/dL (02 Oct 2019 10:08)  POCT Blood Glucose.: 210 mg/dL (02 Oct 2019 09:01)  POCT Blood Glucose.: 219 mg/dL (02 Oct 2019 08:19)  POCT Blood Glucose.: 220 mg/dL (02 Oct 2019 07:19)  pleasant. NAD. AAOx3. No TM. S1+S2. CTAB. Soft. NT. No LE edema B/L

## 2019-10-03 NOTE — PROGRESS NOTE ADULT - ASSESSMENT
63y old F with:  Acute DKA - Resolved  Endometrial CA - On Keytruda    Plan:  Cont Close Monitoring  Acute DKA - Secondary to Keytruda - Resolved  Start LA Insulin and s/s coverage  and DC Insulin gtt  Check serial lytes and replete   Start PO diet   DC IVF   Hold Keytruda  Monitor renal function  Strict I/O's   DVT prophylaxis - Lovenox  I have updated patient at bedside regarding her current status, hospital course, plan of care, and prognosis with all questions answered in detail.   A verbal handoff was given to Dr. Camejo. I updated her on patients current status, hospital course, plan of care, and prognosis with all questions answered in detail. She is agreeable to accept patient on her service.

## 2019-10-03 NOTE — PROGRESS NOTE ADULT - SUBJECTIVE AND OBJECTIVE BOX
63F w/ active endometrial cancer and anxiety who presented w/ SOB, epigastric pain, nausea, vomiting, polyuria, and polydipsia and subsequently admitted to ICU for DKA.      SUBJECTIVE: Pt reports being very hungry and generally weak. Indigestion controlled. No vomiting. Pt otherwise feels well. She is eager to get out of bed. Overnight, her blood sugar was 40s and insulin drip was turned off for an hour.     REVIEW OF SYSTEMS:  CONSTITUTIONAL: +mild generalized weakness, No fevers or chills  EYES/ENT: No visual changes; No vertigo or throat pain   RESPIRATORY: No cough, wheezing, hemoptysis; No shortness of breath  CARDIOVASCULAR: No chest pain or palpitations  GASTROINTESTINAL: see subjective  GENITOURINARY: No dysuria or hematuria  NEUROLOGICAL: No focal numbness or weakness  SKIN: No itching, burning, rashes, or lesions   All other review of systems is negative unless indicated above    Vital Signs Last 24 Hrs  T(C): 36.9 (03 Oct 2019 14:00), Max: 37.1 (03 Oct 2019 03:00)  T(F): 98.4 (03 Oct 2019 14:00), Max: 98.8 (03 Oct 2019 03:00)  HR: 101 (03 Oct 2019 14:00) (84 - 122)  BP: 127/71 (03 Oct 2019 14:00) (100/59 - 141/68)  BP(mean): 86 (03 Oct 2019 14:00) (67 - 106)  RR: 18 (03 Oct 2019 14:00) (16 - 24)  SpO2: 100% (03 Oct 2019 14:00) (97% - 100%)    I&O's Summary    02 Oct 2019 07:01  -  03 Oct 2019 07:00  --------------------------------------------------------  IN: 4213 mL / OUT: 1595 mL / NET: 2618 mL    03 Oct 2019 07:01  -  03 Oct 2019 15:19  --------------------------------------------------------  IN: 575 mL / OUT: 0 mL / NET: 575 mL        CAPILLARY BLOOD GLUCOSE      POCT Blood Glucose.: 105 mg/dL (03 Oct 2019 13:04)  POCT Blood Glucose.: 134 mg/dL (03 Oct 2019 12:14)  POCT Blood Glucose.: 199 mg/dL (03 Oct 2019 11:08)  POCT Blood Glucose.: 116 mg/dL (03 Oct 2019 10:07)  POCT Blood Glucose.: 145 mg/dL (03 Oct 2019 09:04)  POCT Blood Glucose.: 170 mg/dL (03 Oct 2019 08:12)  POCT Blood Glucose.: 172 mg/dL (03 Oct 2019 07:20)  POCT Blood Glucose.: 133 mg/dL (03 Oct 2019 06:17)  POCT Blood Glucose.: 96 mg/dL (03 Oct 2019 05:18)  POCT Blood Glucose.: 110 mg/dL (03 Oct 2019 04:13)  POCT Blood Glucose.: 179 mg/dL (03 Oct 2019 02:51)  POCT Blood Glucose.: 180 mg/dL (03 Oct 2019 01:45)  POCT Blood Glucose.: 131 mg/dL (03 Oct 2019 00:37)  POCT Blood Glucose.: 46 mg/dL (02 Oct 2019 23:29)  POCT Blood Glucose.: 98 mg/dL (02 Oct 2019 22:37)  POCT Blood Glucose.: 100 mg/dL (02 Oct 2019 21:24)  POCT Blood Glucose.: 158 mg/dL (02 Oct 2019 20:22)  POCT Blood Glucose.: 181 mg/dL (02 Oct 2019 19:11)  POCT Blood Glucose.: 117 mg/dL (02 Oct 2019 18:04)  POCT Blood Glucose.: 76 mg/dL (02 Oct 2019 17:14)  POCT Blood Glucose.: 113 mg/dL (02 Oct 2019 16:09)      PHYSICAL EXAM:  Constitutional: NAD, awake and alert, well-developed  Respiratory: Breath sounds are clear bilaterally, No wheezing, rales or rhonchi  Cardiovascular: S1 and S2, regular rate and rhythm, no Murmurs, gallops or rubs  Gastrointestinal: Bowel Sounds present, soft, nontender, nondistended, no guarding, no rebound  Extremities: No peripheral edema  Vascular: 2+ peripheral pulses  Neurological: Alert and answering questions appropriately  Musculoskeletal: fair strength      MEDICATIONS:  MEDICATIONS  (STANDING):  dextrose 50% Injectable 12.5 Gram(s) IV Push once  enoxaparin Injectable 40 milliGRAM(s) SubCutaneous daily  insulin glargine Injectable (LANTUS) 10 Unit(s) SubCutaneous every morning  insulin lispro (HumaLOG) corrective regimen sliding scale   SubCutaneous three times a day before meals  insulin lispro (HumaLOG) corrective regimen sliding scale   SubCutaneous at bedtime  potassium chloride  10 mEq/100 mL IVPB 10 milliEquivalent(s) IV Intermittent every 1 hour  potassium phosphate / sodium phosphate powder 2 Packet(s) Oral four times a day      LABS: All Labs Reviewed:                        11.9   9.21  )-----------( 309      ( 03 Oct 2019 06:27 )             34.0     10-03    136  |  106  |  2<L>  ----------------------------<  172<H>  3.3<L>   |  22  |  0.56    Ca    8.1<L>      03 Oct 2019 11:02  Phos  1.4     10-03  Mg     2.2     10-03    TPro  8.7<H>  /  Alb  5.1<H>  /  TBili  0.7  /  DBili  x   /  AST  5<L>  /  ALT  20  /  AlkPhos  109  10-01    PT/INR - ( 01 Oct 2019 17:21 )   PT: 10.8 sec;   INR: 0.97 ratio         PTT - ( 01 Oct 2019 17:21 )  PTT:30.6 sec  CARDIAC MARKERS ( 01 Oct 2019 17:21 )  <0.015 ng/mL / x     / x     / x     / x          Blood Culture: 10-01 @ 17:45  Organism --  Gram Stain Blood -- Gram Stain --  Specimen Source .Urine None  Culture-Blood --    10-01 @ 17:21  Organism --  Gram Stain Blood -- Gram Stain --  Specimen Source .Blood None  Culture-Blood --

## 2019-10-03 NOTE — PROGRESS NOTE ADULT - ASSESSMENT
63F w/ active endometrial cancer and anxiety who presented w/ SOB, epigastric pain, nausea, vomiting, polyuria, and polydipsia and subsequently admitted to ICU for DKA.     #DKA  - secondary to Keytruda  - AG closed, but bicarb still low  - pt alert and oriented and will tolerate PO, will transition to SQ insulin 10 units w/ ISS   - repeat BMP & phos later today   - diabetic diet    #Hypokalemia and hypophosphatemia  - continue to monitor and replete as necessary     #Endometrial cancer  - next Keytruda was planned for 10/11  - discussed w/ Dr. Greer     #DVT PPx  - lovenox SQ    #Advanced directives: full code    #Dispo: transfer to floor today. Will likely be discharged to home when medically stable    d/w Dr. Hernandez

## 2019-10-03 NOTE — CDI QUERY NOTE - NSCDIOTHERTXTBX_GEN_ALL_CORE_HH
Pt. admitted with DKA.     DOCUMENTATION:      Creatinine Trend:  Creatinine, Serum: 0.37 mg/dL <L> [0.50 - 1.30] (10-03-19)  Creatinine, Serum: 0.44 mg/dL <L> [0.50 - 1.30] (10-02-19)  Creatinine, Serum: 0.46 mg/dL <L> [0.50 - 1.30] (10-02-19)  Creatinine, Serum: 0.50 mg/dL [0.50 - 1.30] (10-02-19)  Creatinine, Serum: 0.58 mg/dL [0.50 - 1.30] (10-02-19)  Creatinine, Serum: 0.62 mg/dL [0.50 - 1.30] (10-02-19)  Creatinine, Serum: 0.73 mg/dL [0.50 - 1.30] (10-02-19)  Creatinine, Serum: 0.84 mg/dL [0.50 - 1.30] (10-01-19)  Creatinine, Serum: 1.33 mg/dL <H> [0.50 - 1.30] (10-01-19)      Please clarify a diagnosis based on the above clinical criteria.  A) KATHRYN  B) No indications of KATHRYN  C) Unknown  D) Other ( Please specify condition)

## 2019-10-03 NOTE — CONSULT NOTE ADULT - ASSESSMENT
63F with stage IV endometrial cancer (axillary,  retropectoral lymph nodes), status post systemic chemotherapy with Taxol/carbo, switched to Pembrolizumab on 5/2019 (tumor is PD L1 rich ),admitted at [facility] with diabetic ketoacidosis, thought to be related to ongoing oncologic treatment. She developed shortness of breath prior to hospitalization, associated with epigastric pain and vomiting as well as increased thirst. She was seen by endocrinology and started on insulin. Oncology consult requested in light of above.

## 2019-10-03 NOTE — CONSULT NOTE ADULT - PROBLEM SELECTOR RECOMMENDATION 9
Patient on immunotherapy ( Pembrolizumab) with secondary diabetes, likely a irreversible consequence of treatment. Case discussed with endocrinology- type I DM; insulin regimen detailed to patient. Pending diabetic teaching. Will discuss therapeutic options with endocrine in ambulatory.

## 2019-10-03 NOTE — PROGRESS NOTE ADULT - SUBJECTIVE AND OBJECTIVE BOX
CC: Wants to eat    63y old F with a PMHx of Endometrial CA, Anxiety who developed SOB over the past several days, epigastric pain, nausea, and vomiting as well as increase thirst, increased urination.  In the ED she was dx with DKA.  Of not she is on Keytruda for endometrial cancer.     10/2 - Patient seen and examined. Chart reviewed. Events noted. Remains on Insulin gtt for DKA.     10/3 - Events noted. Has remained on Insulin gtt and her AG has closed. She denies any nausea or abdominal pain. She wanst to eat. DKA attributable to Keytruda that she is on for Endometrial CA.      PAST MEDICAL & SURGICAL HISTORY:  Chronic GERD  Anxiety  Endometrial cancer      FAMILY HISTORY:      Social Hx:    Allergies    carboplatin (Unknown)  Compazine (Seizure)  Relafen (Rash)    Intolerances        63y        ICU Vital Signs Last 24 Hrs  T(C): 37.1 (03 Oct 2019 03:00), Max: 37.1 (03 Oct 2019 03:00)  T(F): 98.8 (03 Oct 2019 03:00), Max: 98.8 (03 Oct 2019 03:00)  HR: 93 (03 Oct 2019 07:00) (84 - 122)  BP: 123/81 (03 Oct 2019 07:00) (110/60 - 141/68)  BP(mean): 88 (03 Oct 2019 07:00) (67 - 106)  ABP: --  ABP(mean): --  RR: 17 (03 Oct 2019 07:00) (17 - 24)  SpO2: 100% (03 Oct 2019 07:00) (98% - 100%)          I&O's Summary    02 Oct 2019 07:01  -  03 Oct 2019 07:00  --------------------------------------------------------  IN: 4213 mL / OUT: 1595 mL / NET: 2618 mL                              11.9   9.21  )-----------( 309      ( 03 Oct 2019 06:27 )             34.0       10-03    135  |  109<H>  |  3<L>  ----------------------------<  133<H>  4.2   |  17<L>  |  0.37<L>    Ca    8.1<L>      03 Oct 2019 06:27  Phos  2.1     10  Mg     2.4     10    TPro  8.7<H>  /  Alb  5.1<H>  /  TBili  0.7  /  DBili  x   /  AST  5<L>  /  ALT  20  /  AlkPhos  109  10-      CAPILLARY BLOOD GLUCOSE      POCT Blood Glucose.: 170 mg/dL (03 Oct 2019 08:12)  POCT Blood Glucose.: 172 mg/dL (03 Oct 2019 07:20)  POCT Blood Glucose.: 133 mg/dL (03 Oct 2019 06:17)  POCT Blood Glucose.: 96 mg/dL (03 Oct 2019 05:18)  POCT Blood Glucose.: 110 mg/dL (03 Oct 2019 04:13)  POCT Blood Glucose.: 179 mg/dL (03 Oct 2019 02:51)  POCT Blood Glucose.: 180 mg/dL (03 Oct 2019 01:45)  POCT Blood Glucose.: 131 mg/dL (03 Oct 2019 00:37)  POCT Blood Glucose.: 46 mg/dL (02 Oct 2019 23:29)  POCT Blood Glucose.: 98 mg/dL (02 Oct 2019 22:37)  POCT Blood Glucose.: 100 mg/dL (02 Oct 2019 21:24)  POCT Blood Glucose.: 158 mg/dL (02 Oct 2019 20:22)  POCT Blood Glucose.: 181 mg/dL (02 Oct 2019 19:11)  POCT Blood Glucose.: 117 mg/dL (02 Oct 2019 18:04)  POCT Blood Glucose.: 76 mg/dL (02 Oct 2019 17:14)  POCT Blood Glucose.: 113 mg/dL (02 Oct 2019 16:09)  POCT Blood Glucose.: 110 mg/dL (02 Oct 2019 15:15)  POCT Blood Glucose.: 146 mg/dL (02 Oct 2019 14:28)  POCT Blood Glucose.: 177 mg/dL (02 Oct 2019 13:12)  POCT Blood Glucose.: 165 mg/dL (02 Oct 2019 12:08)  POCT Blood Glucose.: 198 mg/dL (02 Oct 2019 11:12)  POCT Blood Glucose.: 236 mg/dL (02 Oct 2019 10:08)  POCT Blood Glucose.: 210 mg/dL (02 Oct 2019 09:01)      LIVER FUNCTIONS - ( 01 Oct 2019 17:21 )  Alb: 5.1 g/dL / Pro: 8.7 gm/dL / ALK PHOS: 109 U/L / ALT: 20 U/L / AST: 5 U/L / GGT: x             CARDIAC MARKERS ( 01 Oct 2019 17:21 )  <0.015 ng/mL / x     / x     / x     / x            PT/INR - ( 01 Oct 2019 17:21 )   PT: 10.8 sec;   INR: 0.97 ratio         PTT - ( 01 Oct 2019 17:21 )  PTT:30.6 sec        Urinalysis Basic - ( 01 Oct 2019 17:45 )    Color: Yellow / Appearance: Clear / S.025 / pH: x  Gluc: x / Ketone: Large  / Bili: Negative / Urobili: Negative mg/dL   Blood: x / Protein: 100 mg/dL / Nitrite: Negative   Leuk Esterase: Trace / RBC: 3-5 /HPF / WBC 0-2   Sq Epi: x / Non Sq Epi: Moderate / Bacteria: Few        MEDICATIONS  (STANDING):  cefepime  Injectable. 1000 milliGRAM(s) IV Push every 12 hours  chlorhexidine 4% Liquid 1 Application(s) Topical <User Schedule>  dextrose 5% + sodium chloride 0.45% with potassium chloride 20 mEq/L 1000 milliLiter(s) (200 mL/Hr) IV Continuous <Continuous>  dextrose 50% Injectable 12.5 Gram(s) IV Push once  enoxaparin Injectable 40 milliGRAM(s) SubCutaneous daily  insulin glargine Injectable (LANTUS) 10 Unit(s) SubCutaneous every morning  insulin regular Infusion 2 Unit(s)/Hr (2 mL/Hr) IV Continuous <Continuous>  insulin regular Infusion 2 Unit(s)/Hr (2 mL/Hr) IV Continuous <Continuous>  sodium chloride 0.9% with potassium chloride 20 mEq/L 1000 milliLiter(s) (250 mL/Hr) IV Continuous <Continuous>    MEDICATIONS  (PRN):  aluminum hydroxide/magnesium hydroxide/simethicone Suspension 30 milliLiter(s) Oral every 6 hours PRN Dyspepsia            Advanced Directives:  Discussed with:    Visit Information:    ** Time is exclusive of billed procedures and/or teaching and/or routine family updates.

## 2019-10-03 NOTE — CONSULT NOTE ADULT - PROBLEM SELECTOR RECOMMENDATION 2
Stage IV endometrioid carcinoma, PD-L1 rich, with significant drop in  while on therapy with Pembrolizumab. At this time on hold. Consider resuming immunotherapy in ambulatory, after new endocrine treatment optimized.

## 2019-10-03 NOTE — CONSULT NOTE ADULT - SUBJECTIVE AND OBJECTIVE BOX
STACEY TIM,  63y Female  MRN: [id number]  ATTENDING: Dr.John Grant      HPI:  63F with stage IV endometrial cancer (axillary,  retropectoral lymph nodes), status post systemic chemotherapy with Taxol/carbo, switched to Pembrolizumab on 5/2019 (tumor is PD L1 rich ),admitted at [facility] with diabetic ketoacidosis, thought to be related to ongoing oncologic treatment. She developed shortness of breath prior to hospitalization, associated with epigastric pain and vomiting as well as increased thirst. She was seen by endocrinology and started on insulin. Oncology consult requested in light of above.    PAST MEDICAL & SURGICAL HISTORY:  Chronic GERD  Anxiety  Endometrial cancer    MEDICATION:  dextrose 50% Injectable 12.5 Gram(s) IV Push once  enoxaparin Injectable 40 milliGRAM(s) SubCutaneous daily  insulin glargine Injectable (LANTUS) 10 Unit(s) SubCutaneous every morning  insulin lispro (HumaLOG) corrective regimen sliding scale   SubCutaneous three times a day before meals  insulin lispro (HumaLOG) corrective regimen sliding scale   SubCutaneous at bedtime  potassium chloride    Tablet ER 40 milliEquivalent(s) Oral once  potassium chloride  10 mEq/100 mL IVPB 10 milliEquivalent(s) IV Intermittent every 1 hour  potassium phosphate / sodium phosphate powder 2 Packet(s) Oral four times a day  potassium phosphate IVPB 15 milliMole(s) IV Intermittent once      ALLERGIES:  carboplatin (Unknown)  Compazine (Seizure)  Relafen (Rash)      FAMILY HISTORY:  Reviewed, non-contributory: [ ]   Maternal-  Paternal-    SOCIAL HISTORY:  Tobacco: YES [ ]  ; NO [ ]; Former smoker [ ]  Alcohol:   YES [ ]  ; NO [ ]; Social alcohol user [ ]  Occupation/ marital status/ children:    REVIEW SYSTEMS:  Constitutional: fever, weight  HEENT: oral thrush / dysphagia  Respiratory: no dyspnea , wheezing, cough  Cardiovascular: denies chest pain, palpitations  GI: no abdominal tenderness / pain; no change in bowel habits  Musculoskeletal: joint pain / swelling  Integumentary: denies pruritus; no skin lesions  Neurologic:    VITALS:  T(C): 36.6, Max: 37.1 (10-03-19 @ 03:00)  T(F): 97.8, Max: 98.8 (10-03-19 @ 03:00)  HR: 96 (84 - 122)  BP: 121/60 (100/59 - 141/68)  SpO2: 100% (97% - 100%)    PHYSICAL EXAM:  Constitutional: Alert and oriented x 3  Eyes/ ENMT: PERRLA,   Respiratory: clear to auscultation bilaterally  Cardiovascular: S1,S2 rhythmic  Gastrointestinal: no guarding/ rebound  Extremities: no calf tenderness;  peripheral edema  Skin: dry, rash  Musculoskeletal:     LABS:  (10-03) WBC: 9.21 K/uL,Hemoglobin: 11.9 g/dL, Hematocrit: 34.0 %,  Platelet: 309 K/uL  (10-03) Na: 136 mmol/L ; K: 3.3 mmol/L ; BUN: 2 mg/dL ; Cr: 0.56 mg/dL.  PT/INR - ( 01 Oct 2019 17:21 )   PT: 10.8 sec;   INR: 0.97 ratio    PTT - ( 01 Oct 2019 17:21 )  PTT:30.6 sec    RADIOLOGY:   CT Abdomen and Pelvis w/ IV Cont (10.01.19 @ 19:46)  IMPRESSION:     Limited by extensive respiratory motion.    No pulmonary embolism in the main, right, or left pulmonary artery to the   lobar branches. Limited visualization of segmental and subsegmental   branches secondary to respiratory motion.     No acute chest pathology.    Overall improvement in disease burden in the uterus and lymph nodes   compared to 11/16/2018.    Persistent 1.2 cm nodular opacity along the right major fissure.   Continued attention on follow-up imaging. STACEY TIM,  63y Female  MRN: [id number]  ATTENDING: Dr.John Grant      HPI:  63F with stage IV endometrial cancer (axillary,  retropectoral lymph nodes), status post systemic chemotherapy with Taxol/carbo, switched to Pembrolizumab on 5/2019 (tumor is PD L1 rich ),admitted at [facility] with diabetic ketoacidosis, thought to be related to ongoing oncologic treatment. She developed shortness of breath prior to hospitalization, associated with epigastric pain and vomiting as well as increased thirst. She was seen by endocrinology and started on insulin. Oncology consult requested in light of above.    PAST MEDICAL & SURGICAL HISTORY:  Chronic GERD  Anxiety  Endometrial cancer    MEDICATION:  dextrose 50% Injectable 12.5 Gram(s) IV Push once  enoxaparin Injectable 40 milliGRAM(s) SubCutaneous daily  insulin glargine Injectable (LANTUS) 10 Unit(s) SubCutaneous every morning  insulin lispro (HumaLOG) corrective regimen sliding scale   SubCutaneous three times a day before meals  insulin lispro (HumaLOG) corrective regimen sliding scale   SubCutaneous at bedtime  potassium chloride    Tablet ER 40 milliEquivalent(s) Oral once  potassium chloride  10 mEq/100 mL IVPB 10 milliEquivalent(s) IV Intermittent every 1 hour  potassium phosphate / sodium phosphate powder 2 Packet(s) Oral four times a day  potassium phosphate IVPB 15 milliMole(s) IV Intermittent once    ALLERGIES:  carboplatin (Unknown)  Compazine (Seizure)  Relafen (Rash)      FAMILY HISTORY:  Reviewed, non-contributory: [ x ]     SOCIAL HISTORY:  Tobacco: YES [ ]  ; NO [ x ]; Former smoker [ ]  Alcohol:   YES [ ]  ; NO [ x ]; Social alcohol user [ ]  Occupation/ marital status/ children:, 2 children    REVIEW SYSTEMS:  Constitutional: no  fever, no weight loss  HEENT: no oral thrush / no dysphagia  Respiratory: + dyspnea , no wheezing, no cough  Cardiovascular: denies chest pain, palpitations  GI: no abdominal tenderness / pain; no change in bowel habits  Musculoskeletal: joint pain / swelling  Integumentary: denies pruritus; no skin lesions  Neurologic: grossly non focal    VITALS:  T(C): 37.7, Max: 37.7 (10-03-19 @ 21:21)  T(F): 99.8, Max: 99.8 (10-03-19 @ 21:21)  HR: 105 (84 - 119)  BP: 109/62 (100/59 - 141/68)  SpO2: 99% (97% - 100%)    PHYSICAL EXAM:  Constitutional: Alert and oriented x 3  Eyes/ ENMT: PERRLA,   Respiratory: clear to auscultation bilaterally  Cardiovascular: S1,S2 rhythmic  Gastrointestinal: no guarding/ rebound  Extremities: no calf tenderness;  peripheral edema  Skin: dry, rash  Musculoskeletal: normal tonus     LABS:  (10-03) WBC: 9.21 K/uL,Hemoglobin: 11.9 g/dL, Hematocrit: 34.0 %,  Platelet: 309 K/uL  (10-03) Na: 136 mmol/L ; K: 3.3 mmol/L ; BUN: 2 mg/dL ; Cr: 0.56 mg/dL.  PT/INR - ( 01 Oct 2019 17:21 )   PT: 10.8 sec;   INR: 0.97 ratio    PTT - ( 01 Oct 2019 17:21 )  PTT:30.6 sec  POCT Blood Glucose.: 163: Notified RN mg/dL (10.03.19 @ 21:39)      RADIOLOGY:   CT Abdomen and Pelvis w/ IV Cont (10.01.19 @ 19:46)  IMPRESSION:     Limited by extensive respiratory motion.    No pulmonary embolism in the main, right, or left pulmonary artery to the   lobar branches. Limited visualization of segmental and subsegmental   branches secondary to respiratory motion.     No acute chest pathology.    Overall improvement in disease burden in the uterus and lymph nodes   compared to 11/16/2018.    Persistent 1.2 cm nodular opacity along the right major fissure.   Continued attention on follow-up imaging.

## 2019-10-03 NOTE — CONSULT NOTE ADULT - ASSESSMENT
64 yo female with endometrial CA, on keytruda found to have new onset DM and DKA.    DM       --new onset DM is most likely 2/2 keytruda.  This form of DM acts like type 1 DM so will likely need basal/ bolus insulin therapy indefinitely.  Check c-peptide with next labs.        --AG has closed.  Would transition off the insulin gtt today and onto SC lantus and humalog.  Give lantus 2 hours prior to stopping the insulin gtt.        --DM education was started today and she will need extensive education prior to discharge: nutrition, glucometer use, insulin use, etc.        --start DM diet once eating        --would get formal nutrition evaluation        --titrate SC insulin per fingerstick BS's        --replete electrolytes as needed        --stressed the importance of her having outpatient endocrine F/U to provide ongoing care 62 yo female with endometrial CA, on keytruda found to have new onset DM and DKA.    DM       --new onset DM is most likely 2/2 keytruda.  This form of DM acts like type 1 DM so will likely need basal/ bolus insulin therapy indefinitely.  Check c-peptide with next labs.        --AG has closed and BS's have improved.  Would transition off the insulin gtt today and onto SC lantus and humalog.  Give lantus 2 hours prior to stopping the insulin gtt.        --DM education was started today and she will need extensive education prior to discharge: nutrition, glucometer use, insulin use, etc.        --start DM diet once eating        --would get formal nutrition evaluation        --titrate SC insulin per fingerstick BS's        --replete electrolytes as needed        --stressed the importance of her having outpatient endocrine F/U to provide ongoing care

## 2019-10-04 ENCOUNTER — TRANSCRIPTION ENCOUNTER (OUTPATIENT)
Age: 64
End: 2019-10-04

## 2019-10-04 LAB
HCT VFR BLD CALC: 35.8 % — SIGNIFICANT CHANGE UP (ref 34.5–45)
HGB BLD-MCNC: 12.9 G/DL — SIGNIFICANT CHANGE UP (ref 11.5–15.5)
MAGNESIUM SERPL-MCNC: 2.2 MG/DL — SIGNIFICANT CHANGE UP (ref 1.6–2.6)
MCHC RBC-ENTMCNC: 32.5 PG — SIGNIFICANT CHANGE UP (ref 27–34)
MCHC RBC-ENTMCNC: 36 GM/DL — SIGNIFICANT CHANGE UP (ref 32–36)
MCV RBC AUTO: 90.2 FL — SIGNIFICANT CHANGE UP (ref 80–100)
PHOSPHATE SERPL-MCNC: 3.2 MG/DL — SIGNIFICANT CHANGE UP (ref 2.5–4.5)
PLATELET # BLD AUTO: 351 K/UL — SIGNIFICANT CHANGE UP (ref 150–400)
RBC # BLD: 3.97 M/UL — SIGNIFICANT CHANGE UP (ref 3.8–5.2)
RBC # FLD: 13.2 % — SIGNIFICANT CHANGE UP (ref 10.3–14.5)
WBC # BLD: 6.36 K/UL — SIGNIFICANT CHANGE UP (ref 3.8–10.5)
WBC # FLD AUTO: 6.36 K/UL — SIGNIFICANT CHANGE UP (ref 3.8–10.5)

## 2019-10-04 PROCEDURE — 99232 SBSQ HOSP IP/OBS MODERATE 35: CPT

## 2019-10-04 RX ADMIN — Medication 2 PACKET(S): at 00:19

## 2019-10-04 RX ADMIN — Medication 2: at 11:55

## 2019-10-04 RX ADMIN — Medication 2: at 16:53

## 2019-10-04 RX ADMIN — INSULIN GLARGINE 10 UNIT(S): 100 INJECTION, SOLUTION SUBCUTANEOUS at 07:57

## 2019-10-04 RX ADMIN — Medication 2 PACKET(S): at 05:53

## 2019-10-04 RX ADMIN — Medication 6: at 07:57

## 2019-10-04 RX ADMIN — ENOXAPARIN SODIUM 40 MILLIGRAM(S): 100 INJECTION SUBCUTANEOUS at 11:56

## 2019-10-04 NOTE — DISCHARGE NOTE NURSING/CASE MANAGEMENT/SOCIAL WORK - NSSCTYPOFSERV_GEN_ALL_CORE
RN evaluation, diabetic teaching, new on insulin  NOTE: Patient to call insurance company to see if they offer complex case management services for Diabetes and/or cancer, names of in network endocrinologist for f/u and where to get diabetic supplies from their network providers.

## 2019-10-04 NOTE — PROGRESS NOTE ADULT - SUBJECTIVE AND OBJECTIVE BOX
INTERVAL HPI/OVERNIGHT EVENTS:  63F w/ active endometrial cancer and anxiety who presented w/ SOB, epigastric pain, nausea, vomiting, polyuria, and polydipsia and subsequently admitted to ICU for DKA.      10/3/19: Pt reports being very hungry and generally weak. Indigestion controlled. No vomiting. Pt otherwise feels well. She is eager to get out of bed. Overnight, her blood sugar was 40s and insulin drip was turned off for an hour.   10/4/19- Patient seen and examined at bedside. States she feels well. States she feels overwhelmed with the diabetes diagnosis, FS stable. Education and emotional support provided.    MEDICATIONS  (STANDING):  dextrose 50% Injectable 12.5 Gram(s) IV Push once  enoxaparin Injectable 40 milliGRAM(s) SubCutaneous daily  insulin glargine Injectable (LANTUS) 10 Unit(s) SubCutaneous every morning  insulin lispro (HumaLOG) corrective regimen sliding scale   SubCutaneous three times a day before meals  insulin lispro (HumaLOG) corrective regimen sliding scale   SubCutaneous at bedtime  potassium phosphate / sodium phosphate powder 2 Packet(s) Oral four times a day    MEDICATIONS  (PRN):  aluminum hydroxide/magnesium hydroxide/simethicone Suspension 30 milliLiter(s) Oral every 6 hours PRN Dyspepsia      Allergies    carboplatin (Unknown)  Compazine (Seizure)  Relafen (Rash)    Intolerances      ROS:  CONSTITUTIONAL: No weakness, fevers or chills  EYES/ENT: No visual changes;  No vertigo or throat pain   NECK: No pain or stiffness  RESPIRATORY: No cough, wheezing, hemoptysis; No shortness of breath  CARDIOVASCULAR: No chest pain or palpitations  GASTROINTESTINAL: No abdominal or epigastric pain. No nausea, vomiting, or hematemesis  GENITOURINARY: No dysuria, frequency or hematuria  NEUROLOGICAL: No numbness or weakness  SKIN: No itching, burning, rashes, or lesions   All other review of systems is negative unless indicated above.    Vital Signs Last 24 Hrs  T(C): 36.8 (04 Oct 2019 11:19), Max: 37.7 (03 Oct 2019 21:21)  T(F): 98.2 (04 Oct 2019 11:19), Max: 99.8 (03 Oct 2019 21:21)  HR: 99 (04 Oct 2019 11:19) (89 - 105)  BP: 138/77 (04 Oct 2019 11:19) (109/62 - 138/77)  BP(mean): --  RR: 18 (04 Oct 2019 11:19) (17 - 18)  SpO2: 99% (04 Oct 2019 11:19) (99% - 100%)    10-03 @ 07:01  -  10-04 @ 07:00  --------------------------------------------------------  IN: 575 mL / OUT: 0 mL / NET: 575 mL    10-04 @ 07:01  -  10-04 @ 18:13  --------------------------------------------------------  IN: 340 mL / OUT: 0 mL / NET: 340 mL      Physical Exam:  General: WN/WD NAD  Neurology: A&Ox3, nonfocal, BOYD x 4  Respiratory: CTA B/L  CV: RRR, S1S2, no murmurs, rubs or gallops  Abdominal: Soft, NT, ND +BS  Extremities: No edema, + peripheral pulses      LABS:                        12.9   6.36  )-----------( 351      ( 04 Oct 2019 07:57 )             35.8     10-03    137  |  107  |  3<L>  ----------------------------<  209<H>  4.3   |  21<L>  |  0.52    Ca    8.1<L>      03 Oct 2019 18:48  Phos  3.2     10-04  Mg     2.2     10-04            RADIOLOGY & ADDITIONAL TESTS:

## 2019-10-04 NOTE — DISCHARGE NOTE NURSING/CASE MANAGEMENT/SOCIAL WORK - PATIENT PORTAL LINK FT
You can access the FollowMyHealth Patient Portal offered by [facility] by registering at the following website: [email or web address]. By joining [facility]’s FollowMyHealth portal, you will also be able to view your health information using other applications (apps) compatible with our system.

## 2019-10-04 NOTE — PROGRESS NOTE ADULT - PROBLEM SELECTOR PLAN 2
Wendie Pembrolizumab induced type I diabetes. Had a lengthy conversation with Dr. Job Mcdaniel, who will not be able to follow patient in ambulatory, but will arrange for her discharge insulin schedule. It is my experience that the immunosuppression of pancreatic function is irreversible, and that the focus should now remain on continuing cancer treatment.

## 2019-10-04 NOTE — PROGRESS NOTE ADULT - PROBLEM SELECTOR PLAN 1
Patient due to resume Pembolizumab in ambulatory next week. Patient has a PD-L1 rich tumor, which so far seems to respond to treatment. Unfortunately, it is likely that she is experiencing side effects from this class of medication, and will require insulin from now on. She is committed to continue her cancer therapy.

## 2019-10-04 NOTE — PROGRESS NOTE ADULT - ASSESSMENT
63F with stage IV endometrial cancer (axillary,  retropectoral lymph nodes), status post systemic chemotherapy with Taxol/carbo, switched to Pembrolizumab on 5/2019 (tumor is PD L1 rich ),admitted at [facility] with diabetic ketoacidosis, thought to be related to ongoing oncologic treatment. She developed shortness of breath prior to hospitalization, associated with epigastric pain and vomiting as well as increased thirst. She was seen by endocrinology and started on insulin. Oncology consult requested in light of above.

## 2019-10-04 NOTE — PROGRESS NOTE ADULT - ASSESSMENT
62 yo female with endometrial CA, on keytruda found to have new onset DM and DKA.    DM       --new onset DM is most likely 2/2 keytruda.  This form of DM acts like type 1 DM so will likely need basal/ bolus insulin therapy indefinitely.  Low c-peptide in the setting of hyperglycemia confirms insulin deficiency.        --DKA has resolved        --BS's have improved though still a little high        --continue insulin doses the same for now & further titrate as needed in ensuing days        --further DM education given today        --DM diet        --titrate SC insulin per fingerstick BS's        --stressed the importance of her having outpatient endocrine F/U to provide ongoing care       --upon discharge, she will need Rx's for lantus insulin pens, humalog insulin pens, insulin pen needles, glucometer, test strips and lancets

## 2019-10-04 NOTE — PROGRESS NOTE ADULT - SUBJECTIVE AND OBJECTIVE BOX
STACEY TIM, 63y Female  MRN: [id number]  ATTENDING: Job Grant    HPI:  63F with stage IV endometrial cancer (axillary,  retropectoral lymph nodes), status post systemic chemotherapy with Taxol/carbo, switched to Pembrolizumab on 5/2019 (tumor is PD L1 rich ),admitted at [facility] with diabetic ketoacidosis, thought to be related to ongoing oncologic treatment. She developed shortness of breath prior to hospitalization, associated with epigastric pain and vomiting as well as increased thirst. She was seen by endocrinology and started on insulin. Oncology consult requested in light of above.    MEDICATIONS:  aluminum hydroxide/magnesium hydroxide/simethicone Suspension 30 milliLiter(s) Oral every 6 hours PRN  dextrose 50% Injectable 12.5 Gram(s) IV Push once  enoxaparin Injectable 40 milliGRAM(s) SubCutaneous daily  insulin glargine Injectable (LANTUS) 10 Unit(s) SubCutaneous every morning  insulin lispro (HumaLOG) corrective regimen sliding scale   SubCutaneous three times a day before meals  insulin lispro (HumaLOG) corrective regimen sliding scale   SubCutaneous at bedtime  potassium phosphate / sodium phosphate powder 2 Packet(s) Oral four times a day    All other medications reviewed.    SUBJECTIVE:    VITALS:  T(C): 37 (10-04-19 @ 05:07), Max: 37.7 (10-03-19 @ 21:21)  T(F): 98.6 (10-04-19 @ 05:07), Max: 99.8 (10-03-19 @ 21:21)  HR: 89 (10-04-19 @ 05:07) (89 - 115)  BP: 118/52 (10-04-19 @ 05:07) (100/59 - 127/71)      PHYSICAL EXAM:  Respiratory: bilateral clear to auscultation  Cardiovascular : S1, S2 rhythmic  Abdomen: soft, distended, + BS  Extremities: no tenderness;  -c/c/e    LABS:  (10-03) WBC: 9.21 K/uL,Hemoglobin: 11.9 g/dL, Hematocrit: 34.0 %,    Platelet: 309 K/uL    (10-03) Na: 137 mmol/L ; K: 4.3 mmol/L ; BUN: 3 mg/dL ; Cr: 0.52 mg/dL.        RADIOLOGY: STACEY TIM, 63y Female  MRN: [id number]  ATTENDING: Job Grant    HPI:  63F with stage IV endometrial cancer (axillary,  retropectoral lymph nodes), status post systemic chemotherapy with Taxol/carbo, switched to Pembrolizumab on 5/2019 (tumor is PD L1 rich ),admitted at [facility] with diabetic ketoacidosis, thought to be related to ongoing oncologic treatment. She developed shortness of breath prior to hospitalization, associated with epigastric pain and vomiting as well as increased thirst. She was seen by endocrinology ( Dr. Job Mckinley), and started on insulin. Oncology consult requested in light of above.    MEDICATIONS:  aluminum hydroxide/magnesium hydroxide/simethicone Suspension 30 milliLiter(s) Oral every 6 hours PRN  dextrose 50% Injectable 12.5 Gram(s) IV Push once  enoxaparin Injectable 40 milliGRAM(s) SubCutaneous daily  insulin glargine Injectable (LANTUS) 10 Unit(s) SubCutaneous every morning  insulin lispro (HumaLOG) corrective regimen sliding scale   SubCutaneous three times a day before meals  insulin lispro (HumaLOG) corrective regimen sliding scale   SubCutaneous at bedtime  potassium phosphate / sodium phosphate powder 2 Packet(s) Oral four times a day    All other medications reviewed.    SUBJECTIVE:  Feeling better; taking po potassium, and compliant with insulin. Appears non-toxic and in good spirits.  Wants to resume treatment with Pembrolizumab in ambulatory.    VITALS:  T(C): 37 (10-04-19 @ 05:07), Max: 37.7 (10-03-19 @ 21:21)  T(F): 98.6 (10-04-19 @ 05:07), Max: 99.8 (10-03-19 @ 21:21)  HR: 89 (10-04-19 @ 05:07) (89 - 115)  BP: 118/52 (10-04-19 @ 05:07) (100/59 - 127/71)    PHYSICAL EXAM:  Respiratory: bilateral clear to auscultation  Cardiovascular : S1, S2 rhythmic  Abdomen: soft, distended, + BS  Extremities: no tenderness;  -c/c/e    LABS:  (10-03) WBC: 9.21 K/uL,Hemoglobin: 11.9 g/dL, Hematocrit: 34.0 %,  Platelet: 309 K/uL  (10-03) Na: 137 mmol/L ; K: 4.3 mmol/L ; BUN: 3 mg/dL ; Cr: 0.52 mg/dL.    RADIOLOGY:  CT Abdomen and Pelvis w/ IV Cont (10.01.19 @ 19:46)  IMPRESSION:     Limited by extensive respiratory motion.    No pulmonary embolism in the main, right, or left pulmonary artery to the   lobar branches. Limited visualization of segmental and subsegmental   branches secondary to respiratory motion.     No acute chest pathology.    Overall improvement in disease burden in the uterus and lymph nodes   compared to 11/16/2018.    Persistent 1.2 cm nodular opacity along the right major fissure.   Continued attention on follow-up imaging.

## 2019-10-04 NOTE — PROGRESS NOTE ADULT - SUBJECTIVE AND OBJECTIVE BOX
Pt now on 5S.  Taking PO though going slow with diet.  Denies abdominal pain or vomiting.  Was seen by nutrition.  States she may be discharged in 2 days.     MEDICATIONS  (STANDING):  dextrose 50% Injectable 12.5 Gram(s) IV Push once  enoxaparin Injectable 40 milliGRAM(s) SubCutaneous daily  insulin glargine Injectable (LANTUS) 10 Unit(s) SubCutaneous every morning  insulin lispro (HumaLOG) corrective regimen sliding scale   SubCutaneous three times a day before meals  insulin lispro (HumaLOG) corrective regimen sliding scale   SubCutaneous at bedtime  potassium phosphate / sodium phosphate powder 2 Packet(s) Oral four times a day  MEDICATIONS  (PRN):  aluminum hydroxide/magnesium hydroxide/simethicone Suspension 30 milliLiter(s) Oral every 6 hours PRN Dyspepsia  ROS: per HPI. Others negative     PE:  Vital Signs Last 24 Hrs  T(C): 36.8 (04 Oct 2019 11:19), Max: 37.7 (03 Oct 2019 21:21)  T(F): 98.2 (04 Oct 2019 11:19), Max: 99.8 (03 Oct 2019 21:21)  HR: 99 (04 Oct 2019 11:19) (89 - 105)  BP: 138/77 (04 Oct 2019 11:19) (109/62 - 138/77)  BP(mean): --  RR: 18 (04 Oct 2019 11:19) (17 - 18)  SpO2: 99% (04 Oct 2019 11:19) (99% - 100%)  CAPILLARY BLOOD GLUCOSE  POCT Blood Glucose.: 179 mg/dL (04 Oct 2019 16:50)  POCT Blood Glucose.: 177 mg/dL (04 Oct 2019 11:51)  POCT Blood Glucose.: 276 mg/dL (04 Oct 2019 07:47)  POCT Blood Glucose.: 163 mg/dL (03 Oct 2019 21:39)  pleasant.  NAD.  AAOx3. No TM. S1+S2. CTAB.  Soft. NT. No LE edema B/L

## 2019-10-05 LAB
ANION GAP SERPL CALC-SCNC: 7 MMOL/L — SIGNIFICANT CHANGE UP (ref 5–17)
BUN SERPL-MCNC: 8 MG/DL — SIGNIFICANT CHANGE UP (ref 7–23)
CALCIUM SERPL-MCNC: 8.7 MG/DL — SIGNIFICANT CHANGE UP (ref 8.5–10.1)
CHLORIDE SERPL-SCNC: 103 MMOL/L — SIGNIFICANT CHANGE UP (ref 96–108)
CO2 SERPL-SCNC: 28 MMOL/L — SIGNIFICANT CHANGE UP (ref 22–31)
CREAT SERPL-MCNC: 0.61 MG/DL — SIGNIFICANT CHANGE UP (ref 0.5–1.3)
GLUCOSE SERPL-MCNC: 200 MG/DL — HIGH (ref 70–99)
HCT VFR BLD CALC: 35.4 % — SIGNIFICANT CHANGE UP (ref 34.5–45)
HGB BLD-MCNC: 12.1 G/DL — SIGNIFICANT CHANGE UP (ref 11.5–15.5)
MAGNESIUM SERPL-MCNC: 2.1 MG/DL — SIGNIFICANT CHANGE UP (ref 1.6–2.6)
MCHC RBC-ENTMCNC: 32 PG — SIGNIFICANT CHANGE UP (ref 27–34)
MCHC RBC-ENTMCNC: 34.2 GM/DL — SIGNIFICANT CHANGE UP (ref 32–36)
MCV RBC AUTO: 93.7 FL — SIGNIFICANT CHANGE UP (ref 80–100)
PHOSPHATE SERPL-MCNC: 2.8 MG/DL — SIGNIFICANT CHANGE UP (ref 2.5–4.5)
PLATELET # BLD AUTO: 323 K/UL — SIGNIFICANT CHANGE UP (ref 150–400)
POTASSIUM SERPL-MCNC: 3.8 MMOL/L — SIGNIFICANT CHANGE UP (ref 3.5–5.3)
POTASSIUM SERPL-SCNC: 3.8 MMOL/L — SIGNIFICANT CHANGE UP (ref 3.5–5.3)
RBC # BLD: 3.78 M/UL — LOW (ref 3.8–5.2)
RBC # FLD: 13.1 % — SIGNIFICANT CHANGE UP (ref 10.3–14.5)
SODIUM SERPL-SCNC: 138 MMOL/L — SIGNIFICANT CHANGE UP (ref 135–145)
WBC # BLD: 4.73 K/UL — SIGNIFICANT CHANGE UP (ref 3.8–10.5)
WBC # FLD AUTO: 4.73 K/UL — SIGNIFICANT CHANGE UP (ref 3.8–10.5)

## 2019-10-05 RX ORDER — LANOLIN ALCOHOL/MO/W.PET/CERES
3 CREAM (GRAM) TOPICAL ONCE
Refills: 0 | Status: COMPLETED | OUTPATIENT
Start: 2019-10-05 | End: 2019-10-05

## 2019-10-05 RX ORDER — INSULIN GLARGINE 100 [IU]/ML
14 INJECTION, SOLUTION SUBCUTANEOUS EVERY MORNING
Refills: 0 | Status: DISCONTINUED | OUTPATIENT
Start: 2019-10-06 | End: 2019-10-07

## 2019-10-05 RX ORDER — INSULIN GLARGINE 100 [IU]/ML
4 INJECTION, SOLUTION SUBCUTANEOUS ONCE
Refills: 0 | Status: COMPLETED | OUTPATIENT
Start: 2019-10-05 | End: 2019-10-05

## 2019-10-05 RX ADMIN — Medication 2: at 17:16

## 2019-10-05 RX ADMIN — Medication 2: at 12:16

## 2019-10-05 RX ADMIN — INSULIN GLARGINE 4 UNIT(S): 100 INJECTION, SOLUTION SUBCUTANEOUS at 14:17

## 2019-10-05 RX ADMIN — INSULIN GLARGINE 10 UNIT(S): 100 INJECTION, SOLUTION SUBCUTANEOUS at 08:31

## 2019-10-05 RX ADMIN — Medication 2: at 07:22

## 2019-10-05 RX ADMIN — Medication 3 MILLIGRAM(S): at 21:53

## 2019-10-05 RX ADMIN — ENOXAPARIN SODIUM 40 MILLIGRAM(S): 100 INJECTION SUBCUTANEOUS at 12:18

## 2019-10-05 NOTE — PROGRESS NOTE ADULT - SUBJECTIVE AND OBJECTIVE BOX
Tolerating PO without nausea or vomiting.      MEDICATIONS  (STANDING):  dextrose 50% Injectable 12.5 Gram(s) IV Push once  enoxaparin Injectable 40 milliGRAM(s) SubCutaneous daily  insulin glargine Injectable (LANTUS) 4 Unit(s) SubCutaneous once  insulin lispro (HumaLOG) corrective regimen sliding scale   SubCutaneous three times a day before meals  insulin lispro (HumaLOG) corrective regimen sliding scale   SubCutaneous at bedtime  MEDICATIONS  (PRN):  aluminum hydroxide/magnesium hydroxide/simethicone Suspension 30 milliLiter(s) Oral every 6 hours PRN Dyspepsia  ROS: per HPI, others negative     PE:  Vital Signs Last 24 Hrs  T(C): 36.9 (05 Oct 2019 11:07), Max: 36.9 (05 Oct 2019 11:07)  T(F): 98.5 (05 Oct 2019 11:07), Max: 98.5 (05 Oct 2019 11:07)  HR: 88 (05 Oct 2019 11:07) (78 - 99)  BP: 129/76 (05 Oct 2019 11:07) (113/56 - 129/76)  BP(mean): --  RR: 17 (05 Oct 2019 11:07) (17 - 18)  SpO2: 100% (05 Oct 2019 11:07) (99% - 100%)  CAPILLARY BLOOD GLUCOSE  POCT Blood Glucose.: 197 mg/dL (05 Oct 2019 07:07)  POCT Blood Glucose.: 243 mg/dL (04 Oct 2019 21:53)  POCT Blood Glucose.: 179 mg/dL (04 Oct 2019 16:50)  pleasant. NAD. AAOx3. No TM. S1+S2. CTAB. Soft. NT. No LE edema B/L

## 2019-10-05 NOTE — PROGRESS NOTE ADULT - SUBJECTIVE AND OBJECTIVE BOX
INTERVAL HPI/OVERNIGHT EVENTS:  63F w/ active endometrial cancer and anxiety who presented w/ SOB, epigastric pain, nausea, vomiting, polyuria, and polydipsia and subsequently admitted to ICU for DKA.      10/3/19: Pt reports being very hungry and generally weak. Indigestion controlled. No vomiting. Pt otherwise feels well. She is eager to get out of bed. Overnight, her blood sugar was 40s and insulin drip was turned off for an hour.   10/4/19- Patient seen and examined at bedside. States she feels well. States she feels overwhelmed with the diabetes diagnosis, FS stable. Education and emotional support provided.  10.5: no cp, no sob, no n/v/d            REVIEW OF SYSTEMS:    CONSTITUTIONAL: No weakness, No fevers or chills  ENT: No ear ache, No sorethroat  NECK: No pain, No stiffness  RESPIRATORY: No cough, No wheezing, No hemoptysis; No dyspnea  CARDIOVASCULAR: No chest pain, No palpitations  GASTROINTESTINAL: No abd pain, No nausea, No vomiting, No hematemesis, No diarrhea or constipation. No melena, No hematochezia.  GENITOURINARY: No dysuria, No  hematuria  NEUROLOGICAL: No diplopia, No paresthesia, No motor dysfunction  MUSCULOSKELETAL: No arthralgia, No myalgia  SKIN: No rashes, or lesions   PSYCH: no anxiety, no suicidal ideation    All other review of systems is negative unless indicated above    Vital Signs Last 24 Hrs  T(C): 36.9 (05 Oct 2019 11:07), Max: 36.9 (05 Oct 2019 11:07)  T(F): 98.5 (05 Oct 2019 11:07), Max: 98.5 (05 Oct 2019 11:07)  HR: 88 (05 Oct 2019 11:07) (78 - 99)  BP: 129/76 (05 Oct 2019 11:07) (113/56 - 129/76)  BP(mean): --  RR: 17 (05 Oct 2019 11:07) (17 - 18)  SpO2: 100% (05 Oct 2019 11:07) (99% - 100%)    PHYSICAL EXAM:    GENERAL: NAD, Well nourished  HEENT:  NC/AT, EOMI, PERRLA, No scleral icterus, Moist mucous membranes  NECK: Supple, No JVD  CNS:  Alert & Oriented X3, Motor Strength 5/5 B/L upper and lower extremities; DTRs 2+ intact   LUNG: Normal Breath sounds, Clear to auscultation bilaterally, No rales, No rhonchi, No wheezing  HEART: RRR; No murmurs, No rubs  ABDOMEN: +BS, ST/ND/NT  GENITOURINARY: Voiding, Bladder not distended  EXTREMITIES:  2+ Peripheral Pulses, No clubbing, No cyanosis, No tibial edema  MUSCULOSKELTAL: Joints normal ROM, No TTP, No effusion  VAGINAL: deferred  SKIN: no rashes  RECTAL: deferred, not indicated  BREAST: deferred                          12.1   4.73  )-----------( 323      ( 05 Oct 2019 06:59 )             35.4     10-05    138  |  103  |  8   ----------------------------<  200<H>  3.8   |  28  |  0.61    Ca    8.7      05 Oct 2019 06:59  Phos  2.8     10-05  Mg     2.1     10-05      Vancomycin levels:   Cultures:     MEDICATIONS  (STANDING):  dextrose 50% Injectable 12.5 Gram(s) IV Push once  enoxaparin Injectable 40 milliGRAM(s) SubCutaneous daily  insulin lispro (HumaLOG) corrective regimen sliding scale   SubCutaneous three times a day before meals  insulin lispro (HumaLOG) corrective regimen sliding scale   SubCutaneous at bedtime    MEDICATIONS  (PRN):  aluminum hydroxide/magnesium hydroxide/simethicone Suspension 30 milliLiter(s) Oral every 6 hours PRN Dyspepsia      all labs reviewed  all imaging reviewed    a/p:  63F w/ active endometrial cancer and anxiety who presented w/ SOB, epigastric pain, nausea, vomiting, polyuria, and polydipsia and subsequently admitted to ICU for DKA.     1. DKA on admission  - secondary to Keytruda  - AG closed  - pt alert and oriented and will tolerate PO, will transition to SQ insulin 10 units w/ ISS  - diabetic diet, diabetic education    2. KATHRYN  - likely 2/2 DKA  - now resolved    3. Hypokalemia and hypophosphatemia  - continue to monitor and replete as necessary     4. Endometrial cancer  - next Keytruda was planned for 10/11  - discussed w/ Dr. Greer     5. DVT PPx  - lovenox SQ    #Advanced directives: full code    #Dispo: D/c home in next 24-48 hours after extensive diabetic education, will need supplies

## 2019-10-06 LAB
CULTURE RESULTS: SIGNIFICANT CHANGE UP
CULTURE RESULTS: SIGNIFICANT CHANGE UP
SPECIMEN SOURCE: SIGNIFICANT CHANGE UP
SPECIMEN SOURCE: SIGNIFICANT CHANGE UP

## 2019-10-06 RX ORDER — ALPRAZOLAM 0.25 MG
0.25 TABLET ORAL ONCE
Refills: 0 | Status: DISCONTINUED | OUTPATIENT
Start: 2019-10-06 | End: 2019-10-06

## 2019-10-06 RX ADMIN — Medication 0.25 MILLIGRAM(S): at 23:20

## 2019-10-06 RX ADMIN — Medication 4: at 16:53

## 2019-10-06 RX ADMIN — Medication 2: at 07:57

## 2019-10-06 RX ADMIN — INSULIN GLARGINE 14 UNIT(S): 100 INJECTION, SOLUTION SUBCUTANEOUS at 07:58

## 2019-10-06 RX ADMIN — ENOXAPARIN SODIUM 40 MILLIGRAM(S): 100 INJECTION SUBCUTANEOUS at 13:28

## 2019-10-06 RX ADMIN — Medication 2: at 13:29

## 2019-10-06 NOTE — PROGRESS NOTE ADULT - SUBJECTIVE AND OBJECTIVE BOX
INTERVAL HPI/OVERNIGHT EVENTS:  63F w/ active endometrial cancer and anxiety who presented w/ SOB, epigastric pain, nausea, vomiting, polyuria, and polydipsia and subsequently admitted to ICU for DKA.      10/3/19: Pt reports being very hungry and generally weak. Indigestion controlled. No vomiting. Pt otherwise feels well. She is eager to get out of bed. Overnight, her blood sugar was 40s and insulin drip was turned off for an hour.   10/4/19- Patient seen and examined at bedside. States she feels well. States she feels overwhelmed with the diabetes diagnosis, FS stable. Education and emotional support provided.  10.5: no cp, no sob, no n/v/d  10.6: no distress; states again she feels overwhelmed with the insulin/diabetes instructions             REVIEW OF SYSTEMS:    CONSTITUTIONAL: No weakness, No fevers or chills  ENT: No ear ache, No sorethroat  NECK: No pain, No stiffness  RESPIRATORY: No cough, No wheezing, No hemoptysis; No dyspnea  CARDIOVASCULAR: No chest pain, No palpitations  GASTROINTESTINAL: No abd pain, No nausea, No vomiting, No hematemesis, No diarrhea or constipation. No melena, No hematochezia.  GENITOURINARY: No dysuria, No  hematuria  NEUROLOGICAL: No diplopia, No paresthesia, No motor dysfunction  MUSCULOSKELETAL: No arthralgia, No myalgia  SKIN: No rashes, or lesions   PSYCH: no anxiety, no suicidal ideation    All other review of systems is negative unless indicated above    Vital Signs Last 24 Hrs  T(C): 36.9 (05 Oct 2019 11:07), Max: 36.9 (05 Oct 2019 11:07)  T(F): 98.5 (05 Oct 2019 11:07), Max: 98.5 (05 Oct 2019 11:07)  HR: 88 (05 Oct 2019 11:07) (78 - 99)  BP: 129/76 (05 Oct 2019 11:07) (113/56 - 129/76)  BP(mean): --  RR: 17 (05 Oct 2019 11:07) (17 - 18)  SpO2: 100% (05 Oct 2019 11:07) (99% - 100%)    PHYSICAL EXAM:    GENERAL: NAD, Well nourished  HEENT:  NC/AT, EOMI, PERRLA, No scleral icterus, Moist mucous membranes  NECK: Supple, No JVD  CNS:  Alert & Oriented X3, Motor Strength 5/5 B/L upper and lower extremities; DTRs 2+ intact   LUNG: Normal Breath sounds, Clear to auscultation bilaterally, No rales, No rhonchi, No wheezing  HEART: RRR; No murmurs, No rubs  ABDOMEN: +BS, ST/ND/NT  GENITOURINARY: Voiding, Bladder not distended  EXTREMITIES:  2+ Peripheral Pulses, No clubbing, No cyanosis, No tibial edema  MUSCULOSKELTAL: Joints normal ROM, No TTP, No effusion  VAGINAL: deferred  SKIN: no rashes  RECTAL: deferred, not indicated  BREAST: deferred                          12.1   4.73  )-----------( 323      ( 05 Oct 2019 06:59 )             35.4     10-05    138  |  103  |  8   ----------------------------<  200<H>  3.8   |  28  |  0.61    Ca    8.7      05 Oct 2019 06:59  Phos  2.8     10-05  Mg     2.1     10-05      Vancomycin levels:   Cultures:     MEDICATIONS  (STANDING):  dextrose 50% Injectable 12.5 Gram(s) IV Push once  enoxaparin Injectable 40 milliGRAM(s) SubCutaneous daily  insulin glargine Injectable (LANTUS) 14 Unit(s) SubCutaneous every morning  insulin lispro (HumaLOG) corrective regimen sliding scale   SubCutaneous three times a day before meals  insulin lispro (HumaLOG) corrective regimen sliding scale   SubCutaneous at bedtime    MEDICATIONS  (PRN):  aluminum hydroxide/magnesium hydroxide/simethicone Suspension 30 milliLiter(s) Oral every 6 hours PRN Dyspepsia      all labs reviewed  all imaging reviewed    a/p:  63F w/ active endometrial cancer and anxiety who presented w/ SOB, epigastric pain, nausea, vomiting, polyuria, and polydipsia and subsequently admitted to ICU for DKA.     1. DKA on admission  - secondary to Keytruda  - AG closed  Insulin Lantus 14u QHS, Novolog SS  - diabetic diet, diabetic education done extensively     2. KATHRYN  - likely 2/2 DKA  - now resolved    3. Hypokalemia and hypophosphatemia  - continue to monitor and replete as necessary     4. Endometrial cancer  - next Keytruda was planned for 10/11  - discussed w/ Dr. Greer     5. DVT PPx  - lovenox SQ    #Advanced directives: full code    #Dispo: D/c home in next 24-48 hours after extensive diabetic education, will need supplies

## 2019-10-07 ENCOUNTER — TRANSCRIPTION ENCOUNTER (OUTPATIENT)
Age: 64
End: 2019-10-07

## 2019-10-07 VITALS
SYSTOLIC BLOOD PRESSURE: 135 MMHG | RESPIRATION RATE: 16 BRPM | DIASTOLIC BLOOD PRESSURE: 66 MMHG | OXYGEN SATURATION: 100 % | TEMPERATURE: 98 F | HEART RATE: 95 BPM

## 2019-10-07 PROCEDURE — 99232 SBSQ HOSP IP/OBS MODERATE 35: CPT

## 2019-10-07 RX ORDER — ENOXAPARIN SODIUM 100 MG/ML
18 INJECTION SUBCUTANEOUS
Qty: 0 | Refills: 0 | DISCHARGE
Start: 2019-10-07

## 2019-10-07 RX ORDER — ENOXAPARIN SODIUM 100 MG/ML
15 INJECTION SUBCUTANEOUS
Qty: 10 | Refills: 0
Start: 2019-10-07

## 2019-10-07 RX ORDER — METFORMIN HYDROCHLORIDE 850 MG/1
1 TABLET ORAL
Qty: 60 | Refills: 0
Start: 2019-10-07

## 2019-10-07 RX ADMIN — Medication 4: at 08:05

## 2019-10-07 RX ADMIN — ENOXAPARIN SODIUM 40 MILLIGRAM(S): 100 INJECTION SUBCUTANEOUS at 11:50

## 2019-10-07 RX ADMIN — Medication 10 MILLIGRAM(S): at 15:00

## 2019-10-07 RX ADMIN — INSULIN GLARGINE 14 UNIT(S): 100 INJECTION, SOLUTION SUBCUTANEOUS at 08:06

## 2019-10-07 RX ADMIN — Medication 2: at 11:47

## 2019-10-07 NOTE — PROGRESS NOTE ADULT - PROBLEM SELECTOR PLAN 1
Patient due to resume Pembolizumab in ambulatory next week. Patient has a PD-L1 rich tumor, which so far seems to respond to treatment. Unfortunately, it is likely that she is experiencing side effects from this class of medication, and will require insulin from now on. She is committed to continue her cancer therapy. Will resume Pembolizumab at the end of this week. OK from oncology perspective for discharge.

## 2019-10-07 NOTE — DISCHARGE NOTE PROVIDER - CARE PROVIDER_API CALL
pcp,   Phone: (   )    -  Fax: (   )    -  Follow Up Time:     endocrinology,   Phone: (   )    -  Fax: (   )    -  Follow Up Time:

## 2019-10-07 NOTE — DISCHARGE NOTE PROVIDER - NSDCCPCAREPLAN_GEN_ALL_CORE_FT
PRINCIPAL DISCHARGE DIAGNOSIS  Diagnosis: DKA (diabetic ketoacidoses)  Assessment and Plan of Treatment:

## 2019-10-07 NOTE — DISCHARGE NOTE PROVIDER - PROVIDER TOKENS
FREE:[LAST:[pcp],PHONE:[(   )    -],FAX:[(   )    -]],FREE:[LAST:[endocrinology],PHONE:[(   )    -],FAX:[(   )    -]]

## 2019-10-07 NOTE — PROGRESS NOTE ADULT - ASSESSMENT
63F with stage IV endometrial cancer (axillary,  retropectoral lymph nodes), status post systemic chemotherapy with Taxol/carbo, switched to Pembrolizumab on 5/2019 (tumor is PD L1 rich ),admitted at [facility] with diabetic ketoacidosis, thought to be related to ongoing oncologic treatment. She developed shortness of breath prior to hospitalization, associated with epigastric pain and vomiting as well as increased thirst. She was seen by endocrinology and started on insulin. Oncology consult requested in light of above.

## 2019-10-07 NOTE — PROGRESS NOTE ADULT - PROBLEM SELECTOR PLAN 2
Wendie Pembrolizumab induced type I diabetes. Had a lengthy conversation with Dr. Job Mcdaniel, who will not be able to follow patient in ambulatory, but will arrange for her discharge insulin schedule. It is my experience that the immunosuppression of pancreatic function is irreversible, and that the focus should now remain on continuing cancer treatment. Reviewed Dr. Mckinley ( endocrinology)'s note; drug- induced diabetes, behaving as type I DM, requiring indefinite insulin supplementation. Reviewed glucose readings, between 160- 280. DKA resolved. Will follow up with endocrinology in ambulatory.

## 2019-10-07 NOTE — PROGRESS NOTE ADULT - SUBJECTIVE AND OBJECTIVE BOX
STACEY TIM, 63y Female  MRN: [id number]  ATTENDING: Job Grant    HPI:  63F with stage IV endometrial cancer (axillary,  retropectoral lymph nodes), status post systemic chemotherapy with Taxol/carbo, switched to Pembrolizumab on 5/2019 (tumor is PD L1 rich ),admitted at [facility] with diabetic ketoacidosis, thought to be related to ongoing oncologic treatment. She developed shortness of breath prior to hospitalization, associated with epigastric pain and vomiting as well as increased thirst. She was seen by endocrinology ( Dr. Job Mckinley), and started on insulin. Oncology consult requested in light of above.    MEDICATIONS:  aluminum hydroxide/magnesium hydroxide/simethicone Suspension 30 milliLiter(s) Oral every 6 hours PRN  dextrose 50% Injectable 12.5 Gram(s) IV Push once  enoxaparin Injectable 40 milliGRAM(s) SubCutaneous daily  insulin glargine Injectable (LANTUS) 10 Unit(s) SubCutaneous every morning  insulin lispro (HumaLOG) corrective regimen sliding scale   SubCutaneous three times a day before meals  insulin lispro (HumaLOG) corrective regimen sliding scale   SubCutaneous at bedtime  potassium phosphate / sodium phosphate powder 2 Packet(s) Oral four times a day    All other medications reviewed.    SUBJECTIVE:  Feeling better; taking po potassium, and compliant with insulin. Appears non-toxic and in good spirits.  Wants to resume treatment with Pembrolizumab in ambulatory.    VITALS:  T(C): 37 (10-04-19 @ 05:07), Max: 37.7 (10-03-19 @ 21:21)  T(F): 98.6 (10-04-19 @ 05:07), Max: 99.8 (10-03-19 @ 21:21)  HR: 89 (10-04-19 @ 05:07) (89 - 115)  BP: 118/52 (10-04-19 @ 05:07) (100/59 - 127/71)    PHYSICAL EXAM:  Respiratory: bilateral clear to auscultation  Cardiovascular : S1, S2 rhythmic  Abdomen: soft, distended, + BS  Extremities: no tenderness;  -c/c/e    LABS:  (10-03) WBC: 9.21 K/uL,Hemoglobin: 11.9 g/dL, Hematocrit: 34.0 %,  Platelet: 309 K/uL  (10-03) Na: 137 mmol/L ; K: 4.3 mmol/L ; BUN: 3 mg/dL ; Cr: 0.52 mg/dL.    RADIOLOGY:  CT Abdomen and Pelvis w/ IV Cont (10.01.19 @ 19:46)  IMPRESSION:     Limited by extensive respiratory motion.    No pulmonary embolism in the main, right, or left pulmonary artery to the   lobar branches. Limited visualization of segmental and subsegmental   branches secondary to respiratory motion.     No acute chest pathology.    Overall improvement in disease burden in the uterus and lymph nodes   compared to 11/16/2018.    Persistent 1.2 cm nodular opacity along the right major fissure.   Continued attention on follow-up imaging. STACEY TIM, 63y Female  MRN: [id number]  ATTENDING: Job Grant    HPI:  63F with stage IV endometrial cancer (axillary,  retropectoral lymph nodes), status post systemic chemotherapy with Taxol/carbo, switched to Pembrolizumab on 5/2019 (tumor is PD L1 rich ),admitted at [facility] with diabetic ketoacidosis, thought to be related to ongoing oncologic treatment. She developed shortness of breath prior to hospitalization, associated with epigastric pain and vomiting as well as increased thirst. She was seen by endocrinology ( Dr. Job Mckinley), and started on insulin. Oncology consult requested in light of above.    MEDICATIONS:  aluminum hydroxide/magnesium hydroxide/simethicone Suspension 30 milliLiter(s) Oral every 6 hours PRN  dextrose 50% Injectable 12.5 Gram(s) IV Push once  enoxaparin Injectable 40 milliGRAM(s) SubCutaneous daily  insulin glargine Injectable (LANTUS) 10 Unit(s) SubCutaneous every morning  insulin lispro (HumaLOG) corrective regimen sliding scale   SubCutaneous three times a day before meals  insulin lispro (HumaLOG) corrective regimen sliding scale   SubCutaneous at bedtime  potassium phosphate / sodium phosphate powder 2 Packet(s) Oral four times a day    All other medications reviewed.    SUBJECTIVE:  Remains on insulin regimen per sliding scale.  Feeling more energetic. Wants to go home.    VITALS:  T(C): 36.6, Max: 36.8 (10-06-19 @ 20:50)  T(F): 97.9, Max: 98.3 (10-06-19 @ 20:50)  HR: 95 (81 - 95)  BP: 135/66 (109/63 - 135/66)  SpO2: 100% (100% - 100%)     PHYSICAL EXAM:  Respiratory: bilateral clear to auscultation  Cardiovascular : S1, S2 rhythmic  Abdomen: soft, distended, + BS  Extremities: no tenderness;  -c/c/e    LABS:  (10-03) WBC: 9.21 K/uL,Hemoglobin: 11.9 g/dL, Hematocrit: 34.0 %,  Platelet: 309 K/uL  (10-03) Na: 137 mmol/L ; K: 4.3 mmol/L ; BUN: 3 mg/dL ; Cr: 0.52 mg/dL.    RADIOLOGY:  CT Abdomen and Pelvis w/ IV Cont (10.01.19 @ 19:46)  IMPRESSION:     Limited by extensive respiratory motion.    No pulmonary embolism in the main, right, or left pulmonary artery to the   lobar branches. Limited visualization of segmental and subsegmental   branches secondary to respiratory motion.     No acute chest pathology.    Overall improvement in disease burden in the uterus and lymph nodes   compared to 11/16/2018.    Persistent 1.2 cm nodular opacity along the right major fissure.   Continued attention on follow-up imaging.

## 2019-10-07 NOTE — DISCHARGE NOTE PROVIDER - NSDCFUSCHEDAPPT_GEN_ALL_CORE_FT
STACEY TIM ; 10/11/2019 ; [facility] PreAdmits  STACEY TIM ; 10/31/2019 ; [facility] PreAdmits  STACEY TIM ; 10/31/2019 ; [facility]  STACEY TIM ; 11/21/2019 ; [facility] PreAdmits  STACEY TIM ; 12/12/2019 ; [facility] PreAdmits

## 2019-10-07 NOTE — DISCHARGE NOTE PROVIDER - HOSPITAL COURSE
INTERVAL HPI/OVERNIGHT EVENTS:  63F w/ active endometrial cancer and anxiety who presented w/ SOB, epigastric pain, nausea, vomiting, polyuria, and polydipsia and subsequently admitted to ICU for DKA.          10/3/19: Pt reports being very hungry and generally weak. Indigestion controlled. No vomiting. Pt otherwise feels well. She is eager to get out of bed. Overnight, her blood sugar was 40s and insulin drip was turned off for an hour.     10/4/19- Patient seen and examined at bedside. States she feels well. States she feels overwhelmed with the diabetes diagnosis, FS stable. Education and emotional support provided.    10.5: no cp, no sob, no n/v/d    10.6: no distress; states again she feels overwhelmed with the insulin/diabetes instructions     10.7: doesn't understand sliding scale; diabetic education done again extensively by RN                    REVIEW OF SYSTEMS:        CONSTITUTIONAL: No weakness, No fevers or chills    ENT: No ear ache, No sorethroat    NECK: No pain, No stiffness    RESPIRATORY: No cough, No wheezing, No hemoptysis; No dyspnea    CARDIOVASCULAR: No chest pain, No palpitations    GASTROINTESTINAL: No abd pain, No nausea, No vomiting, No hematemesis, No diarrhea or constipation. No melena, No hematochezia.    GENITOURINARY: No dysuria, No  hematuria    NEUROLOGICAL: No diplopia, No paresthesia, No motor dysfunction    MUSCULOSKELETAL: No arthralgia, No myalgia    SKIN: No rashes, or lesions     PSYCH: no anxiety, no suicidal ideation        All other review of systems is negative unless indicated above        Vital Signs Last 24 Hrs    T(C): 36.9 (05 Oct 2019 11:07), Max: 36.9 (05 Oct 2019 11:07)    T(F): 98.5 (05 Oct 2019 11:07), Max: 98.5 (05 Oct 2019 11:07)    HR: 88 (05 Oct 2019 11:07) (78 - 99)    BP: 129/76 (05 Oct 2019 11:07) (113/56 - 129/76)    RR: 17 (05 Oct 2019 11:07) (17 - 18)    SpO2: 100% (05 Oct 2019 11:07) (99% - 100%)        PHYSICAL EXAM:        GENERAL: NAD, Well nourished    HEENT:  NC/AT, EOMI, PERRLA, No scleral icterus, Moist mucous membranes    NECK: Supple, No JVD    CNS:  Alert & Oriented X3, Motor Strength 5/5 B/L upper and lower extremities; DTRs 2+ intact     LUNG: Normal Breath sounds, Clear to auscultation bilaterally, No rales, No rhonchi, No wheezing    HEART: RRR; No murmurs, No rubs    ABDOMEN: +BS, ST/ND/NT    GENITOURINARY: Voiding, Bladder not distended    EXTREMITIES:  2+ Peripheral Pulses, No clubbing, No cyanosis, No tibial edema    MUSCULOSKELTAL: Joints normal ROM, No TTP, No effusion    VAGINAL: deferred    SKIN: no rashes    RECTAL: deferred, not indicated    BREAST: deferred         a/p:    63F w/ active endometrial cancer and anxiety who presented w/ SOB, epigastric pain, nausea, vomiting, polyuria, and polydipsia and subsequently admitted to ICU for DKA.         1. DKA on admission: resolved     - secondary to Keytruda    - AG closed    Insulin Lantus 15u QHS    Metformin     - diabetic diet, diabetic education done extensively by me, RNs, Endocrinologist     Patient too overwhelmed with Novolog Sliding Scale; will d/c only on Lantus and Metformin, ADA    outpt f/u         2. KATHRYN    - likely 2/2 DKA    - now resolved        3. Hypokalemia and hypophosphatemia    - continue to monitor and replete as necessary         4. Endometrial cancer    - next Keytruda was planned for 10/11    - discussed w/ Dr. Greer         #Dispo: D/c home  after extensive diabetic education, time 39min

## 2019-10-09 RX ORDER — SODIUM CHLORIDE 9 MG/ML
250 INJECTION INTRAMUSCULAR; INTRAVENOUS; SUBCUTANEOUS
Refills: 0 | Status: DISCONTINUED | OUTPATIENT
Start: 2019-10-11 | End: 2020-01-11

## 2019-10-11 ENCOUNTER — OUTPATIENT (OUTPATIENT)
Dept: OUTPATIENT SERVICES | Facility: HOSPITAL | Age: 64
LOS: 1 days | End: 2019-10-11
Payer: COMMERCIAL

## 2019-10-11 VITALS — TEMPERATURE: 97 F | SYSTOLIC BLOOD PRESSURE: 127 MMHG | DIASTOLIC BLOOD PRESSURE: 79 MMHG | HEART RATE: 97 BPM

## 2019-10-11 VITALS
HEART RATE: 112 BPM | WEIGHT: 132.06 LBS | DIASTOLIC BLOOD PRESSURE: 80 MMHG | HEIGHT: 60 IN | RESPIRATION RATE: 18 BRPM | SYSTOLIC BLOOD PRESSURE: 142 MMHG | TEMPERATURE: 98 F

## 2019-10-11 DIAGNOSIS — C54.1 MALIGNANT NEOPLASM OF ENDOMETRIUM: ICD-10-CM

## 2019-10-11 DIAGNOSIS — R69 ILLNESS, UNSPECIFIED: ICD-10-CM

## 2019-10-11 LAB
24R-OH-CALCIDIOL SERPL-MCNC: 35.6 NG/ML — SIGNIFICANT CHANGE UP (ref 30–80)
ALBUMIN SERPL ELPH-MCNC: 3.9 G/DL — SIGNIFICANT CHANGE UP (ref 3.3–5)
ALP SERPL-CCNC: 65 U/L — SIGNIFICANT CHANGE UP (ref 40–120)
ALT FLD-CCNC: 58 U/L — SIGNIFICANT CHANGE UP (ref 12–78)
ANION GAP SERPL CALC-SCNC: 10 MMOL/L — SIGNIFICANT CHANGE UP (ref 5–17)
AST SERPL-CCNC: 26 U/L — SIGNIFICANT CHANGE UP (ref 15–37)
BASOPHILS # BLD AUTO: 0.03 K/UL — SIGNIFICANT CHANGE UP (ref 0–0.2)
BASOPHILS NFR BLD AUTO: 0.4 % — SIGNIFICANT CHANGE UP (ref 0–2)
BILIRUB SERPL-MCNC: 0.8 MG/DL — SIGNIFICANT CHANGE UP (ref 0.2–1.2)
BUN SERPL-MCNC: 12 MG/DL — SIGNIFICANT CHANGE UP (ref 7–23)
CALCIUM SERPL-MCNC: 9.1 MG/DL — SIGNIFICANT CHANGE UP (ref 8.5–10.1)
CANCER AG125 SERPL-ACNC: 24 U/ML — SIGNIFICANT CHANGE UP
CHLORIDE SERPL-SCNC: 102 MMOL/L — SIGNIFICANT CHANGE UP (ref 96–108)
CO2 SERPL-SCNC: 24 MMOL/L — SIGNIFICANT CHANGE UP (ref 22–31)
CREAT SERPL-MCNC: 0.76 MG/DL — SIGNIFICANT CHANGE UP (ref 0.5–1.3)
EOSINOPHIL # BLD AUTO: 0.05 K/UL — SIGNIFICANT CHANGE UP (ref 0–0.5)
EOSINOPHIL NFR BLD AUTO: 0.6 % — SIGNIFICANT CHANGE UP (ref 0–6)
GLUCOSE SERPL-MCNC: 225 MG/DL — HIGH (ref 70–99)
HCT VFR BLD CALC: 38.7 % — SIGNIFICANT CHANGE UP (ref 34.5–45)
HGB BLD-MCNC: 12.7 G/DL — SIGNIFICANT CHANGE UP (ref 11.5–15.5)
IMM GRANULOCYTES NFR BLD AUTO: 0.8 % — SIGNIFICANT CHANGE UP (ref 0–1.5)
LYMPHOCYTES # BLD AUTO: 1.43 K/UL — SIGNIFICANT CHANGE UP (ref 1–3.3)
LYMPHOCYTES # BLD AUTO: 18.1 % — SIGNIFICANT CHANGE UP (ref 13–44)
MCHC RBC-ENTMCNC: 32 PG — SIGNIFICANT CHANGE UP (ref 27–34)
MCHC RBC-ENTMCNC: 32.8 GM/DL — SIGNIFICANT CHANGE UP (ref 32–36)
MCV RBC AUTO: 97.5 FL — SIGNIFICANT CHANGE UP (ref 80–100)
MONOCYTES # BLD AUTO: 0.79 K/UL — SIGNIFICANT CHANGE UP (ref 0–0.9)
MONOCYTES NFR BLD AUTO: 10 % — SIGNIFICANT CHANGE UP (ref 2–14)
NEUTROPHILS # BLD AUTO: 5.54 K/UL — SIGNIFICANT CHANGE UP (ref 1.8–7.4)
NEUTROPHILS NFR BLD AUTO: 70.1 % — SIGNIFICANT CHANGE UP (ref 43–77)
PLATELET # BLD AUTO: 470 K/UL — HIGH (ref 150–400)
POTASSIUM SERPL-MCNC: 4.1 MMOL/L — SIGNIFICANT CHANGE UP (ref 3.5–5.3)
POTASSIUM SERPL-SCNC: 4.1 MMOL/L — SIGNIFICANT CHANGE UP (ref 3.5–5.3)
PROT SERPL-MCNC: 7 GM/DL — SIGNIFICANT CHANGE UP (ref 6–8.3)
RBC # BLD: 3.97 M/UL — SIGNIFICANT CHANGE UP (ref 3.8–5.2)
RBC # FLD: 13.7 % — SIGNIFICANT CHANGE UP (ref 10.3–14.5)
SODIUM SERPL-SCNC: 136 MMOL/L — SIGNIFICANT CHANGE UP (ref 135–145)
TSH SERPL-MCNC: 1.02 UU/ML — SIGNIFICANT CHANGE UP (ref 0.34–4.82)
WBC # BLD: 7.9 K/UL — SIGNIFICANT CHANGE UP (ref 3.8–10.5)
WBC # FLD AUTO: 7.9 K/UL — SIGNIFICANT CHANGE UP (ref 3.8–10.5)

## 2019-10-11 PROCEDURE — 86304 IMMUNOASSAY TUMOR CA 125: CPT

## 2019-10-11 PROCEDURE — 96413 CHEMO IV INFUSION 1 HR: CPT

## 2019-10-11 PROCEDURE — 85025 COMPLETE CBC W/AUTO DIFF WBC: CPT

## 2019-10-11 PROCEDURE — 82306 VITAMIN D 25 HYDROXY: CPT

## 2019-10-11 PROCEDURE — 84443 ASSAY THYROID STIM HORMONE: CPT

## 2019-10-11 PROCEDURE — 80053 COMPREHEN METABOLIC PANEL: CPT

## 2019-10-11 PROCEDURE — 36415 COLL VENOUS BLD VENIPUNCTURE: CPT

## 2019-10-11 RX ORDER — PEMBROLIZUMAB 25 MG/ML
200 INJECTION, SOLUTION INTRAVENOUS ONCE
Refills: 0 | Status: COMPLETED | OUTPATIENT
Start: 2019-10-11 | End: 2019-10-11

## 2019-10-11 RX ADMIN — SODIUM CHLORIDE 250 MILLILITER(S): 9 INJECTION INTRAMUSCULAR; INTRAVENOUS; SUBCUTANEOUS at 11:00

## 2019-10-11 RX ADMIN — SODIUM CHLORIDE 30 MILLILITER(S): 9 INJECTION INTRAMUSCULAR; INTRAVENOUS; SUBCUTANEOUS at 10:15

## 2019-10-11 RX ADMIN — PEMBROLIZUMAB 216 MILLIGRAM(S): 25 INJECTION, SOLUTION INTRAVENOUS at 10:23

## 2019-10-11 RX ADMIN — PEMBROLIZUMAB 200 MILLIGRAM(S): 25 INJECTION, SOLUTION INTRAVENOUS at 10:53

## 2019-10-14 DIAGNOSIS — E83.39 OTHER DISORDERS OF PHOSPHORUS METABOLISM: ICD-10-CM

## 2019-10-14 DIAGNOSIS — F41.9 ANXIETY DISORDER, UNSPECIFIED: ICD-10-CM

## 2019-10-14 DIAGNOSIS — C54.1 MALIGNANT NEOPLASM OF ENDOMETRIUM: ICD-10-CM

## 2019-10-14 DIAGNOSIS — K21.9 GASTRO-ESOPHAGEAL REFLUX DISEASE WITHOUT ESOPHAGITIS: ICD-10-CM

## 2019-10-14 DIAGNOSIS — N17.9 ACUTE KIDNEY FAILURE, UNSPECIFIED: ICD-10-CM

## 2019-10-14 DIAGNOSIS — Z88.8 ALLERGY STATUS TO OTHER DRUGS, MEDICAMENTS AND BIOLOGICAL SUBSTANCES STATUS: ICD-10-CM

## 2019-10-14 DIAGNOSIS — E09.10 DRUG OR CHEMICAL INDUCED DIABETES MELLITUS WITH KETOACIDOSIS WITHOUT COMA: ICD-10-CM

## 2019-10-14 DIAGNOSIS — T45.1X5A ADVERSE EFFECT OF ANTINEOPLASTIC AND IMMUNOSUPPRESSIVE DRUGS, INITIAL ENCOUNTER: ICD-10-CM

## 2019-10-14 DIAGNOSIS — E87.6 HYPOKALEMIA: ICD-10-CM

## 2019-10-18 ENCOUNTER — OTHER (OUTPATIENT)
Age: 64
End: 2019-10-18

## 2019-10-25 RX ORDER — SODIUM CHLORIDE 9 MG/ML
250 INJECTION INTRAMUSCULAR; INTRAVENOUS; SUBCUTANEOUS
Refills: 0 | Status: DISCONTINUED | OUTPATIENT
Start: 2019-10-31 | End: 2020-02-05

## 2019-10-31 ENCOUNTER — OUTPATIENT (OUTPATIENT)
Dept: OUTPATIENT SERVICES | Facility: HOSPITAL | Age: 64
LOS: 1 days | End: 2019-10-31
Payer: COMMERCIAL

## 2019-10-31 ENCOUNTER — APPOINTMENT (OUTPATIENT)
Age: 64
End: 2019-10-31

## 2019-10-31 ENCOUNTER — APPOINTMENT (OUTPATIENT)
Age: 64
End: 2019-10-31
Payer: COMMERCIAL

## 2019-10-31 ENCOUNTER — RX RENEWAL (OUTPATIENT)
Age: 64
End: 2019-10-31

## 2019-10-31 VITALS
HEIGHT: 60 IN | SYSTOLIC BLOOD PRESSURE: 147 MMHG | DIASTOLIC BLOOD PRESSURE: 87 MMHG | HEART RATE: 92 BPM | TEMPERATURE: 98 F | WEIGHT: 133 LBS | BODY MASS INDEX: 26.11 KG/M2 | RESPIRATION RATE: 16 BRPM

## 2019-10-31 VITALS
SYSTOLIC BLOOD PRESSURE: 145 MMHG | HEART RATE: 101 BPM | HEIGHT: 60 IN | RESPIRATION RATE: 16 BRPM | TEMPERATURE: 99 F | WEIGHT: 133.38 LBS | DIASTOLIC BLOOD PRESSURE: 81 MMHG

## 2019-10-31 DIAGNOSIS — C54.1 MALIGNANT NEOPLASM OF ENDOMETRIUM: ICD-10-CM

## 2019-10-31 DIAGNOSIS — R69 ILLNESS, UNSPECIFIED: ICD-10-CM

## 2019-10-31 PROBLEM — E53.8 DEFICIENCY OF OTHER SPECIFIED B GROUP VITAMINS: Chronic | Status: ACTIVE | Noted: 2019-10-04

## 2019-10-31 PROBLEM — K21.9 GASTRO-ESOPHAGEAL REFLUX DISEASE WITHOUT ESOPHAGITIS: Chronic | Status: ACTIVE | Noted: 2019-10-04

## 2019-10-31 PROBLEM — R59.0 LOCALIZED ENLARGED LYMPH NODES: Chronic | Status: ACTIVE | Noted: 2019-10-04

## 2019-10-31 PROBLEM — F41.9 ANXIETY DISORDER, UNSPECIFIED: Chronic | Status: ACTIVE | Noted: 2019-10-04

## 2019-10-31 PROBLEM — E55.9 VITAMIN D DEFICIENCY, UNSPECIFIED: Chronic | Status: ACTIVE | Noted: 2019-10-04

## 2019-10-31 PROBLEM — F51.04 PSYCHOPHYSIOLOGIC INSOMNIA: Chronic | Status: ACTIVE | Noted: 2019-10-04

## 2019-10-31 PROBLEM — K21.9 GASTRO-ESOPHAGEAL REFLUX DISEASE WITHOUT ESOPHAGITIS: Chronic | Status: ACTIVE | Noted: 2019-10-01

## 2019-10-31 PROBLEM — F41.9 ANXIETY DISORDER, UNSPECIFIED: Chronic | Status: ACTIVE | Noted: 2019-10-01

## 2019-10-31 PROCEDURE — 96372 THER/PROPH/DIAG INJ SC/IM: CPT

## 2019-10-31 PROCEDURE — 99215 OFFICE O/P EST HI 40 MIN: CPT

## 2019-10-31 PROCEDURE — 96413 CHEMO IV INFUSION 1 HR: CPT

## 2019-10-31 RX ORDER — ASCORBIC ACID 125 MG
3000 TABLET,CHEWABLE ORAL
Refills: 3 | Status: ACTIVE | COMMUNITY
Start: 2019-10-31

## 2019-10-31 RX ORDER — PREDNISONE 10 MG/1
10 TABLET ORAL DAILY
Qty: 30 | Refills: 2 | Status: DISCONTINUED | COMMUNITY
Start: 2019-08-08 | End: 2019-10-31

## 2019-10-31 RX ORDER — ALBUTEROL SULFATE 2.5 MG/3ML
(2.5 MG/3ML) SOLUTION RESPIRATORY (INHALATION)
Qty: 75 | Refills: 0 | Status: DISCONTINUED | COMMUNITY
Start: 2019-03-27 | End: 2019-10-31

## 2019-10-31 RX ORDER — PEMBROLIZUMAB 25 MG/ML
200 INJECTION, SOLUTION INTRAVENOUS ONCE
Refills: 0 | Status: COMPLETED | OUTPATIENT
Start: 2019-10-31 | End: 2019-10-31

## 2019-10-31 RX ORDER — ALBUTEROL SULFATE 90 UG/1
108 (90 BASE) INHALANT RESPIRATORY (INHALATION)
Qty: 8 | Refills: 0 | Status: DISCONTINUED | COMMUNITY
Start: 2019-04-20 | End: 2019-10-31

## 2019-10-31 RX ORDER — PREGABALIN 225 MG/1
1000 CAPSULE ORAL ONCE
Refills: 0 | Status: COMPLETED | OUTPATIENT
Start: 2019-10-31 | End: 2019-10-31

## 2019-10-31 RX ADMIN — PEMBROLIZUMAB 200 MILLIGRAM(S): 25 INJECTION, SOLUTION INTRAVENOUS at 10:39

## 2019-10-31 RX ADMIN — PREGABALIN 1000 MICROGRAM(S): 225 CAPSULE ORAL at 10:15

## 2019-10-31 RX ADMIN — SODIUM CHLORIDE 250 MILLILITER(S): 9 INJECTION INTRAMUSCULAR; INTRAVENOUS; SUBCUTANEOUS at 10:44

## 2019-10-31 RX ADMIN — PEMBROLIZUMAB 216 MILLIGRAM(S): 25 INJECTION, SOLUTION INTRAVENOUS at 10:09

## 2019-10-31 RX ADMIN — SODIUM CHLORIDE 30 MILLILITER(S): 9 INJECTION INTRAMUSCULAR; INTRAVENOUS; SUBCUTANEOUS at 09:47

## 2019-10-31 NOTE — RESULTS/DATA
[FreeTextEntry1] : Reviewed recent BW results with patient today.\par \par 10/11/19:\par WBC 7.9, hemoglobin 12.7 g/dL, hematocrit 30.7%, platelet 470,000\par : 24\par Sodium 136, potassium 4.1, BUN 12, creatinine 0.76, glucose 225\par SGOT 26, SGPT 58,\par EGFR: 83 \par \par

## 2019-10-31 NOTE — REVIEW OF SYSTEMS
[Dysmenorrhea/Abn Vaginal Bleeding] : dysmenorrhea/abnormal vaginal bleeding [Insomnia] : insomnia [Anxiety] : anxiety [Negative] : Heme/Lymph [Chest Pain] : no chest pain [Lower Ext Edema] : no lower extremity edema [Shortness Of Breath] : no shortness of breath [Cough] : no cough [Abdominal Pain] : no abdominal pain [Vomiting] : no vomiting [Skin Rash] : no skin rash [Easy Bleeding] : no tendency for easy bleeding [Easy Bruising] : no tendency for easy bruising [de-identified] : bilateral hands stiffness and sensory paresthesias.

## 2019-10-31 NOTE — HISTORY OF PRESENT ILLNESS
[Disease: _____________________] : Disease: [unfilled] [AJCC Stage: ____] : AJCC Stage: [unfilled] [de-identified] : 63 years old postmenopausal  female , with stage III C poorly differentiated, endometrioid carcinoma of the uterus diagnosed in 2018.\par \par CASE SYNOPSIS:\par \par 2018:\par Cervical mass biopsy – poorly differentiated carcinoma, favoring endometrial origin.\par \par 2018:\par Pelvic US (performed at [facility]): uterus size 9.9 x 5.3x 4.4 cm, endometrial mucosa thickness 24 mm, cervix with lobular appearance and heterogenous echo texture. Adnexae unremarkable, but a heterogenous solid  mass, measuring 3.8 x 3.6 x 2.8 cm, is described adjacent to the right ovary.\par \par 2018:\par CT C/A/P: No evidence of distant metastases; significant distension endometrial cavity (16 mm in size), extending into enlarged bulky cervix, with questionable proximal vaginal extension. Significant, diffuse retroperitoneal and pelvic lymphadenopathy ( lymph nodes in right pelvis abutting the right ovary, as seen on pelvis ultrasound.\par \par 2018:\par Pathology review [facility]: poorly differentiated, endometrioid adenocarcinoma; IHC non- contributory; cannot distinguished cervical vs endometrial origin.\par \par 2018:\par Started  Taxol/Carbo ( initially Q 21, switched to weekly schedule due to significant skin rash development after 1st dose).\par \par 2019: \par Foundation One study consistent with MSI- H, TBM- high status, no other actionable target gene alterations.\par \par 2019: continues cycle #4 chemotherapy with Taxol/ Carboplatin.\par   dropped from 269 (18) to 42 (19). \par \par 2019:\par PET/CT - decrease in FDG avidity in both the endometrial cavity, and in the retroperitoneal and abdominopelvic lymphadenopathy. Unchanged size  of bilateral retrocrural LAD.\par Multiple FDG avid left axillary and retropectoral lymph nodes (? new)- for which FNA recommended.\par Unchanged, minimally changed FDG avid nodular opacity RML.\par \par 19:\par Mammography: Area of architectural distortion central left breast, correlating with US -described lobulated hypoechoic mass left breast 12:00, 5 cm from the nipple.\par Breast ultrasound:\par Left breast 12:00 5 cm from the nipple lobulated hypoechoic mass with associated architectural distortion. Multiple abnormal left axillary lymph nodes with focal cortical thickening. Ultrasound-guided core biopsy recommended for both breast mass and axillary lymph node. \par A 12:00 retroareolar 0.4 cm intraductal mass, for which ultrasound guided core biopsy recommended.\par \par 2019:\par Patient developed rash on upper extremities after D1, cycle #6 Carboplatin; chemotherapy discontinued.\par \par 2019:\par US –guided left breast biopsy – pathology breast consistent with intraductal papilloma left breast with ductal hyperplasia, adenosis, apocrine metaplasia. \par Left axillary LN biopsy – metastatic adenocarcinoma with papillary features, compatible with metastatic disease from known endometrial primary.\par \par 19- started Pembrolizumab.\par  \par 19-  dropped from 163 to 28.\par \par 10/1/19- admitted at [facility] with KATHRYN and DKA; diagnosed with type I diabetes, and started insulin sliding scale.\par \par 10/1/19: CT C/A/P-overall improvement in disease burden in the uterus and lymph nodes compared to 18. \par Persistent 1.2 cm nodular opacity along the right major fissure. No acute chest pathology, no PE.\par \par  [de-identified] : poorly differentiated, endometrioid [de-identified] :  [FreeTextEntry1] : neoadjuvant Taxol/ Carboplatin \par Pembrolizumab [de-identified] : Patient returning for followup; received last dose of Pembrolizumab on 10/11/19. Patient was hospitalized in DKA and KATHRYN between October 1 and October 7, 2019, at [facility]. She was admitted with a glucose about 600 and diagnosed with type 1 diabetes thought to be related to immunotherapy. CT C/A/P consistent with overall improvement in disease burden in the uterus and lymph nodes compared to 11/16/18. She was subsequently discharged on insulin sliding scale, and followed up and ambulatory with endocrinology ( Dr. Mckeon) . Received diabetic education while in hospital, and currently she is compliant with insulin regimen, with good glucose control. Her polydipsia and polyuria have resolved. She is here for followup; states she is feeling fine, and continues to tolerate immunotherapy well.Lost approximately 10 pounds during the last hospitalization. Accompanied by her son, [email or web address]

## 2019-11-01 DIAGNOSIS — E53.8 DEFICIENCY OF OTHER SPECIFIED B GROUP VITAMINS: ICD-10-CM

## 2019-11-15 RX ORDER — SODIUM CHLORIDE 9 MG/ML
250 INJECTION INTRAMUSCULAR; INTRAVENOUS; SUBCUTANEOUS
Refills: 0 | Status: DISCONTINUED | OUTPATIENT
Start: 2019-11-21 | End: 2020-02-22

## 2019-11-21 ENCOUNTER — OUTPATIENT (OUTPATIENT)
Dept: OUTPATIENT SERVICES | Facility: HOSPITAL | Age: 64
LOS: 1 days | End: 2019-11-21
Payer: COMMERCIAL

## 2019-11-21 VITALS
TEMPERATURE: 98 F | WEIGHT: 132.06 LBS | SYSTOLIC BLOOD PRESSURE: 135 MMHG | DIASTOLIC BLOOD PRESSURE: 76 MMHG | HEART RATE: 93 BPM

## 2019-11-21 VITALS — HEART RATE: 78 BPM | DIASTOLIC BLOOD PRESSURE: 80 MMHG | SYSTOLIC BLOOD PRESSURE: 129 MMHG

## 2019-11-21 DIAGNOSIS — R69 ILLNESS, UNSPECIFIED: ICD-10-CM

## 2019-11-21 PROCEDURE — 96413 CHEMO IV INFUSION 1 HR: CPT

## 2019-11-21 RX ORDER — PEMBROLIZUMAB 25 MG/ML
200 INJECTION, SOLUTION INTRAVENOUS ONCE
Refills: 0 | Status: COMPLETED | OUTPATIENT
Start: 2019-11-21 | End: 2019-11-21

## 2019-11-21 RX ADMIN — SODIUM CHLORIDE 250 MILLILITER(S): 9 INJECTION INTRAMUSCULAR; INTRAVENOUS; SUBCUTANEOUS at 11:10

## 2019-11-21 RX ADMIN — PEMBROLIZUMAB 216 MILLIGRAM(S): 25 INJECTION, SOLUTION INTRAVENOUS at 10:33

## 2019-11-21 RX ADMIN — SODIUM CHLORIDE 30 MILLILITER(S): 9 INJECTION INTRAMUSCULAR; INTRAVENOUS; SUBCUTANEOUS at 10:10

## 2019-11-21 RX ADMIN — PEMBROLIZUMAB 200 MILLIGRAM(S): 25 INJECTION, SOLUTION INTRAVENOUS at 11:05

## 2019-11-22 DIAGNOSIS — C54.1 MALIGNANT NEOPLASM OF ENDOMETRIUM: ICD-10-CM

## 2019-11-22 DIAGNOSIS — R69 ILLNESS, UNSPECIFIED: ICD-10-CM

## 2019-12-10 RX ORDER — SODIUM CHLORIDE 9 MG/ML
250 INJECTION INTRAMUSCULAR; INTRAVENOUS; SUBCUTANEOUS
Refills: 0 | Status: DISCONTINUED | OUTPATIENT
Start: 2019-12-12 | End: 2020-03-13

## 2019-12-12 ENCOUNTER — OUTPATIENT (OUTPATIENT)
Dept: OUTPATIENT SERVICES | Facility: HOSPITAL | Age: 64
LOS: 1 days | End: 2019-12-12
Payer: COMMERCIAL

## 2019-12-12 VITALS — SYSTOLIC BLOOD PRESSURE: 127 MMHG | DIASTOLIC BLOOD PRESSURE: 78 MMHG | HEART RATE: 83 BPM

## 2019-12-12 VITALS
SYSTOLIC BLOOD PRESSURE: 145 MMHG | WEIGHT: 130.51 LBS | HEART RATE: 96 BPM | TEMPERATURE: 98 F | DIASTOLIC BLOOD PRESSURE: 73 MMHG | HEIGHT: 60 IN | RESPIRATION RATE: 16 BRPM

## 2019-12-12 DIAGNOSIS — R69 ILLNESS, UNSPECIFIED: ICD-10-CM

## 2019-12-12 DIAGNOSIS — C54.1 MALIGNANT NEOPLASM OF ENDOMETRIUM: ICD-10-CM

## 2019-12-12 LAB
ALBUMIN SERPL ELPH-MCNC: 3.9 G/DL — SIGNIFICANT CHANGE UP (ref 3.3–5)
ALP SERPL-CCNC: 76 U/L — SIGNIFICANT CHANGE UP (ref 40–120)
ALT FLD-CCNC: 21 U/L — SIGNIFICANT CHANGE UP (ref 12–78)
ANION GAP SERPL CALC-SCNC: 7 MMOL/L — SIGNIFICANT CHANGE UP (ref 5–17)
AST SERPL-CCNC: 15 U/L — SIGNIFICANT CHANGE UP (ref 15–37)
BASOPHILS # BLD AUTO: 0.04 K/UL — SIGNIFICANT CHANGE UP (ref 0–0.2)
BASOPHILS NFR BLD AUTO: 0.5 % — SIGNIFICANT CHANGE UP (ref 0–2)
BILIRUB SERPL-MCNC: 0.6 MG/DL — SIGNIFICANT CHANGE UP (ref 0.2–1.2)
BUN SERPL-MCNC: 15 MG/DL — SIGNIFICANT CHANGE UP (ref 7–23)
CALCIUM SERPL-MCNC: 9 MG/DL — SIGNIFICANT CHANGE UP (ref 8.5–10.1)
CANCER AG125 SERPL-ACNC: 25 U/ML — SIGNIFICANT CHANGE UP
CHLORIDE SERPL-SCNC: 104 MMOL/L — SIGNIFICANT CHANGE UP (ref 96–108)
CO2 SERPL-SCNC: 25 MMOL/L — SIGNIFICANT CHANGE UP (ref 22–31)
CREAT SERPL-MCNC: 0.77 MG/DL — SIGNIFICANT CHANGE UP (ref 0.5–1.3)
EOSINOPHIL # BLD AUTO: 0.17 K/UL — SIGNIFICANT CHANGE UP (ref 0–0.5)
EOSINOPHIL NFR BLD AUTO: 2.3 % — SIGNIFICANT CHANGE UP (ref 0–6)
GLUCOSE SERPL-MCNC: 262 MG/DL — HIGH (ref 70–99)
HCT VFR BLD CALC: 40.5 % — SIGNIFICANT CHANGE UP (ref 34.5–45)
HGB BLD-MCNC: 13.5 G/DL — SIGNIFICANT CHANGE UP (ref 11.5–15.5)
IMM GRANULOCYTES NFR BLD AUTO: 0.1 % — SIGNIFICANT CHANGE UP (ref 0–1.5)
LYMPHOCYTES # BLD AUTO: 1.21 K/UL — SIGNIFICANT CHANGE UP (ref 1–3.3)
LYMPHOCYTES # BLD AUTO: 16.4 % — SIGNIFICANT CHANGE UP (ref 13–44)
MCHC RBC-ENTMCNC: 32.4 PG — SIGNIFICANT CHANGE UP (ref 27–34)
MCHC RBC-ENTMCNC: 33.3 GM/DL — SIGNIFICANT CHANGE UP (ref 32–36)
MCV RBC AUTO: 97.1 FL — SIGNIFICANT CHANGE UP (ref 80–100)
MONOCYTES # BLD AUTO: 0.47 K/UL — SIGNIFICANT CHANGE UP (ref 0–0.9)
MONOCYTES NFR BLD AUTO: 6.4 % — SIGNIFICANT CHANGE UP (ref 2–14)
NEUTROPHILS # BLD AUTO: 5.46 K/UL — SIGNIFICANT CHANGE UP (ref 1.8–7.4)
NEUTROPHILS NFR BLD AUTO: 74.3 % — SIGNIFICANT CHANGE UP (ref 43–77)
PLATELET # BLD AUTO: 340 K/UL — SIGNIFICANT CHANGE UP (ref 150–400)
POTASSIUM SERPL-MCNC: 4.6 MMOL/L — SIGNIFICANT CHANGE UP (ref 3.5–5.3)
POTASSIUM SERPL-SCNC: 4.6 MMOL/L — SIGNIFICANT CHANGE UP (ref 3.5–5.3)
PROT SERPL-MCNC: 7.2 GM/DL — SIGNIFICANT CHANGE UP (ref 6–8.3)
RBC # BLD: 4.17 M/UL — SIGNIFICANT CHANGE UP (ref 3.8–5.2)
RBC # FLD: 12 % — SIGNIFICANT CHANGE UP (ref 10.3–14.5)
SODIUM SERPL-SCNC: 136 MMOL/L — SIGNIFICANT CHANGE UP (ref 135–145)
T4 FREE SERPL-MCNC: 1.26 NG/DL — SIGNIFICANT CHANGE UP (ref 0.76–1.46)
TSH SERPL-MCNC: 0.5 UU/ML — SIGNIFICANT CHANGE UP (ref 0.34–4.82)
TSH SERPL-MCNC: 0.51 UU/ML — SIGNIFICANT CHANGE UP (ref 0.34–4.82)
VIT B12 SERPL-MCNC: 949 PG/ML — SIGNIFICANT CHANGE UP (ref 232–1245)
WBC # BLD: 7.36 K/UL — SIGNIFICANT CHANGE UP (ref 3.8–10.5)
WBC # FLD AUTO: 7.36 K/UL — SIGNIFICANT CHANGE UP (ref 3.8–10.5)

## 2019-12-12 PROCEDURE — 36415 COLL VENOUS BLD VENIPUNCTURE: CPT

## 2019-12-12 PROCEDURE — 86304 IMMUNOASSAY TUMOR CA 125: CPT

## 2019-12-12 PROCEDURE — 84443 ASSAY THYROID STIM HORMONE: CPT

## 2019-12-12 PROCEDURE — 84439 ASSAY OF FREE THYROXINE: CPT

## 2019-12-12 PROCEDURE — 85025 COMPLETE CBC W/AUTO DIFF WBC: CPT

## 2019-12-12 PROCEDURE — 80053 COMPREHEN METABOLIC PANEL: CPT

## 2019-12-12 PROCEDURE — 82607 VITAMIN B-12: CPT

## 2019-12-12 PROCEDURE — 96413 CHEMO IV INFUSION 1 HR: CPT

## 2019-12-12 RX ORDER — PEMBROLIZUMAB 25 MG/ML
200 INJECTION, SOLUTION INTRAVENOUS ONCE
Refills: 0 | Status: COMPLETED | OUTPATIENT
Start: 2019-12-12 | End: 2019-12-12

## 2019-12-12 RX ADMIN — SODIUM CHLORIDE 30 MILLILITER(S): 9 INJECTION INTRAMUSCULAR; INTRAVENOUS; SUBCUTANEOUS at 10:02

## 2019-12-12 RX ADMIN — PEMBROLIZUMAB 216 MILLIGRAM(S): 25 INJECTION, SOLUTION INTRAVENOUS at 10:11

## 2019-12-12 RX ADMIN — SODIUM CHLORIDE 250 MILLILITER(S): 9 INJECTION INTRAMUSCULAR; INTRAVENOUS; SUBCUTANEOUS at 10:47

## 2019-12-12 RX ADMIN — PEMBROLIZUMAB 200 MILLIGRAM(S): 25 INJECTION, SOLUTION INTRAVENOUS at 10:45
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
## 2019-12-24 ENCOUNTER — OUTPATIENT (OUTPATIENT)
Dept: OUTPATIENT SERVICES | Facility: HOSPITAL | Age: 64
LOS: 1 days | Discharge: HOME | End: 2019-12-24

## 2019-12-24 DIAGNOSIS — C54.1 MALIGNANT NEOPLASM OF ENDOMETRIUM: ICD-10-CM

## 2019-12-31 RX ORDER — SODIUM CHLORIDE 9 MG/ML
250 INJECTION INTRAMUSCULAR; INTRAVENOUS; SUBCUTANEOUS
Refills: 0 | Status: DISCONTINUED | OUTPATIENT
Start: 2020-01-02 | End: 2020-02-13

## 2020-01-02 ENCOUNTER — OTHER (OUTPATIENT)
Age: 65
End: 2020-01-02

## 2020-01-02 ENCOUNTER — OUTPATIENT (OUTPATIENT)
Dept: OUTPATIENT SERVICES | Facility: HOSPITAL | Age: 65
LOS: 1 days | End: 2020-01-02
Payer: COMMERCIAL

## 2020-01-02 ENCOUNTER — APPOINTMENT (OUTPATIENT)
Age: 65
End: 2020-01-02

## 2020-01-02 VITALS — SYSTOLIC BLOOD PRESSURE: 113 MMHG | DIASTOLIC BLOOD PRESSURE: 75 MMHG | HEART RATE: 82 BPM

## 2020-01-02 VITALS — DIASTOLIC BLOOD PRESSURE: 73 MMHG | SYSTOLIC BLOOD PRESSURE: 131 MMHG | HEART RATE: 75 BPM | TEMPERATURE: 98 F

## 2020-01-02 DIAGNOSIS — R69 ILLNESS, UNSPECIFIED: ICD-10-CM

## 2020-01-02 DIAGNOSIS — C54.1 MALIGNANT NEOPLASM OF ENDOMETRIUM: ICD-10-CM

## 2020-01-02 PROCEDURE — 96413 CHEMO IV INFUSION 1 HR: CPT

## 2020-01-02 RX ORDER — PEMBROLIZUMAB 25 MG/ML
200 INJECTION, SOLUTION INTRAVENOUS ONCE
Refills: 0 | Status: COMPLETED | OUTPATIENT
Start: 2020-01-02 | End: 2020-01-02

## 2020-01-02 RX ADMIN — SODIUM CHLORIDE 250 MILLILITER(S): 9 INJECTION INTRAMUSCULAR; INTRAVENOUS; SUBCUTANEOUS at 11:20

## 2020-01-02 RX ADMIN — SODIUM CHLORIDE 30 MILLILITER(S): 9 INJECTION INTRAMUSCULAR; INTRAVENOUS; SUBCUTANEOUS at 10:36

## 2020-01-02 RX ADMIN — PEMBROLIZUMAB 216 MILLIGRAM(S): 25 INJECTION, SOLUTION INTRAVENOUS at 10:37

## 2020-01-02 RX ADMIN — PEMBROLIZUMAB 200 MILLIGRAM(S): 25 INJECTION, SOLUTION INTRAVENOUS at 11:12

## 2020-01-21 NOTE — PHARMACOTHERAPY INTERVENTION NOTE - COMMENTS
Educated patient on the two different types of insulin they'll be potentially discharged on: insulin glargine (Lantus) and Humalog sliding scale. However, I was not able to provide the patient with the specific units to be administered after discharge--has not been determined yet. Educated the patient and her son a few times on the different insulins such as administration, storage, and stability. The son brought in the blood glucose monitor requested by the patient at bedside. I educated the patient and her son regarding the blood glucose monitor at bedside. Had the patient teach back using their own BGM. After thorough education for both the insulin and the BGM, I assessed the patients knowledge on a scale of 1-10. The patient stated the understanding of insulins and BGM was initially zero and now its a 3. Patient requested another education session, because she's feeling very overwhelmed and feels the second time would be helpful. Patient feels the pens would be easier to use. The patient appreciated the education on her insulins and BGM. Total time spent was approximately 40 minutes.
Med history completed, verified with patient and doctor first. Per patient, she does not remember the doses of her medications but should be consistent with what her pharmacy dispensed, relied more on doctor first for dosing. Per patient, she is also on Keytruda.
called patient's pharmacy to confirm insurance coverage. Lantus changed to Levemir.
Reviewed the use of the insulin pen with patient. Patient confirms good understanding
oral

## 2020-01-23 ENCOUNTER — OUTPATIENT (OUTPATIENT)
Dept: OUTPATIENT SERVICES | Facility: HOSPITAL | Age: 65
LOS: 1 days | End: 2020-01-23
Payer: COMMERCIAL

## 2020-01-23 ENCOUNTER — APPOINTMENT (OUTPATIENT)
Age: 65
End: 2020-01-23
Payer: COMMERCIAL

## 2020-01-23 VITALS
HEIGHT: 60 IN | TEMPERATURE: 98 F | HEART RATE: 102 BPM | DIASTOLIC BLOOD PRESSURE: 81 MMHG | SYSTOLIC BLOOD PRESSURE: 148 MMHG | WEIGHT: 124.34 LBS

## 2020-01-23 VITALS
HEART RATE: 90 BPM | DIASTOLIC BLOOD PRESSURE: 80 MMHG | TEMPERATURE: 98.2 F | WEIGHT: 125.31 LBS | BODY MASS INDEX: 24.6 KG/M2 | SYSTOLIC BLOOD PRESSURE: 146 MMHG | HEIGHT: 60 IN | RESPIRATION RATE: 16 BRPM

## 2020-01-23 VITALS — SYSTOLIC BLOOD PRESSURE: 124 MMHG | DIASTOLIC BLOOD PRESSURE: 79 MMHG | HEART RATE: 73 BPM

## 2020-01-23 DIAGNOSIS — E09.9 DRUG OR CHEMICAL INDUCED DIABETES MELLITUS W/OUT COMPLICATIONS: ICD-10-CM

## 2020-01-23 DIAGNOSIS — C54.1 MALIGNANT NEOPLASM OF ENDOMETRIUM: ICD-10-CM

## 2020-01-23 DIAGNOSIS — R69 ILLNESS, UNSPECIFIED: ICD-10-CM

## 2020-01-23 LAB
ALBUMIN SERPL ELPH-MCNC: 4 G/DL — SIGNIFICANT CHANGE UP (ref 3.3–5)
ALP SERPL-CCNC: 71 U/L — SIGNIFICANT CHANGE UP (ref 40–120)
ALT FLD-CCNC: 23 U/L — SIGNIFICANT CHANGE UP (ref 12–78)
ANION GAP SERPL CALC-SCNC: 5 MMOL/L — SIGNIFICANT CHANGE UP (ref 5–17)
AST SERPL-CCNC: 11 U/L — LOW (ref 15–37)
BASOPHILS # BLD AUTO: 0.03 K/UL — SIGNIFICANT CHANGE UP (ref 0–0.2)
BASOPHILS NFR BLD AUTO: 0.4 % — SIGNIFICANT CHANGE UP (ref 0–2)
BILIRUB SERPL-MCNC: 0.6 MG/DL — SIGNIFICANT CHANGE UP (ref 0.2–1.2)
BUN SERPL-MCNC: 12 MG/DL — SIGNIFICANT CHANGE UP (ref 7–23)
CALCIUM SERPL-MCNC: 8.8 MG/DL — SIGNIFICANT CHANGE UP (ref 8.5–10.1)
CANCER AG125 SERPL-ACNC: 44 U/ML — HIGH
CHLORIDE SERPL-SCNC: 104 MMOL/L — SIGNIFICANT CHANGE UP (ref 96–108)
CO2 SERPL-SCNC: 27 MMOL/L — SIGNIFICANT CHANGE UP (ref 22–31)
CREAT SERPL-MCNC: 0.64 MG/DL — SIGNIFICANT CHANGE UP (ref 0.5–1.3)
EOSINOPHIL # BLD AUTO: 0.05 K/UL — SIGNIFICANT CHANGE UP (ref 0–0.5)
EOSINOPHIL NFR BLD AUTO: 0.6 % — SIGNIFICANT CHANGE UP (ref 0–6)
GLUCOSE SERPL-MCNC: 259 MG/DL — HIGH (ref 70–99)
HCT VFR BLD CALC: 38.8 % — SIGNIFICANT CHANGE UP (ref 34.5–45)
HGB BLD-MCNC: 12.8 G/DL — SIGNIFICANT CHANGE UP (ref 11.5–15.5)
IMM GRANULOCYTES NFR BLD AUTO: 0.4 % — SIGNIFICANT CHANGE UP (ref 0–1.5)
LYMPHOCYTES # BLD AUTO: 0.88 K/UL — LOW (ref 1–3.3)
LYMPHOCYTES # BLD AUTO: 10.9 % — LOW (ref 13–44)
MCHC RBC-ENTMCNC: 31.3 PG — SIGNIFICANT CHANGE UP (ref 27–34)
MCHC RBC-ENTMCNC: 33 GM/DL — SIGNIFICANT CHANGE UP (ref 32–36)
MCV RBC AUTO: 94.9 FL — SIGNIFICANT CHANGE UP (ref 80–100)
MONOCYTES # BLD AUTO: 0.45 K/UL — SIGNIFICANT CHANGE UP (ref 0–0.9)
MONOCYTES NFR BLD AUTO: 5.6 % — SIGNIFICANT CHANGE UP (ref 2–14)
NEUTROPHILS # BLD AUTO: 6.64 K/UL — SIGNIFICANT CHANGE UP (ref 1.8–7.4)
NEUTROPHILS NFR BLD AUTO: 82.1 % — HIGH (ref 43–77)
PLATELET # BLD AUTO: 299 K/UL — SIGNIFICANT CHANGE UP (ref 150–400)
POTASSIUM SERPL-MCNC: 3.9 MMOL/L — SIGNIFICANT CHANGE UP (ref 3.5–5.3)
POTASSIUM SERPL-SCNC: 3.9 MMOL/L — SIGNIFICANT CHANGE UP (ref 3.5–5.3)
PROT SERPL-MCNC: 6.8 GM/DL — SIGNIFICANT CHANGE UP (ref 6–8.3)
RBC # BLD: 4.09 M/UL — SIGNIFICANT CHANGE UP (ref 3.8–5.2)
RBC # FLD: 12.4 % — SIGNIFICANT CHANGE UP (ref 10.3–14.5)
SODIUM SERPL-SCNC: 136 MMOL/L — SIGNIFICANT CHANGE UP (ref 135–145)
WBC # BLD: 8.08 K/UL — SIGNIFICANT CHANGE UP (ref 3.8–10.5)
WBC # FLD AUTO: 8.08 K/UL — SIGNIFICANT CHANGE UP (ref 3.8–10.5)

## 2020-01-23 PROCEDURE — 36415 COLL VENOUS BLD VENIPUNCTURE: CPT

## 2020-01-23 PROCEDURE — 99214 OFFICE O/P EST MOD 30 MIN: CPT

## 2020-01-23 PROCEDURE — 96413 CHEMO IV INFUSION 1 HR: CPT

## 2020-01-23 PROCEDURE — 85025 COMPLETE CBC W/AUTO DIFF WBC: CPT

## 2020-01-23 PROCEDURE — 80053 COMPREHEN METABOLIC PANEL: CPT

## 2020-01-23 PROCEDURE — 86304 IMMUNOASSAY TUMOR CA 125: CPT

## 2020-01-23 RX ORDER — SODIUM CHLORIDE 9 MG/ML
250 INJECTION INTRAMUSCULAR; INTRAVENOUS; SUBCUTANEOUS
Refills: 0 | Status: DISCONTINUED | OUTPATIENT
Start: 2020-01-23 | End: 2020-02-13

## 2020-01-23 RX ORDER — PEMBROLIZUMAB 25 MG/ML
200 INJECTION, SOLUTION INTRAVENOUS ONCE
Refills: 0 | Status: COMPLETED | OUTPATIENT
Start: 2020-01-23 | End: 2020-01-23

## 2020-01-23 RX ADMIN — SODIUM CHLORIDE 30 MILLILITER(S): 9 INJECTION INTRAMUSCULAR; INTRAVENOUS; SUBCUTANEOUS at 10:40

## 2020-01-23 RX ADMIN — PEMBROLIZUMAB 200 MILLIGRAM(S): 25 INJECTION, SOLUTION INTRAVENOUS at 11:34

## 2020-01-23 RX ADMIN — PEMBROLIZUMAB 216 MILLIGRAM(S): 25 INJECTION, SOLUTION INTRAVENOUS at 11:04

## 2020-01-23 RX ADMIN — SODIUM CHLORIDE 250 MILLILITER(S): 9 INJECTION INTRAMUSCULAR; INTRAVENOUS; SUBCUTANEOUS at 11:40

## 2020-01-23 NOTE — ASSESSMENT
[FreeTextEntry1] : Ms. TIM and her son 's questions were answered to their satisfaction. She will return to the office in 2 months.\par \par

## 2020-01-23 NOTE — HISTORY OF PRESENT ILLNESS
[Disease: _____________________] : Disease: [unfilled] [AJCC Stage: ____] : AJCC Stage: [unfilled] [de-identified] : 64 years old postmenopausal  female , with stage III C poorly differentiated, endometrioid carcinoma of the uterus diagnosed in 2018.\par \par CASE SYNOPSIS:\par \par 2018:\par Cervical mass biopsy – poorly differentiated carcinoma, favoring endometrial origin.\par \par 2018:\par Pelvic US (performed at [facility]): uterus size 9.9 x 5.3x 4.4 cm, endometrial mucosa thickness 24 mm, cervix with lobular appearance and heterogenous echo texture. Adnexae unremarkable, but a heterogenous solid  mass, measuring 3.8 x 3.6 x 2.8 cm, is described adjacent to the right ovary.\par \par 2018:\par CT C/A/P: No evidence of distant metastases; significant distension endometrial cavity (16 mm in size), extending into enlarged bulky cervix, with questionable proximal vaginal extension. Significant, diffuse retroperitoneal and pelvic lymphadenopathy ( lymph nodes in right pelvis abutting the right ovary, as seen on pelvis ultrasound.\par \par 2018:\par Pathology review [facility]: poorly differentiated, endometrioid adenocarcinoma; IHC non- contributory; cannot distinguished cervical vs endometrial origin.\par \par 2018:\par Started  Taxol/Carbo ( initially Q 21, switched to weekly schedule due to significant skin rash development after 1st dose).\par \par 2019: \par Foundation One study consistent with MSI- H, TBM- high status, no other actionable target gene alterations.\par \par 2019: continues cycle #4 chemotherapy with Taxol/ Carboplatin.\par   dropped from 269 (18) to 42 (19). \par \par 2019:\par PET/CT - decrease in FDG avidity in both the endometrial cavity, and in the retroperitoneal and abdominopelvic lymphadenopathy. Unchanged size  of bilateral retrocrural LAD.\par Multiple FDG avid left axillary and retropectoral lymph nodes (? new)- for which FNA recommended.\par Unchanged, minimally changed FDG avid nodular opacity RML.\par \par 19:\par Mammography: Area of architectural distortion central left breast, correlating with US -described lobulated hypoechoic mass left breast 12:00, 5 cm from the nipple.\par Breast ultrasound:\par Left breast 12:00 5 cm from the nipple lobulated hypoechoic mass with associated architectural distortion. Multiple abnormal left axillary lymph nodes with focal cortical thickening. Ultrasound-guided core biopsy recommended for both breast mass and axillary lymph node. \par A 12:00 retroareolar 0.4 cm intraductal mass, for which ultrasound guided core biopsy recommended.\par \par 2019:\par Patient developed rash on upper extremities after D1, cycle #6 Carboplatin; chemotherapy discontinued.\par \par 2019:\par US –guided left breast biopsy – pathology breast consistent with intraductal papilloma left breast with ductal hyperplasia, adenosis, apocrine metaplasia. \par Left axillary LN biopsy – metastatic adenocarcinoma with papillary features, compatible with metastatic disease from known endometrial primary.\par \par 19- started Pembrolizumab.\par  \par 19-  dropped from 163 to 28.\par \par 10/1/19- admitted at [facility] with KATHRYN and DKA; diagnosed with type I diabetes, and started insulin sliding scale.\par \par 10/1/19: CT C/A/P-overall improvement in disease burden in the uterus and lymph nodes compared to 18. \par Persistent 1.2 cm nodular opacity along the right major fissure. No acute chest pathology, no PE.\par \par 19: -   25 U/mL.\par \par  [de-identified] : poorly differentiated, endometrioid [de-identified] :  [FreeTextEntry1] : neoadjuvant Taxol/ Carboplatin \par Pembrolizumab [de-identified] : Received cycle #12 of Prembrolizumab on 1/2/20; since last visit, 2 months ago, patient has been compliant with medication, including insulin for newly diagnosed type 1 diabetes. Denies any hospitalization. Serum tumor marker  continued to further drop from 27  (in August 2019) to 25 U/ mL (in mid-December 2019). Last CT C/A/P show overall improvement in burden of disease both in uterus and in pelvic lymph nodes. Patient enjoys good quality of life, and continues to work. She is losing weight due to dietary adjustments; to see Dr. Perez next week. Accompanied by her son, Víctor.

## 2020-01-23 NOTE — REVIEW OF SYSTEMS
[Dysmenorrhea/Abn Vaginal Bleeding] : dysmenorrhea/abnormal vaginal bleeding [Insomnia] : insomnia [Anxiety] : anxiety [Negative] : Heme/Lymph [Chest Pain] : no chest pain [Lower Ext Edema] : no lower extremity edema [Shortness Of Breath] : no shortness of breath [Cough] : no cough [Abdominal Pain] : no abdominal pain [Vomiting] : no vomiting [Skin Rash] : no skin rash [Easy Bleeding] : no tendency for easy bleeding [Easy Bruising] : no tendency for easy bruising [de-identified] : bilateral hands stiffness and sensory paresthesias.

## 2020-01-23 NOTE — RESULTS/DATA
[FreeTextEntry1] : Reviewed recent BW results with patient today.\par \par 12/12/19:\par WBC 7.36, hemoglobin 13.5 g/dL, hematocrit 40.5%, platelet 340,000.\par \par 10/11/19:\par WBC 7.9, hemoglobin 12.7 g/dL, hematocrit 30.7%, platelet 470,000\par : 24\par Sodium 136, potassium 4.1, BUN 12, creatinine 0.76, glucose 225\par SGOT 26, SGPT 58,\par EGFR: 83 \par \par

## 2020-02-13 ENCOUNTER — OUTPATIENT (OUTPATIENT)
Dept: OUTPATIENT SERVICES | Facility: HOSPITAL | Age: 65
LOS: 1 days | End: 2020-02-13
Payer: COMMERCIAL

## 2020-02-13 VITALS
TEMPERATURE: 98 F | SYSTOLIC BLOOD PRESSURE: 144 MMHG | HEART RATE: 77 BPM | RESPIRATION RATE: 16 BRPM | DIASTOLIC BLOOD PRESSURE: 81 MMHG

## 2020-02-13 VITALS — SYSTOLIC BLOOD PRESSURE: 118 MMHG | DIASTOLIC BLOOD PRESSURE: 78 MMHG | HEART RATE: 80 BPM

## 2020-02-13 DIAGNOSIS — C54.1 MALIGNANT NEOPLASM OF ENDOMETRIUM: ICD-10-CM

## 2020-02-13 DIAGNOSIS — R69 ILLNESS, UNSPECIFIED: ICD-10-CM

## 2020-02-13 PROCEDURE — 96413 CHEMO IV INFUSION 1 HR: CPT

## 2020-02-13 RX ORDER — PEMBROLIZUMAB 25 MG/ML
200 INJECTION, SOLUTION INTRAVENOUS ONCE
Refills: 0 | Status: COMPLETED | OUTPATIENT
Start: 2020-02-13 | End: 2020-02-13

## 2020-02-13 RX ORDER — SODIUM CHLORIDE 9 MG/ML
250 INJECTION INTRAMUSCULAR; INTRAVENOUS; SUBCUTANEOUS
Refills: 0 | Status: DISCONTINUED | OUTPATIENT
Start: 2020-02-13 | End: 2020-03-31

## 2020-02-13 RX ADMIN — PEMBROLIZUMAB 200 MILLIGRAM(S): 25 INJECTION, SOLUTION INTRAVENOUS at 11:21

## 2020-02-13 RX ADMIN — PEMBROLIZUMAB 216 MILLIGRAM(S): 25 INJECTION, SOLUTION INTRAVENOUS at 10:51

## 2020-02-13 RX ADMIN — SODIUM CHLORIDE 250 MILLILITER(S): 9 INJECTION INTRAMUSCULAR; INTRAVENOUS; SUBCUTANEOUS at 11:25

## 2020-02-13 RX ADMIN — SODIUM CHLORIDE 30 MILLILITER(S): 9 INJECTION INTRAMUSCULAR; INTRAVENOUS; SUBCUTANEOUS at 10:10

## 2020-02-23 ENCOUNTER — FORM ENCOUNTER (OUTPATIENT)
Age: 65
End: 2020-02-23

## 2020-02-24 ENCOUNTER — OUTPATIENT (OUTPATIENT)
Dept: OUTPATIENT SERVICES | Facility: HOSPITAL | Age: 65
LOS: 1 days | End: 2020-02-24
Payer: COMMERCIAL

## 2020-02-24 ENCOUNTER — APPOINTMENT (OUTPATIENT)
Dept: CT IMAGING | Facility: CLINIC | Age: 65
End: 2020-02-24
Payer: COMMERCIAL

## 2020-02-24 DIAGNOSIS — C54.1 MALIGNANT NEOPLASM OF ENDOMETRIUM: ICD-10-CM

## 2020-02-24 PROCEDURE — 82565 ASSAY OF CREATININE: CPT

## 2020-02-24 PROCEDURE — 74177 CT ABD & PELVIS W/CONTRAST: CPT

## 2020-02-24 PROCEDURE — 71260 CT THORAX DX C+: CPT | Mod: 26

## 2020-02-24 PROCEDURE — 74177 CT ABD & PELVIS W/CONTRAST: CPT | Mod: 26

## 2020-02-24 PROCEDURE — 71260 CT THORAX DX C+: CPT

## 2020-03-05 ENCOUNTER — OUTPATIENT (OUTPATIENT)
Dept: OUTPATIENT SERVICES | Facility: HOSPITAL | Age: 65
LOS: 1 days | End: 2020-03-05
Payer: COMMERCIAL

## 2020-03-05 VITALS
TEMPERATURE: 98 F | RESPIRATION RATE: 16 BRPM | WEIGHT: 123.02 LBS | SYSTOLIC BLOOD PRESSURE: 134 MMHG | HEIGHT: 60 IN | HEART RATE: 79 BPM | DIASTOLIC BLOOD PRESSURE: 70 MMHG

## 2020-03-05 DIAGNOSIS — C54.1 MALIGNANT NEOPLASM OF ENDOMETRIUM: ICD-10-CM

## 2020-03-05 DIAGNOSIS — R69 ILLNESS, UNSPECIFIED: ICD-10-CM

## 2020-03-05 PROCEDURE — 96413 CHEMO IV INFUSION 1 HR: CPT

## 2020-03-05 RX ORDER — SODIUM CHLORIDE 9 MG/ML
250 INJECTION INTRAMUSCULAR; INTRAVENOUS; SUBCUTANEOUS
Refills: 0 | Status: DISCONTINUED | OUTPATIENT
Start: 2020-03-05 | End: 2020-03-31

## 2020-03-05 RX ORDER — PREGABALIN 225 MG/1
1 CAPSULE ORAL
Qty: 0 | Refills: 0 | DISCHARGE

## 2020-03-05 RX ORDER — PEMBROLIZUMAB 25 MG/ML
200 INJECTION, SOLUTION INTRAVENOUS ONCE
Refills: 0 | Status: COMPLETED | OUTPATIENT
Start: 2020-03-05 | End: 2020-03-05

## 2020-03-05 RX ADMIN — SODIUM CHLORIDE 250 MILLILITER(S): 9 INJECTION INTRAMUSCULAR; INTRAVENOUS; SUBCUTANEOUS at 11:01

## 2020-03-05 RX ADMIN — PEMBROLIZUMAB 216 MILLIGRAM(S): 25 INJECTION, SOLUTION INTRAVENOUS at 10:18

## 2020-03-05 RX ADMIN — PEMBROLIZUMAB 200 MILLIGRAM(S): 25 INJECTION, SOLUTION INTRAVENOUS at 10:30

## 2020-03-05 RX ADMIN — SODIUM CHLORIDE 30 MILLILITER(S): 9 INJECTION INTRAMUSCULAR; INTRAVENOUS; SUBCUTANEOUS at 10:21

## 2020-03-26 ENCOUNTER — OUTPATIENT (OUTPATIENT)
Dept: OUTPATIENT SERVICES | Facility: HOSPITAL | Age: 65
LOS: 1 days | End: 2020-03-26
Payer: COMMERCIAL

## 2020-03-26 VITALS
HEART RATE: 97 BPM | TEMPERATURE: 99 F | WEIGHT: 120.15 LBS | SYSTOLIC BLOOD PRESSURE: 149 MMHG | DIASTOLIC BLOOD PRESSURE: 82 MMHG

## 2020-03-26 VITALS — HEART RATE: 88 BPM | DIASTOLIC BLOOD PRESSURE: 78 MMHG | SYSTOLIC BLOOD PRESSURE: 142 MMHG

## 2020-03-26 LAB
ALBUMIN SERPL ELPH-MCNC: 4.1 G/DL — SIGNIFICANT CHANGE UP (ref 3.3–5)
ALP SERPL-CCNC: 79 U/L — SIGNIFICANT CHANGE UP (ref 40–120)
ALT FLD-CCNC: 17 U/L — SIGNIFICANT CHANGE UP (ref 12–78)
ANION GAP SERPL CALC-SCNC: 6 MMOL/L — SIGNIFICANT CHANGE UP (ref 5–17)
AST SERPL-CCNC: 11 U/L — LOW (ref 15–37)
BASOPHILS # BLD AUTO: 0.03 K/UL — SIGNIFICANT CHANGE UP (ref 0–0.2)
BASOPHILS NFR BLD AUTO: 0.5 % — SIGNIFICANT CHANGE UP (ref 0–2)
BILIRUB SERPL-MCNC: 0.6 MG/DL — SIGNIFICANT CHANGE UP (ref 0.2–1.2)
BUN SERPL-MCNC: 13 MG/DL — SIGNIFICANT CHANGE UP (ref 7–23)
CALCIUM SERPL-MCNC: 8.9 MG/DL — SIGNIFICANT CHANGE UP (ref 8.5–10.1)
CHLORIDE SERPL-SCNC: 104 MMOL/L — SIGNIFICANT CHANGE UP (ref 96–108)
CO2 SERPL-SCNC: 26 MMOL/L — SIGNIFICANT CHANGE UP (ref 22–31)
CREAT SERPL-MCNC: 0.7 MG/DL — SIGNIFICANT CHANGE UP (ref 0.5–1.3)
EOSINOPHIL # BLD AUTO: 0.04 K/UL — SIGNIFICANT CHANGE UP (ref 0–0.5)
EOSINOPHIL NFR BLD AUTO: 0.6 % — SIGNIFICANT CHANGE UP (ref 0–6)
GLUCOSE SERPL-MCNC: 285 MG/DL — HIGH (ref 70–99)
HCT VFR BLD CALC: 39.7 % — SIGNIFICANT CHANGE UP (ref 34.5–45)
HGB BLD-MCNC: 13.2 G/DL — SIGNIFICANT CHANGE UP (ref 11.5–15.5)
IMM GRANULOCYTES NFR BLD AUTO: 0.2 % — SIGNIFICANT CHANGE UP (ref 0–1.5)
LYMPHOCYTES # BLD AUTO: 1.15 K/UL — SIGNIFICANT CHANGE UP (ref 1–3.3)
LYMPHOCYTES # BLD AUTO: 17.6 % — SIGNIFICANT CHANGE UP (ref 13–44)
MCHC RBC-ENTMCNC: 31.7 PG — SIGNIFICANT CHANGE UP (ref 27–34)
MCHC RBC-ENTMCNC: 33.2 GM/DL — SIGNIFICANT CHANGE UP (ref 32–36)
MCV RBC AUTO: 95.4 FL — SIGNIFICANT CHANGE UP (ref 80–100)
MONOCYTES # BLD AUTO: 0.32 K/UL — SIGNIFICANT CHANGE UP (ref 0–0.9)
MONOCYTES NFR BLD AUTO: 4.9 % — SIGNIFICANT CHANGE UP (ref 2–14)
NEUTROPHILS # BLD AUTO: 5 K/UL — SIGNIFICANT CHANGE UP (ref 1.8–7.4)
NEUTROPHILS NFR BLD AUTO: 76.2 % — SIGNIFICANT CHANGE UP (ref 43–77)
PLATELET # BLD AUTO: 379 K/UL — SIGNIFICANT CHANGE UP (ref 150–400)
POTASSIUM SERPL-MCNC: 4.1 MMOL/L — SIGNIFICANT CHANGE UP (ref 3.5–5.3)
POTASSIUM SERPL-SCNC: 4.1 MMOL/L — SIGNIFICANT CHANGE UP (ref 3.5–5.3)
PROT SERPL-MCNC: 7.3 GM/DL — SIGNIFICANT CHANGE UP (ref 6–8.3)
RBC # BLD: 4.16 M/UL — SIGNIFICANT CHANGE UP (ref 3.8–5.2)
RBC # FLD: 12.9 % — SIGNIFICANT CHANGE UP (ref 10.3–14.5)
SODIUM SERPL-SCNC: 136 MMOL/L — SIGNIFICANT CHANGE UP (ref 135–145)
T4 FREE SERPL-MCNC: 1.13 NG/DL — SIGNIFICANT CHANGE UP (ref 0.76–1.46)
TSH SERPL-MCNC: 0.76 UU/ML — SIGNIFICANT CHANGE UP (ref 0.34–4.82)
WBC # BLD: 6.55 K/UL — SIGNIFICANT CHANGE UP (ref 3.8–10.5)
WBC # FLD AUTO: 6.55 K/UL — SIGNIFICANT CHANGE UP (ref 3.8–10.5)

## 2020-03-26 PROCEDURE — 84439 ASSAY OF FREE THYROXINE: CPT

## 2020-03-26 PROCEDURE — 36415 COLL VENOUS BLD VENIPUNCTURE: CPT

## 2020-03-26 PROCEDURE — 84443 ASSAY THYROID STIM HORMONE: CPT

## 2020-03-26 PROCEDURE — 85025 COMPLETE CBC W/AUTO DIFF WBC: CPT

## 2020-03-26 PROCEDURE — 86304 IMMUNOASSAY TUMOR CA 125: CPT

## 2020-03-26 PROCEDURE — 80053 COMPREHEN METABOLIC PANEL: CPT

## 2020-03-26 PROCEDURE — 96413 CHEMO IV INFUSION 1 HR: CPT

## 2020-03-26 RX ORDER — PEMBROLIZUMAB 25 MG/ML
200 INJECTION, SOLUTION INTRAVENOUS ONCE
Refills: 0 | Status: COMPLETED | OUTPATIENT
Start: 2020-03-26 | End: 2020-03-26

## 2020-03-26 RX ORDER — SODIUM CHLORIDE 9 MG/ML
250 INJECTION INTRAMUSCULAR; INTRAVENOUS; SUBCUTANEOUS
Refills: 0 | Status: DISCONTINUED | OUTPATIENT
Start: 2020-03-26 | End: 2020-03-31

## 2020-03-26 RX ADMIN — SODIUM CHLORIDE 30 MILLILITER(S): 9 INJECTION INTRAMUSCULAR; INTRAVENOUS; SUBCUTANEOUS at 10:05

## 2020-03-26 RX ADMIN — PEMBROLIZUMAB 200 MILLIGRAM(S): 25 INJECTION, SOLUTION INTRAVENOUS at 10:56

## 2020-03-26 RX ADMIN — PEMBROLIZUMAB 216 MILLIGRAM(S): 25 INJECTION, SOLUTION INTRAVENOUS at 10:25

## 2020-03-26 RX ADMIN — SODIUM CHLORIDE 250 MILLILITER(S): 9 INJECTION INTRAMUSCULAR; INTRAVENOUS; SUBCUTANEOUS at 11:03

## 2020-03-27 DIAGNOSIS — R69 ILLNESS, UNSPECIFIED: ICD-10-CM

## 2020-03-27 DIAGNOSIS — C54.1 MALIGNANT NEOPLASM OF ENDOMETRIUM: ICD-10-CM

## 2020-04-09 ENCOUNTER — OUTPATIENT (OUTPATIENT)
Dept: OUTPATIENT SERVICES | Facility: HOSPITAL | Age: 65
LOS: 1 days | Discharge: HOME | End: 2020-04-09

## 2020-04-09 DIAGNOSIS — C54.1 MALIGNANT NEOPLASM OF ENDOMETRIUM: ICD-10-CM

## 2020-04-16 ENCOUNTER — APPOINTMENT (OUTPATIENT)
Age: 65
End: 2020-04-16
Payer: COMMERCIAL

## 2020-04-16 VITALS
BODY MASS INDEX: 23.44 KG/M2 | SYSTOLIC BLOOD PRESSURE: 149 MMHG | HEART RATE: 111 BPM | TEMPERATURE: 98 F | WEIGHT: 120 LBS | DIASTOLIC BLOOD PRESSURE: 83 MMHG

## 2020-04-16 VITALS
WEIGHT: 120 LBS | SYSTOLIC BLOOD PRESSURE: 149 MMHG | HEIGHT: 60 IN | DIASTOLIC BLOOD PRESSURE: 83 MMHG | BODY MASS INDEX: 23.56 KG/M2

## 2020-04-16 PROCEDURE — 99215 OFFICE O/P EST HI 40 MIN: CPT

## 2020-04-16 NOTE — REVIEW OF SYSTEMS
[Dysmenorrhea/Abn Vaginal Bleeding] : dysmenorrhea/abnormal vaginal bleeding [Insomnia] : insomnia [Anxiety] : anxiety [Negative] : Heme/Lymph [Chest Pain] : no chest pain [Lower Ext Edema] : no lower extremity edema [Shortness Of Breath] : no shortness of breath [Cough] : no cough [Abdominal Pain] : no abdominal pain [Vomiting] : no vomiting [Skin Rash] : no skin rash [Easy Bleeding] : no tendency for easy bleeding [Easy Bruising] : no tendency for easy bruising [de-identified] : bilateral hands stiffness and sensory paresthesias.

## 2020-04-16 NOTE — ASSESSMENT
[FreeTextEntry1] : RTC in 3 weeks; Ms. TIM was advised to contact the office should she have any additional questions or concerns in the interim.\par \par \par \par \par \par \par

## 2020-04-16 NOTE — HISTORY OF PRESENT ILLNESS
[Disease: _____________________] : Disease: [unfilled] [AJCC Stage: ____] : AJCC Stage: [unfilled] [de-identified] : 64 years old postmenopausal  female , with stage III C poorly differentiated, endometrioid carcinoma of the uterus diagnosed in 2018.\par \par CASE SYNOPSIS:\par \par 2018:\par Cervical mass biopsy – poorly differentiated carcinoma, favoring endometrial origin.\par \par 2018:\par Pelvic US (performed at [facility]): uterus size 9.9 x 5.3x 4.4 cm, endometrial mucosa thickness 24 mm, cervix with lobular appearance and heterogenous echo texture. Adnexae unremarkable, but a heterogenous solid  mass, measuring 3.8 x 3.6 x 2.8 cm, is described adjacent to the right ovary.\par \par 2018:\par CT C/A/P: No evidence of distant metastases; significant distension endometrial cavity (16 mm in size), extending into enlarged bulky cervix, with questionable proximal vaginal extension. Significant, diffuse retroperitoneal and pelvic lymphadenopathy ( lymph nodes in right pelvis abutting the right ovary, as seen on pelvis ultrasound.\par \par 2018:\par Pathology review [facility]: poorly differentiated, endometrioid adenocarcinoma; IHC non- contributory; cannot distinguished cervical vs endometrial origin.\par \par 2018:\par Started  Taxol/Carbo ( initially Q 21, switched to weekly schedule due to significant skin rash development after 1st dose).\par \par 2019: \par Foundation One study consistent with MSI- H, TBM- high status, no other actionable target gene alterations.\par \par 2019: continues cycle #4 chemotherapy with Taxol/ Carboplatin.\par   dropped from 269 (18) to 42 (19). \par \par 2019:\par PET/CT - decrease in FDG avidity in both the endometrial cavity, and in the retroperitoneal and abdominopelvic lymphadenopathy. Unchanged size  of bilateral retrocrural LAD.\par Multiple FDG avid left axillary and retropectoral lymph nodes (? new)- for which FNA recommended.\par Unchanged, minimally changed FDG avid nodular opacity RML.\par \par 19:\par Mammography: Area of architectural distortion central left breast, correlating with US -described lobulated hypoechoic mass left breast 12:00, 5 cm from the nipple.\par Breast ultrasound:\par Left breast 12:00 5 cm from the nipple lobulated hypoechoic mass with associated architectural distortion. Multiple abnormal left axillary lymph nodes with focal cortical thickening. Ultrasound-guided core biopsy recommended for both breast mass and axillary lymph node. \par A 12:00 retroareolar 0.4 cm intraductal mass, for which ultrasound guided core biopsy recommended.\par \par 2019:\par Patient developed rash on upper extremities after D1, cycle #6 Carboplatin; chemotherapy discontinued.\par \par 2019:\par US –guided left breast biopsy – pathology breast consistent with intraductal papilloma left breast with ductal hyperplasia, adenosis, apocrine metaplasia. \par Left axillary LN biopsy – metastatic adenocarcinoma with papillary features, compatible with metastatic disease from known endometrial primary.\par \par 19- started Pembrolizumab.\par  \par 19-  dropped from 163 to 28.\par \par 10/1/19- admitted at [facility] with KATHRYN and DKA; diagnosed with type I diabetes, and started insulin sliding scale.\par \par 10/1/19: CT C/A/P-overall improvement in disease burden in the uterus and lymph nodes compared to 18. \par Persistent 1.2 cm nodular opacity along the right major fissure. No acute chest pathology, no PE.\par \par 19: -   25 U/mL.\par \par 2020 CT C/A/P-progression of endometrioid carcinoma of the uterus with increasing endometrial thickness and increasing soft tissue.  There now appears to be invasion of the myometrium.  Increasing pelvic lymphadenopathy.  Stable nodular opacity along the right major fissure. [de-identified] : poorly differentiated, endometrioid [de-identified] :  [FreeTextEntry1] : neoadjuvant Taxol/ Carboplatin \par Pembrolizumab [de-identified] : Ms. TIM is here for follow up after recent tumoral marker  increased to 44 in January 2020. That prompted a restaging CT C/A/P ( 2/24/20) which indicated progression of endometrioid carcinoma of the uterus with increasing endometrial thickness and increasing soft tissue (appears to invade myometrium) and increasing pelvic lymphadenopathy.  Her last dose of Pembrolizumab  systemic immunotherapy with Pembrolizumab ( completed cycle 16# on 3/26/20 ). Tolerating treatment well, with no significant inflammatory . Continues to comply with insulin regimen; her serum glucose levels are between and. Hematologic values remain within normal. Most recent  has actually decreased to 20. In the interim case reviewed with Dr. Alvarez (GYN oncology and Dr. Tapia ( RT oncology).  Patient did not have radiation to the pelvis since diagnosis, due to presence of retroperitoneal/periaortic lymphadenopathy.  Currently her disease is progressing mostly in the pelvis according to recent CT.

## 2020-04-16 NOTE — RESULTS/DATA
[FreeTextEntry1] : Reviewed recent BW results with patient today.\par \par 3/26/20:\par WBC 6.55 K; hemoglobin  13.2 g/dL; hematocrit 39.7 %, platelet 379,000\par : 20\par \par 1/ 23/ 20:\par WBC 8.08 K; hemoglobin 12.8 g/dL; hematocrit 38.8 %, platelet 299, 000\par : 44\par \par 12/12/19:\par WBC 7.36, hemoglobin 13.5 g/dL, hematocrit 40.5%, platelet 340,000.\par \par 10/11/19:\par WBC 7.9, hemoglobin 12.7 g/dL, hematocrit 30.7%, platelet 470,000\par : 24\par Sodium 136, potassium 4.1, BUN 12, creatinine 0.76, glucose 225\par SGOT 26, SGPT 58,\par EGFR: 83 \par \par

## 2020-04-17 DIAGNOSIS — Z51.11 ENCOUNTER FOR ANTINEOPLASTIC CHEMOTHERAPY: ICD-10-CM

## 2020-04-21 ENCOUNTER — APPOINTMENT (OUTPATIENT)
Dept: GYNECOLOGIC ONCOLOGY | Facility: CLINIC | Age: 65
End: 2020-04-21

## 2020-04-28 ENCOUNTER — APPOINTMENT (OUTPATIENT)
Dept: CT IMAGING | Facility: CLINIC | Age: 65
End: 2020-04-28
Payer: COMMERCIAL

## 2020-04-28 ENCOUNTER — OUTPATIENT (OUTPATIENT)
Dept: OUTPATIENT SERVICES | Facility: HOSPITAL | Age: 65
LOS: 1 days | End: 2020-04-28
Payer: COMMERCIAL

## 2020-04-28 DIAGNOSIS — C54.1 MALIGNANT NEOPLASM OF ENDOMETRIUM: ICD-10-CM

## 2020-04-28 PROCEDURE — 74177 CT ABD & PELVIS W/CONTRAST: CPT

## 2020-04-28 PROCEDURE — 74177 CT ABD & PELVIS W/CONTRAST: CPT | Mod: 26

## 2020-04-28 PROCEDURE — 82565 ASSAY OF CREATININE: CPT

## 2020-05-07 ENCOUNTER — APPOINTMENT (OUTPATIENT)
Age: 65
End: 2020-05-07

## 2020-05-21 ENCOUNTER — OUTPATIENT (OUTPATIENT)
Dept: OUTPATIENT SERVICES | Facility: HOSPITAL | Age: 65
LOS: 1 days | Discharge: HOME | End: 2020-05-21
Payer: COMMERCIAL

## 2020-05-21 DIAGNOSIS — C54.1 MALIGNANT NEOPLASM OF ENDOMETRIUM: ICD-10-CM

## 2020-05-28 ENCOUNTER — RESULT REVIEW (OUTPATIENT)
Age: 65
End: 2020-05-28

## 2020-05-28 ENCOUNTER — APPOINTMENT (OUTPATIENT)
Age: 65
End: 2020-05-28

## 2020-05-28 VITALS
TEMPERATURE: 97.9 F | DIASTOLIC BLOOD PRESSURE: 76 MMHG | HEIGHT: 60 IN | HEART RATE: 112 BPM | RESPIRATION RATE: 16 BRPM | SYSTOLIC BLOOD PRESSURE: 124 MMHG | WEIGHT: 119 LBS | BODY MASS INDEX: 23.36 KG/M2

## 2020-05-28 LAB
ALBUMIN SERPL ELPH-MCNC: 3.4 G/DL — SIGNIFICANT CHANGE UP (ref 3.3–5)
ALP SERPL-CCNC: 60 U/L — SIGNIFICANT CHANGE UP (ref 40–120)
ALT FLD-CCNC: 11 U/L — SIGNIFICANT CHANGE UP (ref 10–45)
ANION GAP SERPL CALC-SCNC: 13 MMOL/L — SIGNIFICANT CHANGE UP (ref 5–17)
AST SERPL-CCNC: 13 U/L — SIGNIFICANT CHANGE UP (ref 10–40)
BASOPHILS # BLD AUTO: 0.02 K/UL — SIGNIFICANT CHANGE UP (ref 0–0.2)
BASOPHILS NFR BLD AUTO: 0.4 % — SIGNIFICANT CHANGE UP (ref 0–2)
BILIRUB SERPL-MCNC: 0.6 MG/DL — SIGNIFICANT CHANGE UP (ref 0.2–1.2)
BUN SERPL-MCNC: 13 MG/DL — SIGNIFICANT CHANGE UP (ref 7–23)
CALCIUM SERPL-MCNC: 7.9 MG/DL — LOW (ref 8.4–10.5)
CANCER AG125 SERPL-ACNC: 20 U/ML — SIGNIFICANT CHANGE UP
CHLORIDE SERPL-SCNC: 105 MMOL/L — SIGNIFICANT CHANGE UP (ref 96–108)
CO2 SERPL-SCNC: 20 MMOL/L — LOW (ref 22–31)
CREAT SERPL-MCNC: 0.54 MG/DL — SIGNIFICANT CHANGE UP (ref 0.5–1.3)
EOSINOPHIL # BLD AUTO: 0.17 K/UL — SIGNIFICANT CHANGE UP (ref 0–0.5)
EOSINOPHIL NFR BLD AUTO: 3.3 % — SIGNIFICANT CHANGE UP (ref 0–6)
GLUCOSE SERPL-MCNC: 320 MG/DL — HIGH (ref 70–99)
HCT VFR BLD CALC: 35.6 % — SIGNIFICANT CHANGE UP (ref 34.5–45)
HGB BLD-MCNC: 11.7 G/DL — SIGNIFICANT CHANGE UP (ref 11.5–15.5)
IMM GRANULOCYTES NFR BLD AUTO: 0.4 % — SIGNIFICANT CHANGE UP (ref 0–1.5)
LYMPHOCYTES # BLD AUTO: 0.34 K/UL — LOW (ref 1–3.3)
LYMPHOCYTES # BLD AUTO: 6.7 % — LOW (ref 13–44)
MCHC RBC-ENTMCNC: 32 PG — SIGNIFICANT CHANGE UP (ref 27–34)
MCHC RBC-ENTMCNC: 32.9 GM/DL — SIGNIFICANT CHANGE UP (ref 32–36)
MCV RBC AUTO: 97.3 FL — SIGNIFICANT CHANGE UP (ref 80–100)
MONOCYTES # BLD AUTO: 0.34 K/UL — SIGNIFICANT CHANGE UP (ref 0–0.9)
MONOCYTES NFR BLD AUTO: 6.7 % — SIGNIFICANT CHANGE UP (ref 2–14)
NEUTROPHILS # BLD AUTO: 4.19 K/UL — SIGNIFICANT CHANGE UP (ref 1.8–7.4)
NEUTROPHILS NFR BLD AUTO: 82.5 % — HIGH (ref 43–77)
NRBC # BLD: 0 /100 WBCS — SIGNIFICANT CHANGE UP (ref 0–0)
PLATELET # BLD AUTO: 247 K/UL — SIGNIFICANT CHANGE UP (ref 150–400)
POTASSIUM SERPL-MCNC: 4 MMOL/L — SIGNIFICANT CHANGE UP (ref 3.5–5.3)
POTASSIUM SERPL-SCNC: 4 MMOL/L — SIGNIFICANT CHANGE UP (ref 3.5–5.3)
PROT SERPL-MCNC: 5.2 G/DL — LOW (ref 6–8.3)
RBC # BLD: 3.66 M/UL — LOW (ref 3.8–5.2)
RBC # FLD: 12.6 % — SIGNIFICANT CHANGE UP (ref 10.3–14.5)
SODIUM SERPL-SCNC: 138 MMOL/L — SIGNIFICANT CHANGE UP (ref 135–145)
TSH SERPL-MCNC: 0.43 UIU/ML — SIGNIFICANT CHANGE UP (ref 0.27–4.2)
WBC # BLD: 5.08 K/UL — SIGNIFICANT CHANGE UP (ref 3.8–10.5)
WBC # FLD AUTO: 5.08 K/UL — SIGNIFICANT CHANGE UP (ref 3.8–10.5)

## 2020-05-29 DIAGNOSIS — Z51.11 ENCOUNTER FOR ANTINEOPLASTIC CHEMOTHERAPY: ICD-10-CM

## 2020-06-02 ENCOUNTER — APPOINTMENT (OUTPATIENT)
Age: 65
End: 2020-06-02
Payer: COMMERCIAL

## 2020-06-02 VITALS
SYSTOLIC BLOOD PRESSURE: 130 MMHG | DIASTOLIC BLOOD PRESSURE: 70 MMHG | BODY MASS INDEX: 23.32 KG/M2 | TEMPERATURE: 98.6 F | WEIGHT: 119.4 LBS

## 2020-06-02 DIAGNOSIS — Z92.3 PERSONAL HISTORY OF IRRADIATION: ICD-10-CM

## 2020-06-02 PROCEDURE — 93010 ELECTROCARDIOGRAM REPORT: CPT

## 2020-06-02 PROCEDURE — 99215 OFFICE O/P EST HI 40 MIN: CPT

## 2020-06-18 ENCOUNTER — APPOINTMENT (OUTPATIENT)
Age: 65
End: 2020-06-18

## 2020-06-18 ENCOUNTER — OUTPATIENT (OUTPATIENT)
Dept: OUTPATIENT SERVICES | Facility: HOSPITAL | Age: 65
LOS: 1 days | Discharge: HOME | End: 2020-06-18

## 2020-06-18 DIAGNOSIS — C54.1 MALIGNANT NEOPLASM OF ENDOMETRIUM: ICD-10-CM

## 2020-06-23 ENCOUNTER — APPOINTMENT (OUTPATIENT)
Age: 65
End: 2020-06-23

## 2020-06-23 ENCOUNTER — RESULT REVIEW (OUTPATIENT)
Age: 65
End: 2020-06-23

## 2020-06-23 VITALS
HEIGHT: 60 IN | BODY MASS INDEX: 23.16 KG/M2 | DIASTOLIC BLOOD PRESSURE: 82 MMHG | TEMPERATURE: 97.8 F | RESPIRATION RATE: 16 BRPM | WEIGHT: 118 LBS | SYSTOLIC BLOOD PRESSURE: 145 MMHG | HEART RATE: 94 BPM

## 2020-06-23 RX ORDER — ERGOCALCIFEROL 1.25 MG/1
1 CAPSULE ORAL
Qty: 0 | Refills: 0 | DISCHARGE

## 2020-06-24 DIAGNOSIS — R11.2 NAUSEA WITH VOMITING, UNSPECIFIED: ICD-10-CM

## 2020-06-24 DIAGNOSIS — Z51.11 ENCOUNTER FOR ANTINEOPLASTIC CHEMOTHERAPY: ICD-10-CM

## 2020-06-26 ENCOUNTER — LABORATORY RESULT (OUTPATIENT)
Age: 65
End: 2020-06-26

## 2020-06-30 ENCOUNTER — RESULT REVIEW (OUTPATIENT)
Age: 65
End: 2020-06-30

## 2020-06-30 ENCOUNTER — APPOINTMENT (OUTPATIENT)
Age: 65
End: 2020-06-30

## 2020-06-30 ENCOUNTER — APPOINTMENT (OUTPATIENT)
Dept: GYNECOLOGIC ONCOLOGY | Facility: CLINIC | Age: 65
End: 2020-06-30

## 2020-06-30 VITALS
RESPIRATION RATE: 16 BRPM | WEIGHT: 118 LBS | SYSTOLIC BLOOD PRESSURE: 126 MMHG | TEMPERATURE: 96.7 F | DIASTOLIC BLOOD PRESSURE: 73 MMHG | HEART RATE: 125 BPM | BODY MASS INDEX: 23.16 KG/M2 | HEIGHT: 60 IN

## 2020-06-30 LAB
BASOPHILS # BLD AUTO: 0.02 K/UL — SIGNIFICANT CHANGE UP (ref 0–0.2)
BASOPHILS NFR BLD AUTO: 0.6 % — SIGNIFICANT CHANGE UP (ref 0–2)
EOSINOPHIL # BLD AUTO: 0.24 K/UL — SIGNIFICANT CHANGE UP (ref 0–0.5)
EOSINOPHIL NFR BLD AUTO: 6.6 % — HIGH (ref 0–6)
HCT VFR BLD CALC: 37.6 % — SIGNIFICANT CHANGE UP (ref 34.5–45)
HGB BLD-MCNC: 12.6 G/DL — SIGNIFICANT CHANGE UP (ref 11.5–15.5)
IMM GRANULOCYTES NFR BLD AUTO: 0.6 % — SIGNIFICANT CHANGE UP (ref 0–1.5)
LYMPHOCYTES # BLD AUTO: 0.28 K/UL — LOW (ref 1–3.3)
LYMPHOCYTES # BLD AUTO: 7.7 % — LOW (ref 13–44)
MCHC RBC-ENTMCNC: 32.4 PG — SIGNIFICANT CHANGE UP (ref 27–34)
MCHC RBC-ENTMCNC: 33.5 GM/DL — SIGNIFICANT CHANGE UP (ref 32–36)
MCV RBC AUTO: 96.7 FL — SIGNIFICANT CHANGE UP (ref 80–100)
MONOCYTES # BLD AUTO: 0.24 K/UL — SIGNIFICANT CHANGE UP (ref 0–0.9)
MONOCYTES NFR BLD AUTO: 6.6 % — SIGNIFICANT CHANGE UP (ref 2–14)
NEUTROPHILS # BLD AUTO: 2.82 K/UL — SIGNIFICANT CHANGE UP (ref 1.8–7.4)
NEUTROPHILS NFR BLD AUTO: 77.9 % — HIGH (ref 43–77)
NRBC # BLD: 0 /100 WBCS — SIGNIFICANT CHANGE UP (ref 0–0)
PLATELET # BLD AUTO: 353 K/UL — SIGNIFICANT CHANGE UP (ref 150–400)
RBC # BLD: 3.89 M/UL — SIGNIFICANT CHANGE UP (ref 3.8–5.2)
RBC # FLD: 13.2 % — SIGNIFICANT CHANGE UP (ref 10.3–14.5)
WBC # BLD: 3.62 K/UL — LOW (ref 3.8–10.5)
WBC # FLD AUTO: 3.62 K/UL — LOW (ref 3.8–10.5)

## 2020-07-07 ENCOUNTER — RESULT REVIEW (OUTPATIENT)
Age: 65
End: 2020-07-07

## 2020-07-07 ENCOUNTER — APPOINTMENT (OUTPATIENT)
Age: 65
End: 2020-07-07

## 2020-07-07 VITALS
WEIGHT: 119 LBS | HEART RATE: 91 BPM | TEMPERATURE: 97.3 F | DIASTOLIC BLOOD PRESSURE: 75 MMHG | SYSTOLIC BLOOD PRESSURE: 126 MMHG | RESPIRATION RATE: 16 BRPM | HEIGHT: 60 IN | BODY MASS INDEX: 23.36 KG/M2

## 2020-07-07 LAB
ANISOCYTOSIS BLD QL: SLIGHT — SIGNIFICANT CHANGE UP
BASOPHILS # BLD AUTO: 0.02 K/UL — SIGNIFICANT CHANGE UP (ref 0–0.2)
BASOPHILS NFR BLD AUTO: 1.2 % — SIGNIFICANT CHANGE UP (ref 0–2)
EOSINOPHIL # BLD AUTO: 0.06 K/UL — SIGNIFICANT CHANGE UP (ref 0–0.5)
EOSINOPHIL NFR BLD AUTO: 3.5 % — SIGNIFICANT CHANGE UP (ref 0–6)
HCT VFR BLD CALC: 33.9 % — LOW (ref 34.5–45)
HGB BLD-MCNC: 11.6 G/DL — SIGNIFICANT CHANGE UP (ref 11.5–15.5)
IMM GRANULOCYTES NFR BLD AUTO: 1.7 % — HIGH (ref 0–1.5)
LYMPHOCYTES # BLD AUTO: 0.25 K/UL — LOW (ref 1–3.3)
LYMPHOCYTES # BLD AUTO: 14.5 % — SIGNIFICANT CHANGE UP (ref 13–44)
MACROCYTES BLD QL: SLIGHT — SIGNIFICANT CHANGE UP
MCHC RBC-ENTMCNC: 33.4 PG — SIGNIFICANT CHANGE UP (ref 27–34)
MCHC RBC-ENTMCNC: 34.2 GM/DL — SIGNIFICANT CHANGE UP (ref 32–36)
MCV RBC AUTO: 97.7 FL — SIGNIFICANT CHANGE UP (ref 80–100)
MICROCYTES BLD QL: SLIGHT — SIGNIFICANT CHANGE UP
MONOCYTES # BLD AUTO: 0.22 K/UL — SIGNIFICANT CHANGE UP (ref 0–0.9)
MONOCYTES NFR BLD AUTO: 12.8 % — SIGNIFICANT CHANGE UP (ref 2–14)
NEUTROPHILS # BLD AUTO: 1.14 K/UL — LOW (ref 1.8–7.4)
NEUTROPHILS NFR BLD AUTO: 66.3 % — SIGNIFICANT CHANGE UP (ref 43–77)
NRBC # BLD: 0 /100 WBCS — SIGNIFICANT CHANGE UP (ref 0–0)
PLAT MORPH BLD: NORMAL — SIGNIFICANT CHANGE UP
PLATELET # BLD AUTO: 369 K/UL — SIGNIFICANT CHANGE UP (ref 150–400)
RBC # BLD: 3.47 M/UL — LOW (ref 3.8–5.2)
RBC # FLD: 13.5 % — SIGNIFICANT CHANGE UP (ref 10.3–14.5)
RBC BLD AUTO: ABNORMAL
WBC # BLD: 1.72 K/UL — LOW (ref 3.8–10.5)
WBC # FLD AUTO: 1.72 K/UL — LOW (ref 3.8–10.5)

## 2020-07-08 ENCOUNTER — APPOINTMENT (OUTPATIENT)
Age: 65
End: 2020-07-08

## 2020-07-09 DIAGNOSIS — Z51.89 ENCOUNTER FOR OTHER SPECIFIED AFTERCARE: ICD-10-CM

## 2020-07-16 ENCOUNTER — OUTPATIENT (OUTPATIENT)
Dept: OUTPATIENT SERVICES | Facility: HOSPITAL | Age: 65
LOS: 1 days | Discharge: HOME | End: 2020-07-16

## 2020-07-16 DIAGNOSIS — C54.1 MALIGNANT NEOPLASM OF ENDOMETRIUM: ICD-10-CM

## 2020-07-18 ENCOUNTER — LABORATORY RESULT (OUTPATIENT)
Age: 65
End: 2020-07-18

## 2020-07-21 ENCOUNTER — RESULT REVIEW (OUTPATIENT)
Age: 65
End: 2020-07-21

## 2020-07-21 ENCOUNTER — APPOINTMENT (OUTPATIENT)
Age: 65
End: 2020-07-21
Payer: COMMERCIAL

## 2020-07-21 VITALS
BODY MASS INDEX: 22.85 KG/M2 | TEMPERATURE: 98.1 F | SYSTOLIC BLOOD PRESSURE: 145 MMHG | DIASTOLIC BLOOD PRESSURE: 79 MMHG | HEART RATE: 121 BPM | WEIGHT: 117 LBS

## 2020-07-21 DIAGNOSIS — E53.8 DEFICIENCY OF OTHER SPECIFIED B GROUP VITAMINS: ICD-10-CM

## 2020-07-21 DIAGNOSIS — E55.9 VITAMIN D DEFICIENCY, UNSPECIFIED: ICD-10-CM

## 2020-07-21 PROCEDURE — 99213 OFFICE O/P EST LOW 20 MIN: CPT

## 2020-07-21 NOTE — RESULTS/DATA
[FreeTextEntry1] : WBC: 10.58   ANC: 9.07     Hgb: 13.0   Hct: 39.0    MCV: 96.8     Plts: 274\par CMP, Ca125, VitD: pending

## 2020-07-21 NOTE — REVIEW OF SYSTEMS
[Patient Intake Form Reviewed] : Patient intake form was reviewed [Anxiety] : anxiety [Negative] : Allergic/Immunologic [de-identified] : loosing hair in clumps now

## 2020-07-21 NOTE — HISTORY OF PRESENT ILLNESS
[Disease: _____________________] : Disease: [unfilled] [T: ___] : T[unfilled] [N: ___] : N[unfilled] [M: ___] : M[unfilled] [AJCC Stage: ____] : AJCC Stage: [unfilled] [de-identified] : STACEY TIM is a 64 y.o. who we are following for a stage IV endometrial cancer (axillary, retropectoral LN's)\par 11/7/18 -The patient presented with a 4 - 5 month hx of PMB and saw GYN Dr. Montes De Oca (prior GYN visit >9 years ago). Exam showed large cervical mass, biopsy revealed a poorly differentiated carcinoma favoring endometrial origin.   [facility] review of slides favored endometrioid adenocarcinoma, but could not distinguish between endometrial or cervical carcinoma. \par [facility] - MSI-H, TMB 34 (high), else no reportable alterations. Multiple other alterations - see report/below. \par 11/16/18 - CT C/A/P - No distant mets; significant distention endometrial cavity (16mm) extending into an enlarged bulky cervix, with questionable proximal vaginal extension.  Significant, diffuse retroperitoneal and pelvic LAD (LN's in right pelvis abutting the right ovary).\par 12/6/18 - 4/18/19 - Started Taxol/Carbo.  Initially given Q21, then weekly.  Patient then had a Carbo reaction D1 C6. Chemo held.  \par Responded well - Ca125 on 11/29/18 was 269 - jeremiah was 42 on 2/22/19.\par 3/30/19 - PET/CT - decrease in FDG avidity in both the endometrial cavity and in the retroperitoneal and abdominopelvic LAD. Unchanged size of bilateral retrocrural LAD. Unchanged, minimally changed FDG avid nodular opacity RML.  Multiple FDG avid left axillary and retropectoral lymph nodes (unchanged in size when c/w 11/16/18).\par Axillary lymphadenopathy felt to be unusual and so this was evaluated.  \par 4/12/19: Mammo/Sono:  ~ 1.1cm left breast mass at 12:00 and  0.4 cm left breast retroareolar intraductal mass.  Multiple abnormal left axillary LN's with focal cortical thickening.  \par 4/29/19: Left breast biopsies – benign.  Left axillary LN biopsy - metastatic adenocarcinoma with papillary features, compatible with metastatic disease from known endometrial primary.\par 5/16/19 - Started on Keytruda.  Ca125 was 163 at initiation, quickly normalized.  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  CT C/A/P - overall improvement in disease burden in uterus and LN's when c/w 11/16/18.  Persistent 1.2cm nodular opacity along right major fissure.\par 1/23/20 - Patient had mild increase in Ca125 to 44.  \par 2/24/20 - CT C/A/P - Stable nodular opacity along the RIGHT major fissure. Progression of endometrioid carcinoma the uterus with increasing endometrial thickness and \par increasing soft tissue. There now appears to be invasion of the myometrium. Increasing pelvic lymphadenopathy. \par 4/28/20 - CT C/A/P - Stable opacity in RML, stable 1.0cm right iliac chain LN, and 0.7cm obturator LN.  Increase in right internal iliac chain LN (was 0.9cm, now 1.6cm).  Increase in fluid density of endometrial cavity (was 2.1cm, now 3.1cm).  \par 5/18/20 - 6/9/20 - pelvic RT x 15 fractions (Shabnam Tapia)\par Per Dr. Greer plan is to retreat with a taxane (allergic to Carbo).  Will give Abraxane weekly 3on/1off x 4 cycles and continue on Keytruda. \par 6/23/20 - Started on Abraxane/Keytruda.  [de-identified] : poorly differentiated carcinoma - favor endometrioid adenocarcinoma, but could not distinguish between endometrial or cervical carcinoma.  [de-identified] : 1/9/19 - Foundation One - MSI high, TMB 34 -high, Alterations in ARID1A, CHUY, CTCF, CTNNB1, FANCA, FANCC, JAK1, MLL2, NFE2L2, PALB2, PIK3Ca, PTCH1, PTEN, STAG2, TP53.\par  [de-identified] : Patient reports she is doing very well.  Frustrated with hair loss.  \par States no N/V - eating well but has to watch what she eats due to diabetes. \par Denies any neuropathies.  No other complaints. \par She denies any other changes in her family, medical, or social history since her last visit of 4/16/20.

## 2020-07-21 NOTE — REASON FOR VISIT
[Follow-Up Visit] : a follow-up [Family Member] : family member [Pre-Treatment Visit] : a pre-treatment [FreeTextEntry2] : Endometrial Cancer - C2 D1 Abraxane/Keytruda

## 2020-07-22 DIAGNOSIS — R11.2 NAUSEA WITH VOMITING, UNSPECIFIED: ICD-10-CM

## 2020-07-22 DIAGNOSIS — Z51.11 ENCOUNTER FOR ANTINEOPLASTIC CHEMOTHERAPY: ICD-10-CM

## 2020-07-28 ENCOUNTER — RESULT REVIEW (OUTPATIENT)
Age: 65
End: 2020-07-28

## 2020-07-28 ENCOUNTER — APPOINTMENT (OUTPATIENT)
Age: 65
End: 2020-07-28

## 2020-07-28 VITALS
SYSTOLIC BLOOD PRESSURE: 145 MMHG | TEMPERATURE: 97.7 F | WEIGHT: 118.7 LBS | HEART RATE: 106 BPM | DIASTOLIC BLOOD PRESSURE: 77 MMHG | BODY MASS INDEX: 23.18 KG/M2

## 2020-08-04 ENCOUNTER — RESULT REVIEW (OUTPATIENT)
Age: 65
End: 2020-08-04

## 2020-08-04 ENCOUNTER — APPOINTMENT (OUTPATIENT)
Age: 65
End: 2020-08-04

## 2020-08-04 VITALS
SYSTOLIC BLOOD PRESSURE: 144 MMHG | TEMPERATURE: 97.5 F | WEIGHT: 119 LBS | BODY MASS INDEX: 23.24 KG/M2 | HEART RATE: 109 BPM | DIASTOLIC BLOOD PRESSURE: 80 MMHG

## 2020-08-05 ENCOUNTER — APPOINTMENT (OUTPATIENT)
Age: 65
End: 2020-08-05

## 2020-08-06 DIAGNOSIS — Z51.89 ENCOUNTER FOR OTHER SPECIFIED AFTERCARE: ICD-10-CM

## 2020-08-15 ENCOUNTER — LABORATORY RESULT (OUTPATIENT)
Age: 65
End: 2020-08-15

## 2020-08-17 ENCOUNTER — OUTPATIENT (OUTPATIENT)
Dept: OUTPATIENT SERVICES | Facility: HOSPITAL | Age: 65
LOS: 1 days | Discharge: HOME | End: 2020-08-17

## 2020-08-17 DIAGNOSIS — C54.1 MALIGNANT NEOPLASM OF ENDOMETRIUM: ICD-10-CM

## 2020-08-18 ENCOUNTER — APPOINTMENT (OUTPATIENT)
Age: 65
End: 2020-08-18
Payer: COMMERCIAL

## 2020-08-18 ENCOUNTER — RESULT REVIEW (OUTPATIENT)
Age: 65
End: 2020-08-18

## 2020-08-18 VITALS — SYSTOLIC BLOOD PRESSURE: 154 MMHG | DIASTOLIC BLOOD PRESSURE: 79 MMHG | BODY MASS INDEX: 23.05 KG/M2 | WEIGHT: 118 LBS

## 2020-08-18 VITALS
SYSTOLIC BLOOD PRESSURE: 154 MMHG | DIASTOLIC BLOOD PRESSURE: 79 MMHG | HEART RATE: 109 BPM | WEIGHT: 118 LBS | HEIGHT: 60 IN | BODY MASS INDEX: 23.16 KG/M2 | RESPIRATION RATE: 16 BRPM | TEMPERATURE: 97.9 F

## 2020-08-18 PROCEDURE — 99214 OFFICE O/P EST MOD 30 MIN: CPT

## 2020-08-18 NOTE — ASSESSMENT
[FreeTextEntry1] : RTC in 2 months; Ms. TIM was advised to contact the office should she have any additional questions or concerns in the interim.\par \par \par \par \par \par \par

## 2020-08-18 NOTE — HISTORY OF PRESENT ILLNESS
[Disease: _____________________] : Disease: [unfilled] [AJCC Stage: ____] : AJCC Stage: [unfilled] [de-identified] : 64 years old postmenopausal  female , with stage III C poorly differentiated, endometrioid carcinoma of the uterus diagnosed in 2018.\par \par CASE SYNOPSIS:\par \par 2018:\par Cervical mass biopsy – poorly differentiated carcinoma, favoring endometrial origin.\par \par 2018:\par Pelvic US (performed at [facility]): uterus size 9.9 x 5.3x 4.4 cm, endometrial mucosa thickness 24 mm, cervix with lobular appearance and heterogenous echo texture. Adnexae unremarkable, but a heterogenous solid  mass, measuring 3.8 x 3.6 x 2.8 cm, is described adjacent to the right ovary.\par \par 2018:\par CT C/A/P: No evidence of distant metastases; significant distension endometrial cavity (16 mm in size), extending into enlarged bulky cervix, with questionable proximal vaginal extension. Significant, diffuse retroperitoneal and pelvic lymphadenopathy ( lymph nodes in right pelvis abutting the right ovary, as seen on pelvis ultrasound.\par \par 2018:\par Pathology review [facility]: poorly differentiated, endometrioid adenocarcinoma; IHC non- contributory; cannot distinguished cervical vs endometrial origin.\par \par 2018:\par Started  Taxol/Carbo ( initially Q 21, switched to weekly schedule due to significant skin rash development after 1st dose).\par \par 2019: \par Foundation One study consistent with MSI- H, TBM- high status, no other actionable target gene alterations.\par \par 2019: continues cycle #4 chemotherapy with Taxol/ Carboplatin.\par   dropped from 269 (18) to 42 (19). \par \par 2019:\par PET/CT - decrease in FDG avidity in both the endometrial cavity, and in the retroperitoneal and abdominopelvic lymphadenopathy. Unchanged size  of bilateral retrocrural LAD.\par Multiple FDG avid left axillary and retropectoral lymph nodes (? new)- for which FNA recommended.\par Unchanged, minimally changed FDG avid nodular opacity RML.\par \par 19:\par Mammography: Area of architectural distortion central left breast, correlating with US -described lobulated hypoechoic mass left breast 12:00, 5 cm from the nipple.\par Breast ultrasound:\par Left breast 12:00 5 cm from the nipple lobulated hypoechoic mass with associated architectural distortion. Multiple abnormal left axillary lymph nodes with focal cortical thickening. Ultrasound-guided core biopsy recommended for both breast mass and axillary lymph node. \par A 12:00 retroareolar 0.4 cm intraductal mass, for which ultrasound guided core biopsy recommended.\par \par 2019:\par Patient developed rash on upper extremities after D1, cycle #6 Carboplatin; chemotherapy discontinued.\par \par 2019:\par US –guided left breast biopsy – pathology breast consistent with intraductal papilloma left breast with ductal hyperplasia, adenosis, apocrine metaplasia. \par Left axillary LN biopsy – metastatic adenocarcinoma with papillary features, compatible with metastatic disease from known endometrial primary.\par \par 19- started Pembrolizumab.\par  \par 19-  dropped from 163 to 28.\par \par 10/1/19- admitted at [facility] with KATHRYN and DKA; diagnosed with type I diabetes, and started insulin sliding scale.\par \par 10/1/19: CT C/A/P-overall improvement in disease burden in the uterus and lymph nodes compared to 18. \par Persistent 1.2 cm nodular opacity along the right major fissure. No acute chest pathology, no PE.\par \par 19: -   25 U/mL.\par \par 2020 CT C/A/P-progression of endometrioid carcinoma of the uterus with increasing endometrial thickness and increasing soft tissue.  There now appears to be invasion of the myometrium.  Increasing pelvic lymphadenopathy.  Stable nodular opacity along the right major fissure.\par \par –2020–received 3750 cGy to pelvis (Dr. Tapia).\par \par 2020-starts weekly  Abraxane in combination with pembrolizumab.\par  [de-identified] :  [de-identified] : poorly differentiated, endometrioid [FreeTextEntry1] : neoadjuvant Taxol/ Carboplatin \par Pembrolizumab [de-identified] : Ms. TIM is following up; last seen in the office in mid April 2020.  In interim she continued systemic treatment with pembrolizumab, and, after she finished palliative XRT to pelvis, she started systemic chemotherapy with weekly nab-Paclitaxel; completed 6/12 weeks # on 8/4/2020). Tolerating treatment well, with minimal side effects: mainly her loss.  Denies peripheral neuropathy.  She is otherwise energetic and continues to work.  Resumed vitamin B12 oral supplementation. Serum tumor marker  maintains within normal, at 15 U/mL.  She continues to practice social distancing, and tested negative for COVID-19.  Follows up with endocrinologist regularly.  Lately, she is stressed, taking more Xanax.

## 2020-08-18 NOTE — REVIEW OF SYSTEMS
[Dysmenorrhea/Abn Vaginal Bleeding] : dysmenorrhea/abnormal vaginal bleeding [Insomnia] : insomnia [Anxiety] : anxiety [Negative] : Heme/Lymph [Chest Pain] : no chest pain [Lower Ext Edema] : no lower extremity edema [Shortness Of Breath] : no shortness of breath [Cough] : no cough [Abdominal Pain] : no abdominal pain [Vomiting] : no vomiting [Skin Rash] : no skin rash [Easy Bleeding] : no tendency for easy bleeding [de-identified] : bilateral hands stiffness and sensory paresthesias. [Easy Bruising] : no tendency for easy bruising

## 2020-08-19 DIAGNOSIS — R11.2 NAUSEA WITH VOMITING, UNSPECIFIED: ICD-10-CM

## 2020-08-19 DIAGNOSIS — Z51.11 ENCOUNTER FOR ANTINEOPLASTIC CHEMOTHERAPY: ICD-10-CM

## 2020-08-25 ENCOUNTER — RESULT REVIEW (OUTPATIENT)
Age: 65
End: 2020-08-25

## 2020-08-25 ENCOUNTER — APPOINTMENT (OUTPATIENT)
Age: 65
End: 2020-08-25

## 2020-08-25 VITALS
WEIGHT: 119.6 LBS | BODY MASS INDEX: 23.36 KG/M2 | HEART RATE: 102 BPM | DIASTOLIC BLOOD PRESSURE: 78 MMHG | SYSTOLIC BLOOD PRESSURE: 133 MMHG | TEMPERATURE: 97.2 F

## 2020-09-01 ENCOUNTER — APPOINTMENT (OUTPATIENT)
Age: 65
End: 2020-09-01

## 2020-09-01 ENCOUNTER — RESULT REVIEW (OUTPATIENT)
Age: 65
End: 2020-09-01

## 2020-09-01 VITALS
HEIGHT: 60 IN | WEIGHT: 120 LBS | HEART RATE: 99 BPM | TEMPERATURE: 97.5 F | DIASTOLIC BLOOD PRESSURE: 77 MMHG | SYSTOLIC BLOOD PRESSURE: 120 MMHG | RESPIRATION RATE: 16 BRPM | BODY MASS INDEX: 23.56 KG/M2

## 2020-09-02 ENCOUNTER — APPOINTMENT (OUTPATIENT)
Age: 65
End: 2020-09-02

## 2020-09-03 DIAGNOSIS — Z51.89 ENCOUNTER FOR OTHER SPECIFIED AFTERCARE: ICD-10-CM

## 2020-09-15 ENCOUNTER — RESULT REVIEW (OUTPATIENT)
Age: 65
End: 2020-09-15

## 2020-09-15 ENCOUNTER — APPOINTMENT (OUTPATIENT)
Age: 65
End: 2020-09-15

## 2020-09-15 VITALS
HEART RATE: 101 BPM | TEMPERATURE: 96.4 F | BODY MASS INDEX: 23.95 KG/M2 | HEIGHT: 60 IN | SYSTOLIC BLOOD PRESSURE: 152 MMHG | DIASTOLIC BLOOD PRESSURE: 79 MMHG | RESPIRATION RATE: 16 BRPM | WEIGHT: 122 LBS

## 2020-09-15 RX ORDER — INSULIN ASPART 100 [IU]/ML
5 INJECTION, SOLUTION SUBCUTANEOUS
Qty: 0 | Refills: 0 | DISCHARGE

## 2020-09-21 ENCOUNTER — OUTPATIENT (OUTPATIENT)
Dept: OUTPATIENT SERVICES | Facility: HOSPITAL | Age: 65
LOS: 1 days | Discharge: HOME | End: 2020-09-21

## 2020-09-21 DIAGNOSIS — C54.1 MALIGNANT NEOPLASM OF ENDOMETRIUM: ICD-10-CM

## 2020-09-22 ENCOUNTER — RESULT REVIEW (OUTPATIENT)
Age: 65
End: 2020-09-22

## 2020-09-22 ENCOUNTER — APPOINTMENT (OUTPATIENT)
Age: 65
End: 2020-09-22

## 2020-09-22 VITALS
TEMPERATURE: 95.7 F | SYSTOLIC BLOOD PRESSURE: 128 MMHG | BODY MASS INDEX: 24.06 KG/M2 | DIASTOLIC BLOOD PRESSURE: 58 MMHG | HEART RATE: 96 BPM | WEIGHT: 123.2 LBS

## 2020-09-22 VITALS — TEMPERATURE: 98.2 F

## 2020-09-23 DIAGNOSIS — Z51.11 ENCOUNTER FOR ANTINEOPLASTIC CHEMOTHERAPY: ICD-10-CM

## 2020-09-23 DIAGNOSIS — R11.2 NAUSEA WITH VOMITING, UNSPECIFIED: ICD-10-CM

## 2020-09-29 ENCOUNTER — APPOINTMENT (OUTPATIENT)
Age: 65
End: 2020-09-29

## 2020-09-29 ENCOUNTER — RESULT REVIEW (OUTPATIENT)
Age: 65
End: 2020-09-29

## 2020-09-29 VITALS
HEART RATE: 108 BPM | TEMPERATURE: 97.5 F | DIASTOLIC BLOOD PRESSURE: 78 MMHG | SYSTOLIC BLOOD PRESSURE: 133 MMHG | BODY MASS INDEX: 24.14 KG/M2 | WEIGHT: 123.6 LBS

## 2020-09-29 LAB
BASOPHILS # BLD AUTO: 0.02 K/UL — SIGNIFICANT CHANGE UP (ref 0–0.2)
BASOPHILS NFR BLD AUTO: 0.7 % — SIGNIFICANT CHANGE UP (ref 0–2)
EOSINOPHIL # BLD AUTO: 0.08 K/UL — SIGNIFICANT CHANGE UP (ref 0–0.5)
EOSINOPHIL NFR BLD AUTO: 2.8 % — SIGNIFICANT CHANGE UP (ref 0–6)
HCT VFR BLD CALC: 34.6 % — SIGNIFICANT CHANGE UP (ref 34.5–45)
HGB BLD-MCNC: 11.5 G/DL — SIGNIFICANT CHANGE UP (ref 11.5–15.5)
IMM GRANULOCYTES NFR BLD AUTO: 0.7 % — SIGNIFICANT CHANGE UP (ref 0–1.5)
LYMPHOCYTES # BLD AUTO: 0.3 K/UL — LOW (ref 1–3.3)
LYMPHOCYTES # BLD AUTO: 10.6 % — LOW (ref 13–44)
MCHC RBC-ENTMCNC: 33.2 GM/DL — SIGNIFICANT CHANGE UP (ref 32–36)
MCHC RBC-ENTMCNC: 33.6 PG — SIGNIFICANT CHANGE UP (ref 27–34)
MCV RBC AUTO: 101.2 FL — HIGH (ref 80–100)
MONOCYTES # BLD AUTO: 0.26 K/UL — SIGNIFICANT CHANGE UP (ref 0–0.9)
MONOCYTES NFR BLD AUTO: 9.2 % — SIGNIFICANT CHANGE UP (ref 2–14)
NEUTROPHILS # BLD AUTO: 2.16 K/UL — SIGNIFICANT CHANGE UP (ref 1.8–7.4)
NEUTROPHILS NFR BLD AUTO: 76 % — SIGNIFICANT CHANGE UP (ref 43–77)
NRBC # BLD: 0 /100 WBCS — SIGNIFICANT CHANGE UP (ref 0–0)
PLATELET # BLD AUTO: 435 K/UL — HIGH (ref 150–400)
RBC # BLD: 3.42 M/UL — LOW (ref 3.8–5.2)
RBC # FLD: 13.9 % — SIGNIFICANT CHANGE UP (ref 10.3–14.5)
WBC # BLD: 2.84 K/UL — LOW (ref 3.8–10.5)
WBC # FLD AUTO: 2.84 K/UL — LOW (ref 3.8–10.5)

## 2020-10-26 ENCOUNTER — OUTPATIENT (OUTPATIENT)
Dept: OUTPATIENT SERVICES | Facility: HOSPITAL | Age: 65
LOS: 1 days | Discharge: HOME | End: 2020-10-26

## 2020-10-26 DIAGNOSIS — C54.1 MALIGNANT NEOPLASM OF ENDOMETRIUM: ICD-10-CM

## 2020-10-27 ENCOUNTER — RESULT REVIEW (OUTPATIENT)
Age: 65
End: 2020-10-27

## 2020-10-27 ENCOUNTER — APPOINTMENT (OUTPATIENT)
Age: 65
End: 2020-10-27
Payer: COMMERCIAL

## 2020-10-27 VITALS
SYSTOLIC BLOOD PRESSURE: 152 MMHG | DIASTOLIC BLOOD PRESSURE: 76 MMHG | BODY MASS INDEX: 24.45 KG/M2 | HEART RATE: 99 BPM | TEMPERATURE: 97.5 F | WEIGHT: 125.2 LBS

## 2020-10-27 LAB
ALBUMIN SERPL ELPH-MCNC: 4.5 G/DL — SIGNIFICANT CHANGE UP (ref 3.3–5)
ALP SERPL-CCNC: 82 U/L — SIGNIFICANT CHANGE UP (ref 40–120)
ALT FLD-CCNC: 17 U/L — SIGNIFICANT CHANGE UP (ref 10–45)
ANION GAP SERPL CALC-SCNC: 13 MMOL/L — SIGNIFICANT CHANGE UP (ref 5–17)
AST SERPL-CCNC: 18 U/L — SIGNIFICANT CHANGE UP (ref 10–40)
BASOPHILS # BLD AUTO: 0.03 K/UL — SIGNIFICANT CHANGE UP (ref 0–0.2)
BASOPHILS NFR BLD AUTO: 0.5 % — SIGNIFICANT CHANGE UP (ref 0–2)
BILIRUB SERPL-MCNC: 0.5 MG/DL — SIGNIFICANT CHANGE UP (ref 0.2–1.2)
BUN SERPL-MCNC: 13 MG/DL — SIGNIFICANT CHANGE UP (ref 7–23)
CALCIUM SERPL-MCNC: 9.3 MG/DL — SIGNIFICANT CHANGE UP (ref 8.4–10.5)
CANCER AG125 SERPL-ACNC: 18 U/ML — SIGNIFICANT CHANGE UP
CHLORIDE SERPL-SCNC: 102 MMOL/L — SIGNIFICANT CHANGE UP (ref 96–108)
CO2 SERPL-SCNC: 24 MMOL/L — SIGNIFICANT CHANGE UP (ref 22–31)
CREAT SERPL-MCNC: 0.53 MG/DL — SIGNIFICANT CHANGE UP (ref 0.5–1.3)
EOSINOPHIL # BLD AUTO: 0.26 K/UL — SIGNIFICANT CHANGE UP (ref 0–0.5)
EOSINOPHIL NFR BLD AUTO: 4.5 % — SIGNIFICANT CHANGE UP (ref 0–6)
GLUCOSE SERPL-MCNC: 64 MG/DL — LOW (ref 70–99)
HCT VFR BLD CALC: 39.5 % — SIGNIFICANT CHANGE UP (ref 34.5–45)
HGB BLD-MCNC: 13.1 G/DL — SIGNIFICANT CHANGE UP (ref 11.5–15.5)
IMM GRANULOCYTES NFR BLD AUTO: 0.2 % — SIGNIFICANT CHANGE UP (ref 0–1.5)
LYMPHOCYTES # BLD AUTO: 0.32 K/UL — LOW (ref 1–3.3)
LYMPHOCYTES # BLD AUTO: 5.6 % — LOW (ref 13–44)
MCHC RBC-ENTMCNC: 32.8 PG — SIGNIFICANT CHANGE UP (ref 27–34)
MCHC RBC-ENTMCNC: 33.2 GM/DL — SIGNIFICANT CHANGE UP (ref 32–36)
MCV RBC AUTO: 98.8 FL — SIGNIFICANT CHANGE UP (ref 80–100)
MONOCYTES # BLD AUTO: 0.41 K/UL — SIGNIFICANT CHANGE UP (ref 0–0.9)
MONOCYTES NFR BLD AUTO: 7.2 % — SIGNIFICANT CHANGE UP (ref 2–14)
NEUTROPHILS # BLD AUTO: 4.69 K/UL — SIGNIFICANT CHANGE UP (ref 1.8–7.4)
NEUTROPHILS NFR BLD AUTO: 82 % — HIGH (ref 43–77)
NRBC # BLD: 0 /100 WBCS — SIGNIFICANT CHANGE UP (ref 0–0)
PLATELET # BLD AUTO: 344 K/UL — SIGNIFICANT CHANGE UP (ref 150–400)
POTASSIUM SERPL-MCNC: 4.2 MMOL/L — SIGNIFICANT CHANGE UP (ref 3.5–5.3)
POTASSIUM SERPL-SCNC: 4.2 MMOL/L — SIGNIFICANT CHANGE UP (ref 3.5–5.3)
PROT SERPL-MCNC: 6.5 G/DL — SIGNIFICANT CHANGE UP (ref 6–8.3)
RBC # BLD: 4 M/UL — SIGNIFICANT CHANGE UP (ref 3.8–5.2)
RBC # FLD: 12.3 % — SIGNIFICANT CHANGE UP (ref 10.3–14.5)
SODIUM SERPL-SCNC: 139 MMOL/L — SIGNIFICANT CHANGE UP (ref 135–145)
WBC # BLD: 5.72 K/UL — SIGNIFICANT CHANGE UP (ref 3.8–10.5)
WBC # FLD AUTO: 5.72 K/UL — SIGNIFICANT CHANGE UP (ref 3.8–10.5)

## 2020-10-27 PROCEDURE — 99214 OFFICE O/P EST MOD 30 MIN: CPT | Mod: 25

## 2020-10-27 PROCEDURE — G0008: CPT

## 2020-10-27 RX ORDER — INSULIN ASPART 100 [IU]/ML
100 INJECTION, SOLUTION INTRAVENOUS; SUBCUTANEOUS
Refills: 0 | Status: DISCONTINUED | COMMUNITY
Start: 2019-10-31 | End: 2020-10-27

## 2020-10-27 RX ORDER — METFORMIN HYDROCHLORIDE 625 MG/1
TABLET ORAL
Refills: 0 | Status: DISCONTINUED | COMMUNITY
End: 2020-10-27

## 2020-10-27 RX ORDER — IRBESARTAN 150 MG/1
150 TABLET ORAL DAILY
Refills: 0 | Status: ACTIVE | COMMUNITY
Start: 2019-03-26

## 2020-10-27 RX ORDER — INSULIN GLARGINE 100 [IU]/ML
100 INJECTION, SOLUTION SUBCUTANEOUS
Refills: 0 | Status: DISCONTINUED | COMMUNITY
Start: 2019-10-31 | End: 2020-10-27

## 2020-10-27 RX ORDER — INSULIN GLARGINE 100 [IU]/ML
100 INJECTION, SOLUTION SUBCUTANEOUS
Refills: 0 | Status: ACTIVE | COMMUNITY
Start: 2020-10-27

## 2020-10-27 NOTE — ASSESSMENT
[FreeTextEntry1] : RTC in 2- 3 months; Ms. TIM was advised to contact the office should she have any additional questions or concerns in the interim.\par \par \par \par \par \par \par

## 2020-10-27 NOTE — REVIEW OF SYSTEMS
[Dysmenorrhea/Abn Vaginal Bleeding] : dysmenorrhea/abnormal vaginal bleeding [Insomnia] : insomnia [Anxiety] : anxiety [Negative] : Heme/Lymph [Chest Pain] : no chest pain [Lower Ext Edema] : no lower extremity edema [Shortness Of Breath] : no shortness of breath [Cough] : no cough [Abdominal Pain] : no abdominal pain [Vomiting] : no vomiting [Skin Rash] : no skin rash [Easy Bleeding] : no tendency for easy bleeding [Easy Bruising] : no tendency for easy bruising [de-identified] : bilateral hands stiffness and sensory paresthesias.

## 2020-10-27 NOTE — HISTORY OF PRESENT ILLNESS
[Disease: _____________________] : Disease: [unfilled] [AJCC Stage: ____] : AJCC Stage: [unfilled] [de-identified] : 64 years old postmenopausal  female , with stage III C poorly differentiated, endometrioid carcinoma of the uterus diagnosed in 2018.\par \par CASE SYNOPSIS:\par \par 2018:\par Cervical mass biopsy – poorly differentiated carcinoma, favoring endometrial origin.\par \par 2018:\par Pelvic US (performed at [facility]): uterus size 9.9 x 5.3x 4.4 cm, endometrial mucosa thickness 24 mm, cervix with lobular appearance and heterogenous echo texture. Adnexae unremarkable, but a heterogenous solid  mass, measuring 3.8 x 3.6 x 2.8 cm, is described adjacent to the right ovary.\par \par 2018:\par CT C/A/P: No evidence of distant metastases; significant distension endometrial cavity (16 mm in size), extending into enlarged bulky cervix, with questionable proximal vaginal extension. Significant, diffuse retroperitoneal and pelvic lymphadenopathy ( lymph nodes in right pelvis abutting the right ovary, as seen on pelvis ultrasound.\par \par 2018:\par Pathology review [facility]: poorly differentiated, endometrioid adenocarcinoma; IHC non- contributory; cannot distinguished cervical vs endometrial origin.\par \par 2018:\par Started  Taxol/Carbo ( initially Q 21, switched to weekly schedule due to significant skin rash development after 1st dose).\par \par 2019: \par Foundation One study consistent with MSI- H, TBM- high status, no other actionable target gene alterations.\par \par 2019: continues cycle #4 chemotherapy with Taxol/ Carboplatin.\par   dropped from 269 (18) to 42 (19). \par \par 2019:\par PET/CT - decrease in FDG avidity in both the endometrial cavity, and in the retroperitoneal and abdominopelvic lymphadenopathy. Unchanged size  of bilateral retrocrural LAD.\par Multiple FDG avid left axillary and retropectoral lymph nodes (? new)- for which FNA recommended.\par Unchanged, minimally changed FDG avid nodular opacity RML.\par \par 19:\par Mammography: Area of architectural distortion central left breast, correlating with US -described lobulated hypoechoic mass left breast 12:00, 5 cm from the nipple.\par Breast ultrasound:\par Left breast 12:00 5 cm from the nipple lobulated hypoechoic mass with associated architectural distortion. Multiple abnormal left axillary lymph nodes with focal cortical thickening. Ultrasound-guided core biopsy recommended for both breast mass and axillary lymph node. \par A 12:00 retroareolar 0.4 cm intraductal mass, for which ultrasound guided core biopsy recommended.\par \par 2019:\par Patient developed rash on upper extremities after D1, cycle #6 Carboplatin; chemotherapy discontinued.\par \par 2019:\par US –guided left breast biopsy – pathology breast consistent with intraductal papilloma left breast with ductal hyperplasia, adenosis, apocrine metaplasia. \par Left axillary LN biopsy – metastatic adenocarcinoma with papillary features, compatible with metastatic disease from known endometrial primary.\par \par 19- started Pembrolizumab.\par  \par 19-  dropped from 163 to 28.\par \par 10/1/19- admitted at [facility] with KATHRYN and DKA; diagnosed with type I diabetes, and started insulin sliding scale.\par \par 10/1/19: CT C/A/P-overall improvement in disease burden in the uterus and lymph nodes compared to 18. \par Persistent 1.2 cm nodular opacity along the right major fissure. No acute chest pathology, no PE.\par \par 19: -   25 U/mL.\par \par 2020 CT C/A/P-progression of endometrioid carcinoma of the uterus with increasing endometrial thickness and increasing soft tissue.  There now appears to be invasion of the myometrium.  Increasing pelvic lymphadenopathy.  Stable nodular opacity along the right major fissure.\par \par 2020: CT A/P–increasing right-sided pelvic adenopathy; increasing fluid distention of the endometrial cavity.  Stable opacity right lung, middle lobe\par \par –2020–received 3750 cGy to pelvis (Dr. Tapia).\par \par 2020- starts weekly  Abraxane in combination with pembrolizumab.\par \par 9/15/2020: –21\par  [de-identified] : poorly differentiated, endometrioid [de-identified] :  [FreeTextEntry1] : neoadjuvant Taxol/ Carboplatin \par Pembrolizumab/ Abraxane\par maintenance Pembrolizumab [de-identified] : Not seen in the office since August 2020; continues to work, denies B symptoms.  Compliant with Abraxane/pembrolizumab infusion ( completed cycle # 12 Abraxane on 9/29.).  Appears nontoxic.  Continues to take more Xanax for anxiety.  But tolerated systemic chemotherapy well.  Denies cutaneous toxicity, GI symptoms.  No recent hospitalizations. Hematologic picture consistent with fluctuant leukopenia and thrombocytosis; hemoglobin stable.  Last CT A/P was in April 2020, showing right-sided pelvic adenopathy and increasing fluid distention in the endometrial cavity, which prompted switching treatment to pembrolizumab and Abraxane.  No new abdominal imaging since April 2020. Here for ongoing maintenance Pembrolizumab.

## 2020-10-28 DIAGNOSIS — Z51.11 ENCOUNTER FOR ANTINEOPLASTIC CHEMOTHERAPY: ICD-10-CM

## 2020-11-09 ENCOUNTER — OUTPATIENT (OUTPATIENT)
Dept: OUTPATIENT SERVICES | Facility: HOSPITAL | Age: 65
LOS: 1 days | End: 2020-11-09
Payer: COMMERCIAL

## 2020-11-09 ENCOUNTER — APPOINTMENT (OUTPATIENT)
Dept: CT IMAGING | Facility: CLINIC | Age: 65
End: 2020-11-09
Payer: COMMERCIAL

## 2020-11-09 DIAGNOSIS — C54.1 MALIGNANT NEOPLASM OF ENDOMETRIUM: ICD-10-CM

## 2020-11-09 PROCEDURE — 74177 CT ABD & PELVIS W/CONTRAST: CPT

## 2020-11-09 PROCEDURE — 82565 ASSAY OF CREATININE: CPT

## 2020-11-09 PROCEDURE — 71260 CT THORAX DX C+: CPT

## 2020-11-09 PROCEDURE — 71260 CT THORAX DX C+: CPT | Mod: 26

## 2020-11-09 PROCEDURE — 74177 CT ABD & PELVIS W/CONTRAST: CPT | Mod: 26

## 2020-11-30 ENCOUNTER — OUTPATIENT (OUTPATIENT)
Dept: OUTPATIENT SERVICES | Facility: HOSPITAL | Age: 65
LOS: 1 days | Discharge: HOME | End: 2020-11-30

## 2020-11-30 DIAGNOSIS — C54.1 MALIGNANT NEOPLASM OF ENDOMETRIUM: ICD-10-CM

## 2020-12-04 ENCOUNTER — LABORATORY RESULT (OUTPATIENT)
Age: 65
End: 2020-12-04

## 2020-12-08 ENCOUNTER — RESULT REVIEW (OUTPATIENT)
Age: 65
End: 2020-12-08

## 2020-12-08 ENCOUNTER — APPOINTMENT (OUTPATIENT)
Age: 65
End: 2020-12-08

## 2020-12-08 VITALS
TEMPERATURE: 97.6 F | DIASTOLIC BLOOD PRESSURE: 80 MMHG | SYSTOLIC BLOOD PRESSURE: 147 MMHG | HEIGHT: 60 IN | HEART RATE: 99 BPM | WEIGHT: 121 LBS | BODY MASS INDEX: 23.75 KG/M2 | RESPIRATION RATE: 16 BRPM

## 2020-12-09 DIAGNOSIS — Z51.11 ENCOUNTER FOR ANTINEOPLASTIC CHEMOTHERAPY: ICD-10-CM

## 2021-01-15 ENCOUNTER — OUTPATIENT (OUTPATIENT)
Dept: OUTPATIENT SERVICES | Facility: HOSPITAL | Age: 66
LOS: 1 days | Discharge: HOME | End: 2021-01-15

## 2021-01-15 DIAGNOSIS — C54.1 MALIGNANT NEOPLASM OF ENDOMETRIUM: ICD-10-CM

## 2021-01-19 ENCOUNTER — APPOINTMENT (OUTPATIENT)
Age: 66
End: 2021-01-19
Payer: MEDICARE

## 2021-01-19 VITALS
HEART RATE: 94 BPM | SYSTOLIC BLOOD PRESSURE: 143 MMHG | DIASTOLIC BLOOD PRESSURE: 80 MMHG | RESPIRATION RATE: 16 BRPM | WEIGHT: 118 LBS | HEIGHT: 60 IN | BODY MASS INDEX: 23.16 KG/M2 | TEMPERATURE: 97 F

## 2021-01-19 PROCEDURE — 99214 OFFICE O/P EST MOD 30 MIN: CPT

## 2021-01-19 NOTE — HISTORY OF PRESENT ILLNESS
[Disease: _____________________] : Disease: [unfilled] [AJCC Stage: ____] : AJCC Stage: [unfilled] [de-identified] : 65 years old postmenopausal  female , with stage III C poorly differentiated, endometrioid carcinoma of the uterus diagnosed in 2018.\par \par CASE SYNOPSIS:\par \par 2018:\par Cervical mass biopsy – poorly differentiated carcinoma, favoring endometrial origin.\par \par 2018:\par Pelvic US (performed at [facility]): uterus size 9.9 x 5.3x 4.4 cm, endometrial mucosa thickness 24 mm, cervix with lobular appearance and heterogenous echo texture. Adnexae unremarkable, but a heterogenous solid  mass, measuring 3.8 x 3.6 x 2.8 cm, is described adjacent to the right ovary.\par \par 2018:\par CT C/A/P: No evidence of distant metastases; significant distension endometrial cavity (16 mm in size), extending into enlarged bulky cervix, with questionable proximal vaginal extension. Significant, diffuse retroperitoneal and pelvic lymphadenopathy ( lymph nodes in right pelvis abutting the right ovary, as seen on pelvis ultrasound.\par \par 2018:\par Pathology review [facility]: poorly differentiated, endometrioid adenocarcinoma; IHC non- contributory; cannot distinguished cervical vs endometrial origin.\par \par 2018:\par Started  Taxol/Carbo ( initially Q 21, switched to weekly schedule due to significant skin rash development after 1st dose).\par \par 2019: \par Foundation One study consistent with MSI- H, TBM- high status, no other actionable target gene alterations.\par \par 2019: continues cycle #4 chemotherapy with Taxol/ Carboplatin.\par   dropped from 269 (18) to 42 (19). \par \par 2019:\par PET/CT - decrease in FDG avidity in both the endometrial cavity, and in the retroperitoneal and abdominopelvic lymphadenopathy. Unchanged size  of bilateral retrocrural LAD.\par Multiple FDG avid left axillary and retropectoral lymph nodes (? new)- for which FNA recommended.\par Unchanged, minimally changed FDG avid nodular opacity RML.\par \par 19:\par Mammography: Area of architectural distortion central left breast, correlating with US -described lobulated hypoechoic mass left breast 12:00, 5 cm from the nipple.\par Breast ultrasound:\par Left breast 12:00 5 cm from the nipple lobulated hypoechoic mass with associated architectural distortion. Multiple abnormal left axillary lymph nodes with focal cortical thickening. Ultrasound-guided core biopsy recommended for both breast mass and axillary lymph node. \par A 12:00 retroareolar 0.4 cm intraductal mass, for which ultrasound guided core biopsy recommended.\par \par 2019:\par Patient developed rash on upper extremities after D1, cycle #6 Carboplatin; chemotherapy discontinued.\par \par 2019:\par US –guided left breast biopsy – pathology breast consistent with intraductal papilloma left breast with ductal hyperplasia, adenosis, apocrine metaplasia. \par Left axillary LN biopsy – metastatic adenocarcinoma with papillary features, compatible with metastatic disease from known endometrial primary.\par \par 19- started Pembrolizumab.\par  \par 19-  dropped from 163 to 28.\par \par 10/1/19- admitted at [facility] with KATHRYN and DKA; diagnosed with type I diabetes, and started insulin sliding scale.\par \par 10/1/19: CT C/A/P-overall improvement in disease burden in the uterus and lymph nodes compared to 18. \par Persistent 1.2 cm nodular opacity along the right major fissure. No acute chest pathology, no PE.\par \par 19: -   25 U/mL.\par \par 2020 CT C/A/P-progression of endometrioid carcinoma of the uterus with increasing endometrial thickness and increasing soft tissue.  There now appears to be invasion of the myometrium.  Increasing pelvic lymphadenopathy.  Stable nodular opacity along the right major fissure.\par \par 2020: CT A/P–increasing right-sided pelvic adenopathy; increasing fluid distention of the endometrial cavity.  Stable opacity right lung, middle lobe\par \par –2020–received 3750 cGy to pelvis (Dr. Tapia).\par \par 2020- starts weekly  Abraxane in combination with pembrolizumab.\par \par 9/15/2020: –21\par \par 20: CT C/A/P: Stable right middle lobe nodule; marked reduction in endometrial thickness since the prior study.  Extensive diverticulosis of the sigmoid colon with muscular hypertrophy; small fat-containing periumbilical hernia\par \par 2020- CA-125: 22\par \par  [de-identified] : poorly differentiated, endometrioid [de-identified] :  [FreeTextEntry1] : neoadjuvant Taxol/ Carboplatin \par Pembrolizumab/ Abraxane\par maintenance Pembrolizumab [de-identified] : Returning for follow-up and ongoing maintenance pembrolizumab; patient tolerating treatment with no significant side effects.  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  Medication list reviewed–no new medications.   Denies abdominal discomfort.  Most recent CT C/A/P from 11/9/2020 consistent with marked reduction in endometrial thickness, and no evidence of lymphadenopathy in the pelvic or intra-abdominal areas.  Serum tumor marker  remains in normal range.  The rest of laboratory profile also unremarkable.Patient under significant stress at home due to her son's recent life-threatening health issues. Lost 3 lbs since last visit.

## 2021-01-19 NOTE — RESULTS/DATA
[FreeTextEntry1] : Reviewed recent BW and imaging results with patient today.\par \par 3/26/20:\par WBC 6.55 K; hemoglobin  13.2 g/dL; hematocrit 39.7 %, platelet 379,000\par : 20\par \par 1/ 23/ 20:\par WBC 8.08 K; hemoglobin 12.8 g/dL; hematocrit 38.8 %, platelet 299, 000\par : 44\par \par 12/12/19:\par WBC 7.36, hemoglobin 13.5 g/dL, hematocrit 40.5%, platelet 340,000.\par \par 10/11/19:\par WBC 7.9, hemoglobin 12.7 g/dL, hematocrit 30.7%, platelet 470,000\par : 24\par Sodium 136, potassium 4.1, BUN 12, creatinine 0.76, glucose 225\par SGOT 26, SGPT 58,\par EGFR: 83 \par \par

## 2021-01-19 NOTE — REVIEW OF SYSTEMS
[Dysmenorrhea/Abn Vaginal Bleeding] : dysmenorrhea/abnormal vaginal bleeding [Insomnia] : insomnia [Anxiety] : anxiety [Negative] : Heme/Lymph [Recent Change In Weight] : ~T recent weight change [Chest Pain] : no chest pain [Lower Ext Edema] : no lower extremity edema [Shortness Of Breath] : no shortness of breath [Cough] : no cough [Abdominal Pain] : no abdominal pain [Vomiting] : no vomiting [Skin Rash] : no skin rash [Easy Bleeding] : no tendency for easy bleeding [Easy Bruising] : no tendency for easy bruising [FreeTextEntry2] : lost 3 lbs [de-identified] : bilateral hands stiffness and sensory paresthesias.

## 2021-01-20 DIAGNOSIS — Z51.11 ENCOUNTER FOR ANTINEOPLASTIC CHEMOTHERAPY: ICD-10-CM

## 2021-02-23 ENCOUNTER — OUTPATIENT (OUTPATIENT)
Dept: OUTPATIENT SERVICES | Facility: HOSPITAL | Age: 66
LOS: 1 days | Discharge: HOME | End: 2021-02-23

## 2021-02-23 DIAGNOSIS — C54.1 MALIGNANT NEOPLASM OF ENDOMETRIUM: ICD-10-CM

## 2021-02-24 ENCOUNTER — LABORATORY RESULT (OUTPATIENT)
Age: 66
End: 2021-02-24

## 2021-03-01 ENCOUNTER — RESULT REVIEW (OUTPATIENT)
Age: 66
End: 2021-03-01

## 2021-03-01 ENCOUNTER — APPOINTMENT (OUTPATIENT)
Age: 66
End: 2021-03-01

## 2021-03-01 VITALS
TEMPERATURE: 97 F | WEIGHT: 118.6 LBS | DIASTOLIC BLOOD PRESSURE: 75 MMHG | SYSTOLIC BLOOD PRESSURE: 129 MMHG | BODY MASS INDEX: 23.16 KG/M2 | HEART RATE: 84 BPM

## 2021-03-02 DIAGNOSIS — Z51.11 ENCOUNTER FOR ANTINEOPLASTIC CHEMOTHERAPY: ICD-10-CM

## 2021-04-12 ENCOUNTER — OUTPATIENT (OUTPATIENT)
Dept: OUTPATIENT SERVICES | Facility: HOSPITAL | Age: 66
LOS: 1 days | Discharge: HOME | End: 2021-04-12

## 2021-04-12 DIAGNOSIS — C54.1 MALIGNANT NEOPLASM OF ENDOMETRIUM: ICD-10-CM

## 2021-04-13 ENCOUNTER — RESULT REVIEW (OUTPATIENT)
Age: 66
End: 2021-04-13

## 2021-04-13 ENCOUNTER — APPOINTMENT (OUTPATIENT)
Age: 66
End: 2021-04-13

## 2021-04-13 VITALS
TEMPERATURE: 98.4 F | WEIGHT: 120 LBS | BODY MASS INDEX: 23.44 KG/M2 | HEART RATE: 86 BPM | SYSTOLIC BLOOD PRESSURE: 122 MMHG | DIASTOLIC BLOOD PRESSURE: 81 MMHG

## 2021-04-14 DIAGNOSIS — Z51.11 ENCOUNTER FOR ANTINEOPLASTIC CHEMOTHERAPY: ICD-10-CM

## 2021-05-21 ENCOUNTER — OUTPATIENT (OUTPATIENT)
Dept: OUTPATIENT SERVICES | Facility: HOSPITAL | Age: 66
LOS: 1 days | Discharge: HOME | End: 2021-05-21

## 2021-05-21 DIAGNOSIS — C54.1 MALIGNANT NEOPLASM OF ENDOMETRIUM: ICD-10-CM

## 2021-05-24 ENCOUNTER — APPOINTMENT (OUTPATIENT)
Dept: HEMATOLOGY ONCOLOGY | Facility: CLINIC | Age: 66
End: 2021-05-24

## 2021-05-24 ENCOUNTER — APPOINTMENT (OUTPATIENT)
Dept: INFUSION THERAPY | Facility: CLINIC | Age: 66
End: 2021-05-24

## 2021-05-25 ENCOUNTER — RESULT REVIEW (OUTPATIENT)
Age: 66
End: 2021-05-25

## 2021-05-25 ENCOUNTER — APPOINTMENT (OUTPATIENT)
Dept: INFUSION THERAPY | Facility: CLINIC | Age: 66
End: 2021-05-25
Payer: MEDICARE

## 2021-05-25 ENCOUNTER — APPOINTMENT (OUTPATIENT)
Dept: HEMATOLOGY ONCOLOGY | Facility: CLINIC | Age: 66
End: 2021-05-25
Payer: MEDICARE

## 2021-05-25 ENCOUNTER — APPOINTMENT (OUTPATIENT)
Age: 66
End: 2021-05-25

## 2021-05-25 VITALS
WEIGHT: 119 LBS | SYSTOLIC BLOOD PRESSURE: 158 MMHG | HEART RATE: 101 BPM | TEMPERATURE: 98 F | DIASTOLIC BLOOD PRESSURE: 76 MMHG | BODY MASS INDEX: 23.24 KG/M2

## 2021-05-25 DIAGNOSIS — Z79.899 OTHER LONG TERM (CURRENT) DRUG THERAPY: ICD-10-CM

## 2021-05-25 PROCEDURE — 99214 OFFICE O/P EST MOD 30 MIN: CPT

## 2021-05-25 RX ORDER — INSULIN DEGLUDEC INJECTION 100 U/ML
100 INJECTION, SOLUTION SUBCUTANEOUS
Refills: 0 | Status: DISCONTINUED | COMMUNITY
Start: 2020-10-27 | End: 2021-05-25

## 2021-05-25 RX ORDER — INSULIN ASPART 100 [IU]/ML
100 INJECTION, SOLUTION INTRAVENOUS; SUBCUTANEOUS
Qty: 15 | Refills: 0 | Status: ACTIVE | COMMUNITY
Start: 2021-04-29

## 2021-05-25 RX ORDER — INSULIN DEGLUDEC INJECTION 200 U/ML
200 INJECTION, SOLUTION SUBCUTANEOUS
Qty: 9 | Refills: 0 | Status: ACTIVE | COMMUNITY
Start: 2021-05-04

## 2021-05-25 RX ORDER — PEN NEEDLE, DIABETIC 31 GX5/16"
31G X 8 MM NEEDLE, DISPOSABLE MISCELLANEOUS
Qty: 300 | Refills: 0 | Status: ACTIVE | COMMUNITY
Start: 2021-05-14

## 2021-05-25 NOTE — REVIEW OF SYSTEMS
[Recent Change In Weight] : ~T recent weight change [Dysmenorrhea/Abn Vaginal Bleeding] : dysmenorrhea/abnormal vaginal bleeding [Insomnia] : insomnia [Anxiety] : anxiety [Patient Intake Form Reviewed] : Patient intake form was reviewed [Negative] : Allergic/Immunologic [Chest Pain] : no chest pain [Lower Ext Edema] : no lower extremity edema [Shortness Of Breath] : no shortness of breath [Cough] : no cough [Abdominal Pain] : no abdominal pain [Vomiting] : no vomiting [Skin Rash] : no skin rash [Easy Bleeding] : no tendency for easy bleeding [Easy Bruising] : no tendency for easy bruising

## 2021-05-25 NOTE — HISTORY OF PRESENT ILLNESS
[Disease: _____________________] : Disease: [unfilled] [AJCC Stage: ____] : AJCC Stage: [unfilled] [de-identified] : 65 years old postmenopausal  female , with stage III C poorly differentiated, endometrioid carcinoma of the uterus diagnosed in 2018.\par \par CASE SYNOPSIS:\par \par 2018:\par Cervical mass biopsy – poorly differentiated carcinoma, favoring endometrial origin.\par \par 2018:\par Pelvic US (performed at [facility]): uterus size 9.9 x 5.3x 4.4 cm, endometrial mucosa thickness 24 mm, cervix with lobular appearance and heterogenous echo texture. Adnexae unremarkable, but a heterogenous solid  mass, measuring 3.8 x 3.6 x 2.8 cm, is described adjacent to the right ovary.\par \par 2018:\par CT C/A/P: No evidence of distant metastases; significant distension endometrial cavity (16 mm in size), extending into enlarged bulky cervix, with questionable proximal vaginal extension. Significant, diffuse retroperitoneal and pelvic lymphadenopathy ( lymph nodes in right pelvis abutting the right ovary, as seen on pelvis ultrasound.\par \par 2018:\par Pathology review [facility]: poorly differentiated, endometrioid adenocarcinoma; IHC non- contributory; cannot distinguished cervical vs endometrial origin.\par \par 2018:\par Started  Taxol/Carbo.\par \par 2019: \par Foundation One study consistent with MSI- H, TBM- high status, no other actionable target gene alterations.\par \par 2019:\par PET/CT - decrease in FDG avidity in both the endometrial cavity, and in the retroperitoneal and abdominopelvic lymphadenopathy. Unchanged size  of bilateral retrocrural LAD.\par Multiple FDG avid left axillary and retropectoral lymph nodes (? new)- for which FNA recommended.\par Unchanged, minimally changed FDG avid nodular opacity RML.\par \par 19:\par Mammography: Area of architectural distortion central left breast, correlating with US -described lobulated hypoechoic mass left breast 12:00, 5 cm from the nipple.\par Breast ultrasound:\par Left breast 12:00 5 cm from the nipple lobulated hypoechoic mass with associated architectural distortion. Multiple abnormal left axillary lymph nodes with focal cortical thickening. Ultrasound-guided core biopsy recommended for both breast mass and axillary lymph node. \par A 12:00 retroareolar 0.4 cm intraductal mass, for which ultrasound guided core biopsy recommended.\par \par 2019:\par Patient developed rash on upper extremities after D1, cycle #6 Carboplatin; chemotherapy discontinued.\par \par 2019:\par US –guided left breast biopsy – pathology breast consistent with intraductal papilloma left breast with ductal hyperplasia, adenosis, apocrine metaplasia. \par Left axillary LN biopsy – metastatic adenocarcinoma with papillary features, compatible with metastatic disease from known endometrial primary.\par \par 19- started Pembrolizumab.\par \par 10/1/19- admitted at [facility] with KATHRYN and DKA; diagnosed with type I diabetes, and started insulin sliding scale.\par \par 10/1/19: CT C/A/P-overall improvement in disease burden in the uterus and lymph nodes compared to 18. \par Persistent 1.2 cm nodular opacity along the right major fissure. No acute chest pathology, no PE.\par \par 2020 CT C/A/P-progression of endometrioid carcinoma of the uterus with increasing endometrial thickness and increasing soft tissue.  There now appears to be invasion of the myometrium.  Increasing pelvic lymphadenopathy.  Stable nodular opacity along the right major fissure.\par \par 2020: CT A/P–increasing right-sided pelvic adenopathy; increasing fluid distention of the endometrial cavity.  Stable opacity right lung, middle lobe\par \par –2020–received 3750 cGy to pelvis (Dr. Tapia).\par \par 2020- starts weekly  Abraxane in combination with pembrolizumab.\par \par 20: CT C/A/P: Stable right middle lobe nodule; marked reduction in endometrial thickness since the prior study.  \par \par Continues pembrolizumab. \par  [de-identified] : poorly differentiated, endometrioid [de-identified] : Ca 125 was elevated at initial diagnosis [de-identified] : TMB  high  [FreeTextEntry1] : \par Pembrolizumab/ Abraxane\par maintenance Pembrolizumab [de-identified] : feels well. Tolerating pembrolizumab without any problems. has no complaints.\par Under tremendous stress at home- her son had an accident in Jan TBI, now at home undergoing rehab She is his care giver.

## 2021-05-25 NOTE — REASON FOR VISIT
[Follow-Up Visit] : a follow-up [Family Member] : family member [FreeTextEntry2] : endometrioid uterine cancer on  pembrolizumab

## 2021-05-26 DIAGNOSIS — Z51.12 ENCOUNTER FOR ANTINEOPLASTIC IMMUNOTHERAPY: ICD-10-CM

## 2021-05-26 DIAGNOSIS — Z51.11 ENCOUNTER FOR ANTINEOPLASTIC CHEMOTHERAPY: ICD-10-CM

## 2021-07-01 ENCOUNTER — OUTPATIENT (OUTPATIENT)
Dept: OUTPATIENT SERVICES | Facility: HOSPITAL | Age: 66
LOS: 1 days | Discharge: HOME | End: 2021-07-01

## 2021-07-01 DIAGNOSIS — C54.1 MALIGNANT NEOPLASM OF ENDOMETRIUM: ICD-10-CM

## 2021-07-06 ENCOUNTER — RESULT REVIEW (OUTPATIENT)
Age: 66
End: 2021-07-06

## 2021-07-06 ENCOUNTER — APPOINTMENT (OUTPATIENT)
Dept: INFUSION THERAPY | Facility: CLINIC | Age: 66
End: 2021-07-06

## 2021-07-06 VITALS
BODY MASS INDEX: 23.63 KG/M2 | SYSTOLIC BLOOD PRESSURE: 110 MMHG | HEART RATE: 86 BPM | WEIGHT: 121 LBS | TEMPERATURE: 98.1 F | DIASTOLIC BLOOD PRESSURE: 72 MMHG

## 2021-07-06 LAB
BASOPHILS # BLD AUTO: 0.03 K/UL — SIGNIFICANT CHANGE UP (ref 0–0.2)
BASOPHILS NFR BLD AUTO: 0.6 % — SIGNIFICANT CHANGE UP (ref 0–2)
EOSINOPHIL # BLD AUTO: 0.05 K/UL — SIGNIFICANT CHANGE UP (ref 0–0.5)
EOSINOPHIL NFR BLD AUTO: 0.9 % — SIGNIFICANT CHANGE UP (ref 0–6)
HCT VFR BLD CALC: 39.6 % — SIGNIFICANT CHANGE UP (ref 34.5–45)
HGB BLD-MCNC: 13.4 G/DL — SIGNIFICANT CHANGE UP (ref 11.5–15.5)
IMM GRANULOCYTES NFR BLD AUTO: 0.4 % — SIGNIFICANT CHANGE UP (ref 0–1.5)
LYMPHOCYTES # BLD AUTO: 0.49 K/UL — LOW (ref 1–3.3)
LYMPHOCYTES # BLD AUTO: 9.1 % — LOW (ref 13–44)
MCHC RBC-ENTMCNC: 32.2 PG — SIGNIFICANT CHANGE UP (ref 27–34)
MCHC RBC-ENTMCNC: 33.8 GM/DL — SIGNIFICANT CHANGE UP (ref 32–36)
MCV RBC AUTO: 95.2 FL — SIGNIFICANT CHANGE UP (ref 80–100)
MONOCYTES # BLD AUTO: 0.44 K/UL — SIGNIFICANT CHANGE UP (ref 0–0.9)
MONOCYTES NFR BLD AUTO: 8.1 % — SIGNIFICANT CHANGE UP (ref 2–14)
NEUTROPHILS # BLD AUTO: 4.38 K/UL — SIGNIFICANT CHANGE UP (ref 1.8–7.4)
NEUTROPHILS NFR BLD AUTO: 80.9 % — HIGH (ref 43–77)
NRBC # BLD: 0 /100 WBCS — SIGNIFICANT CHANGE UP (ref 0–0)
PLATELET # BLD AUTO: 303 K/UL — SIGNIFICANT CHANGE UP (ref 150–400)
RBC # BLD: 4.16 M/UL — SIGNIFICANT CHANGE UP (ref 3.8–5.2)
RBC # FLD: 12.3 % — SIGNIFICANT CHANGE UP (ref 10.3–14.5)
WBC # BLD: 5.41 K/UL — SIGNIFICANT CHANGE UP (ref 3.8–10.5)
WBC # FLD AUTO: 5.41 K/UL — SIGNIFICANT CHANGE UP (ref 3.8–10.5)

## 2021-07-07 DIAGNOSIS — Z51.11 ENCOUNTER FOR ANTINEOPLASTIC CHEMOTHERAPY: ICD-10-CM

## 2021-07-07 NOTE — ASSESSMENT
Anesthesia Pre Eval Note    Anesthesia ROS/Med Hx        Anesthetic Complication History:    History of postoperative nausea & vomiting (With GA)    Pulmonary Review:  Patient does not have a pulmonary history      Neuro/Psych Review:  Patient does not have a neuro/psych history       Cardiovascular Review:  Exercise tolerance: good (>4 METS)  Negative for angina  Negative for MAYS/SOB  Positive for hypertension  Positive for hyperlipidemia    GI/HEPATIC/RENAL Review:  Patient does not have a GI/hepatic/renalhistory       End/Other Review:  Positive for diabetes - type 2      Relevant Problems   No relevant active problems       Anes Physical Exam    Anes Plan   [FreeTextEntry1] : The patient is a 64 y.o. with a stage IV endometrial carcinoma (presumably endometrioid adenocarcinoma, but could not r/o cervical primary). \par s/p 1L Taxol/Carbo -> carbo allergy, but good response\par s/p 2L Keytruda -> excellent response, complicated by Type I DM.\par Now minor POD -> s/p XRT and now Abraxane added to regime - started 6/23/20. \par 1. Onc- Tolerating regime well.  Today D1 C2.  Needed Neulasta on D16 C1.  Counts now great.  \par Continue on current regime x 4 cycles.  \par Repeat Ca125 pending, but was normal but may no longer be reliable as was not elevated on this latest recurrence. \par 2. Hx Vit B12 deficiency - Patient reports not taking OTC B12 anymore - stopped when she ran out of pills this Spring. Advised to resume, especially since Metformin can impair absorption. \par 3. Hx Vit D deficiency - On 50K BIW - will recheck level today.  Last level 10/11/19 35.6. \par RTO D8   - 7/28/20 - Abraxane\par         D15 - 8/4/40 -  Abraxane + Keytruda.\par Then C#3/4 starts 8/18/20 +PE.\par \par \par Dr. Job Montes De Oca\par Dr. Olivia Alvarez\par Dr. Shabnam Tapia

## 2021-08-10 NOTE — DISCHARGE INSTRUCTIONS: CHEMOTHERAPY - TREATMENT DUE W FREE TEXT
Per patient:    Oxana Martinez go with the Mobic. Now that we have things a little under control I'd like to see if it can make a difference.  So lets try the Mobic for a month or two Feb 13, 2020 at 10 am

## 2021-08-16 ENCOUNTER — OUTPATIENT (OUTPATIENT)
Dept: OUTPATIENT SERVICES | Facility: HOSPITAL | Age: 66
LOS: 1 days | Discharge: HOME | End: 2021-08-16

## 2021-08-16 DIAGNOSIS — C54.1 MALIGNANT NEOPLASM OF ENDOMETRIUM: ICD-10-CM

## 2021-08-17 ENCOUNTER — APPOINTMENT (OUTPATIENT)
Dept: INFUSION THERAPY | Facility: CLINIC | Age: 66
End: 2021-08-17

## 2021-08-17 VITALS
DIASTOLIC BLOOD PRESSURE: 73 MMHG | SYSTOLIC BLOOD PRESSURE: 135 MMHG | BODY MASS INDEX: 23.95 KG/M2 | WEIGHT: 122 LBS | HEIGHT: 60 IN | HEART RATE: 76 BPM | TEMPERATURE: 97.6 F

## 2021-08-18 DIAGNOSIS — Z51.11 ENCOUNTER FOR ANTINEOPLASTIC CHEMOTHERAPY: ICD-10-CM

## 2021-08-31 ENCOUNTER — APPOINTMENT (OUTPATIENT)
Dept: CT IMAGING | Facility: CLINIC | Age: 66
End: 2021-08-31
Payer: MEDICARE

## 2021-08-31 ENCOUNTER — OUTPATIENT (OUTPATIENT)
Dept: OUTPATIENT SERVICES | Facility: HOSPITAL | Age: 66
LOS: 1 days | End: 2021-08-31
Payer: MEDICARE

## 2021-08-31 DIAGNOSIS — C54.1 MALIGNANT NEOPLASM OF ENDOMETRIUM: ICD-10-CM

## 2021-08-31 PROCEDURE — G1004: CPT

## 2021-08-31 PROCEDURE — 71260 CT THORAX DX C+: CPT | Mod: 26,MG

## 2021-08-31 PROCEDURE — 82565 ASSAY OF CREATININE: CPT

## 2021-08-31 PROCEDURE — 74177 CT ABD & PELVIS W/CONTRAST: CPT | Mod: MG

## 2021-08-31 PROCEDURE — 71260 CT THORAX DX C+: CPT | Mod: MG

## 2021-08-31 PROCEDURE — 74177 CT ABD & PELVIS W/CONTRAST: CPT | Mod: 26,MG

## 2021-09-27 ENCOUNTER — OUTPATIENT (OUTPATIENT)
Dept: OUTPATIENT SERVICES | Facility: HOSPITAL | Age: 66
LOS: 1 days | Discharge: HOME | End: 2021-09-27

## 2021-09-27 DIAGNOSIS — C54.1 MALIGNANT NEOPLASM OF ENDOMETRIUM: ICD-10-CM

## 2021-09-28 ENCOUNTER — RESULT REVIEW (OUTPATIENT)
Age: 66
End: 2021-09-28

## 2021-09-28 ENCOUNTER — APPOINTMENT (OUTPATIENT)
Dept: INFUSION THERAPY | Facility: CLINIC | Age: 66
End: 2021-09-28

## 2021-09-28 VITALS
BODY MASS INDEX: 24.15 KG/M2 | WEIGHT: 123 LBS | TEMPERATURE: 97.9 F | SYSTOLIC BLOOD PRESSURE: 148 MMHG | DIASTOLIC BLOOD PRESSURE: 77 MMHG | HEART RATE: 106 BPM | HEIGHT: 60 IN

## 2021-09-29 DIAGNOSIS — Z51.11 ENCOUNTER FOR ANTINEOPLASTIC CHEMOTHERAPY: ICD-10-CM

## 2021-11-06 ENCOUNTER — OUTPATIENT (OUTPATIENT)
Dept: OUTPATIENT SERVICES | Facility: HOSPITAL | Age: 66
LOS: 1 days | Discharge: HOME | End: 2021-11-06

## 2021-11-06 DIAGNOSIS — C54.1 MALIGNANT NEOPLASM OF ENDOMETRIUM: ICD-10-CM

## 2021-11-09 ENCOUNTER — RESULT REVIEW (OUTPATIENT)
Age: 66
End: 2021-11-09

## 2021-11-09 ENCOUNTER — APPOINTMENT (OUTPATIENT)
Dept: INFUSION THERAPY | Facility: CLINIC | Age: 66
End: 2021-11-09
Payer: MEDICARE

## 2021-11-09 ENCOUNTER — APPOINTMENT (OUTPATIENT)
Dept: HEMATOLOGY ONCOLOGY | Facility: CLINIC | Age: 66
End: 2021-11-09
Payer: MEDICARE

## 2021-11-09 DIAGNOSIS — Z51.12 ENCOUNTER FOR ANTINEOPLASTIC IMMUNOTHERAPY: ICD-10-CM

## 2021-11-09 LAB
ALBUMIN SERPL ELPH-MCNC: 4.6 G/DL — SIGNIFICANT CHANGE UP (ref 3.3–5)
ALP SERPL-CCNC: 75 U/L — SIGNIFICANT CHANGE UP (ref 40–120)
ALT FLD-CCNC: 23 U/L — SIGNIFICANT CHANGE UP (ref 10–45)
ANION GAP SERPL CALC-SCNC: 15 MMOL/L — SIGNIFICANT CHANGE UP (ref 5–17)
AST SERPL-CCNC: 17 U/L — SIGNIFICANT CHANGE UP (ref 10–40)
BASOPHILS # BLD AUTO: 0.04 K/UL — SIGNIFICANT CHANGE UP (ref 0–0.2)
BASOPHILS NFR BLD AUTO: 0.5 % — SIGNIFICANT CHANGE UP (ref 0–2)
BILIRUB SERPL-MCNC: 0.8 MG/DL — SIGNIFICANT CHANGE UP (ref 0.2–1.2)
BUN SERPL-MCNC: 15 MG/DL — SIGNIFICANT CHANGE UP (ref 7–23)
CALCIUM SERPL-MCNC: 9.3 MG/DL — SIGNIFICANT CHANGE UP (ref 8.4–10.5)
CANCER AG125 SERPL-ACNC: 30 U/ML — SIGNIFICANT CHANGE UP
CHLORIDE SERPL-SCNC: 104 MMOL/L — SIGNIFICANT CHANGE UP (ref 96–108)
CO2 SERPL-SCNC: 20 MMOL/L — LOW (ref 22–31)
CREAT SERPL-MCNC: 0.5 MG/DL — SIGNIFICANT CHANGE UP (ref 0.5–1.3)
EOSINOPHIL # BLD AUTO: 0.21 K/UL — SIGNIFICANT CHANGE UP (ref 0–0.5)
EOSINOPHIL NFR BLD AUTO: 2.7 % — SIGNIFICANT CHANGE UP (ref 0–6)
GLUCOSE SERPL-MCNC: 191 MG/DL — HIGH (ref 70–99)
HCT VFR BLD CALC: 40.2 % — SIGNIFICANT CHANGE UP (ref 34.5–45)
HGB BLD-MCNC: 13.8 G/DL — SIGNIFICANT CHANGE UP (ref 11.5–15.5)
IMM GRANULOCYTES NFR BLD AUTO: 0.4 % — SIGNIFICANT CHANGE UP (ref 0–1.5)
LYMPHOCYTES # BLD AUTO: 0.59 K/UL — LOW (ref 1–3.3)
LYMPHOCYTES # BLD AUTO: 7.7 % — LOW (ref 13–44)
MCHC RBC-ENTMCNC: 32.7 PG — SIGNIFICANT CHANGE UP (ref 27–34)
MCHC RBC-ENTMCNC: 34.3 GM/DL — SIGNIFICANT CHANGE UP (ref 32–36)
MCV RBC AUTO: 95.3 FL — SIGNIFICANT CHANGE UP (ref 80–100)
MONOCYTES # BLD AUTO: 0.48 K/UL — SIGNIFICANT CHANGE UP (ref 0–0.9)
MONOCYTES NFR BLD AUTO: 6.3 % — SIGNIFICANT CHANGE UP (ref 2–14)
NEUTROPHILS # BLD AUTO: 6.32 K/UL — SIGNIFICANT CHANGE UP (ref 1.8–7.4)
NEUTROPHILS NFR BLD AUTO: 82.4 % — HIGH (ref 43–77)
NRBC # BLD: 0 /100 WBCS — SIGNIFICANT CHANGE UP (ref 0–0)
PLATELET # BLD AUTO: 328 K/UL — SIGNIFICANT CHANGE UP (ref 150–400)
POTASSIUM SERPL-MCNC: 4.4 MMOL/L — SIGNIFICANT CHANGE UP (ref 3.5–5.3)
POTASSIUM SERPL-SCNC: 4.4 MMOL/L — SIGNIFICANT CHANGE UP (ref 3.5–5.3)
PROT SERPL-MCNC: 6.6 G/DL — SIGNIFICANT CHANGE UP (ref 6–8.3)
RBC # BLD: 4.22 M/UL — SIGNIFICANT CHANGE UP (ref 3.8–5.2)
RBC # FLD: 12.5 % — SIGNIFICANT CHANGE UP (ref 10.3–14.5)
SODIUM SERPL-SCNC: 138 MMOL/L — SIGNIFICANT CHANGE UP (ref 135–145)
WBC # BLD: 7.67 K/UL — SIGNIFICANT CHANGE UP (ref 3.8–10.5)
WBC # FLD AUTO: 7.67 K/UL — SIGNIFICANT CHANGE UP (ref 3.8–10.5)

## 2021-11-09 PROCEDURE — 99213 OFFICE O/P EST LOW 20 MIN: CPT

## 2021-11-09 NOTE — HISTORY OF PRESENT ILLNESS
[Disease: _____________________] : Disease: [unfilled] [AJCC Stage: ____] : AJCC Stage: [unfilled] [de-identified] : 65 years old postmenopausal  female , with stage III C poorly differentiated, endometrioid carcinoma of the uterus diagnosed in 2018.\par \par CASE SYNOPSIS:\par \par 2018:\par Cervical mass biopsy – poorly differentiated carcinoma, favoring endometrial origin.\par \par 2018:\par Pelvic US (performed at [facility]): uterus size 9.9 x 5.3x 4.4 cm, endometrial mucosa thickness 24 mm, cervix with lobular appearance and heterogenous echo texture. Adnexae unremarkable, but a heterogenous solid  mass, measuring 3.8 x 3.6 x 2.8 cm, is described adjacent to the right ovary.\par \par 2018:\par CT C/A/P: No evidence of distant metastases; significant distension endometrial cavity (16 mm in size), extending into enlarged bulky cervix, with questionable proximal vaginal extension. Significant, diffuse retroperitoneal and pelvic lymphadenopathy ( lymph nodes in right pelvis abutting the right ovary, as seen on pelvis ultrasound.\par \par 2018:\par Pathology review [facility]: poorly differentiated, endometrioid adenocarcinoma; IHC non- contributory; cannot distinguished cervical vs endometrial origin.\par \par 2018:\par Started  Taxol/Carbo.\par \par 2019: \par Foundation One study consistent with MSI- H, TBM- high status, no other actionable target gene alterations.\par \par 2019:\par PET/CT - decrease in FDG avidity in both the endometrial cavity, and in the retroperitoneal and abdominopelvic lymphadenopathy. Unchanged size  of bilateral retrocrural LAD.\par Multiple FDG avid left axillary and retropectoral lymph nodes (? new)- for which FNA recommended.\par Unchanged, minimally changed FDG avid nodular opacity RML.\par \par 19:\par Mammography: Area of architectural distortion central left breast, correlating with US -described lobulated hypoechoic mass left breast 12:00, 5 cm from the nipple.\par Breast ultrasound:\par Left breast 12:00 5 cm from the nipple lobulated hypoechoic mass with associated architectural distortion. Multiple abnormal left axillary lymph nodes with focal cortical thickening. Ultrasound-guided core biopsy recommended for both breast mass and axillary lymph node. \par A 12:00 retroareolar 0.4 cm intraductal mass, for which ultrasound guided core biopsy recommended.\par \par 2019:\par Patient developed rash on upper extremities after D1, cycle #6 Carboplatin; chemotherapy discontinued.\par \par 2019:\par US –guided left breast biopsy – pathology breast consistent with intraductal papilloma left breast with ductal hyperplasia, adenosis, apocrine metaplasia. \par Left axillary LN biopsy – metastatic adenocarcinoma with papillary features, compatible with metastatic disease from known endometrial primary.\par \par 19- started Pembrolizumab.\par \par 10/1/19- admitted at [facility] with KATHRYN and DKA; diagnosed with type I diabetes, and started insulin sliding scale.\par \par 10/1/19: CT C/A/P-overall improvement in disease burden in the uterus and lymph nodes compared to 18. \par Persistent 1.2 cm nodular opacity along the right major fissure. No acute chest pathology, no PE.\par \par 2020 CT C/A/P-progression of endometrioid carcinoma of the uterus with increasing endometrial thickness and increasing soft tissue.  There now appears to be invasion of the myometrium.  Increasing pelvic lymphadenopathy.  Stable nodular opacity along the right major fissure.\par \par 2020: CT A/P–increasing right-sided pelvic adenopathy; increasing fluid distention of the endometrial cavity.  Stable opacity right lung, middle lobe\par \par –2020–received 3750 cGy to pelvis (Dr. Tapia).\par \par 2020- starts weekly  Abraxane in combination with pembrolizumab.\par \par Scans ( most recent 21) no evidence of progression \par \par Continues pembrolizumab. \par  [de-identified] : poorly differentiated, endometrioid [de-identified] : Ca 125 was elevated at initial diagnosis [de-identified] : TMB  high  [FreeTextEntry1] : \par Pembrolizumab/ Abraxane\par maintenance Pembrolizumab [de-identified] : Feels great. Tolerating pembrolizumab without any problems. \par

## 2021-11-09 NOTE — ASSESSMENT
[FreeTextEntry1] : 66 y/o female stage at least III poorly diff endometrioid uterine cancer ( extensive metastatic retroperitoneal  VITO, later found to have metastatic  disease in axillary node and   pulm nodule )  [facility] ;  s/p Taxol/ catbo;  s/p Abraxane, on pembrolizumab since May 2019.\par \par Clinically doing well. Asymptomatic . \par \par LAst scans : August 2021-  no evidence of intraabdominal disease ( prominent LNs need monitoring) . \par Tumor  marker Ca 125 remains stable in normal range ( was elevated at time of diagnosis).\par \par Continue  immunotherapy. Good disease control.\par Scans in 6-12 months/ PRN.\par \par Exam in 4 months.

## 2021-11-09 NOTE — REVIEW OF SYSTEMS
[Patient Intake Form Reviewed] : Patient intake form was reviewed [Recent Change In Weight] : ~T recent weight change [Chest Pain] : no chest pain [Lower Ext Edema] : no lower extremity edema [Shortness Of Breath] : no shortness of breath [Cough] : no cough [Abdominal Pain] : no abdominal pain [Vomiting] : no vomiting [Dysmenorrhea/Abn Vaginal Bleeding] : dysmenorrhea/abnormal vaginal bleeding [Skin Rash] : no skin rash [Insomnia] : insomnia [Anxiety] : anxiety [Easy Bleeding] : no tendency for easy bleeding [Easy Bruising] : no tendency for easy bruising [Negative] : Allergic/Immunologic

## 2021-11-10 DIAGNOSIS — Z51.11 ENCOUNTER FOR ANTINEOPLASTIC CHEMOTHERAPY: ICD-10-CM

## 2021-12-20 ENCOUNTER — OUTPATIENT (OUTPATIENT)
Dept: OUTPATIENT SERVICES | Facility: HOSPITAL | Age: 66
LOS: 1 days | Discharge: HOME | End: 2021-12-20

## 2021-12-20 DIAGNOSIS — C54.1 MALIGNANT NEOPLASM OF ENDOMETRIUM: ICD-10-CM

## 2021-12-21 ENCOUNTER — RESULT REVIEW (OUTPATIENT)
Age: 66
End: 2021-12-21

## 2021-12-21 ENCOUNTER — APPOINTMENT (OUTPATIENT)
Dept: INFUSION THERAPY | Facility: CLINIC | Age: 66
End: 2021-12-21

## 2021-12-21 LAB
BASOPHILS # BLD AUTO: 0.03 K/UL — SIGNIFICANT CHANGE UP (ref 0–0.2)
BASOPHILS NFR BLD AUTO: 0.5 % — SIGNIFICANT CHANGE UP (ref 0–2)
EOSINOPHIL # BLD AUTO: 0.1 K/UL — SIGNIFICANT CHANGE UP (ref 0–0.5)
EOSINOPHIL NFR BLD AUTO: 1.5 % — SIGNIFICANT CHANGE UP (ref 0–6)
HCT VFR BLD CALC: 41 % — SIGNIFICANT CHANGE UP (ref 34.5–45)
HGB BLD-MCNC: 13.7 G/DL — SIGNIFICANT CHANGE UP (ref 11.5–15.5)
IMM GRANULOCYTES NFR BLD AUTO: 0.3 % — SIGNIFICANT CHANGE UP (ref 0–1.5)
LYMPHOCYTES # BLD AUTO: 0.45 K/UL — LOW (ref 1–3.3)
LYMPHOCYTES # BLD AUTO: 6.9 % — LOW (ref 13–44)
MCHC RBC-ENTMCNC: 31.9 PG — SIGNIFICANT CHANGE UP (ref 27–34)
MCHC RBC-ENTMCNC: 33.4 GM/DL — SIGNIFICANT CHANGE UP (ref 32–36)
MCV RBC AUTO: 95.6 FL — SIGNIFICANT CHANGE UP (ref 80–100)
MONOCYTES # BLD AUTO: 0.35 K/UL — SIGNIFICANT CHANGE UP (ref 0–0.9)
MONOCYTES NFR BLD AUTO: 5.4 % — SIGNIFICANT CHANGE UP (ref 2–14)
NEUTROPHILS # BLD AUTO: 5.55 K/UL — SIGNIFICANT CHANGE UP (ref 1.8–7.4)
NEUTROPHILS NFR BLD AUTO: 85.4 % — HIGH (ref 43–77)
NRBC # BLD: 0 /100 WBCS — SIGNIFICANT CHANGE UP (ref 0–0)
PLATELET # BLD AUTO: 330 K/UL — SIGNIFICANT CHANGE UP (ref 150–400)
RBC # BLD: 4.29 M/UL — SIGNIFICANT CHANGE UP (ref 3.8–5.2)
RBC # FLD: 12.1 % — SIGNIFICANT CHANGE UP (ref 10.3–14.5)
WBC # BLD: 6.5 K/UL — SIGNIFICANT CHANGE UP (ref 3.8–10.5)
WBC # FLD AUTO: 6.5 K/UL — SIGNIFICANT CHANGE UP (ref 3.8–10.5)

## 2021-12-22 DIAGNOSIS — Z51.11 ENCOUNTER FOR ANTINEOPLASTIC CHEMOTHERAPY: ICD-10-CM

## 2021-12-22 LAB
ALBUMIN SERPL ELPH-MCNC: 4.6 G/DL — SIGNIFICANT CHANGE UP (ref 3.3–5)
ALP SERPL-CCNC: 81 U/L — SIGNIFICANT CHANGE UP (ref 40–120)
ALT FLD-CCNC: 28 U/L — SIGNIFICANT CHANGE UP (ref 10–45)
ANION GAP SERPL CALC-SCNC: 17 MMOL/L — SIGNIFICANT CHANGE UP (ref 5–17)
AST SERPL-CCNC: 22 U/L — SIGNIFICANT CHANGE UP (ref 10–40)
BILIRUB SERPL-MCNC: 0.6 MG/DL — SIGNIFICANT CHANGE UP (ref 0.2–1.2)
BUN SERPL-MCNC: 14 MG/DL — SIGNIFICANT CHANGE UP (ref 7–23)
CALCIUM SERPL-MCNC: 9.2 MG/DL — SIGNIFICANT CHANGE UP (ref 8.4–10.5)
CANCER AG125 SERPL-ACNC: 37 U/ML — SIGNIFICANT CHANGE UP
CHLORIDE SERPL-SCNC: 101 MMOL/L — SIGNIFICANT CHANGE UP (ref 96–108)
CO2 SERPL-SCNC: 20 MMOL/L — LOW (ref 22–31)
CREAT SERPL-MCNC: 0.57 MG/DL — SIGNIFICANT CHANGE UP (ref 0.5–1.3)
GLUCOSE SERPL-MCNC: 209 MG/DL — HIGH (ref 70–99)
POTASSIUM SERPL-MCNC: 4.3 MMOL/L — SIGNIFICANT CHANGE UP (ref 3.5–5.3)
POTASSIUM SERPL-SCNC: 4.3 MMOL/L — SIGNIFICANT CHANGE UP (ref 3.5–5.3)
PROT SERPL-MCNC: 6.6 G/DL — SIGNIFICANT CHANGE UP (ref 6–8.3)
SODIUM SERPL-SCNC: 138 MMOL/L — SIGNIFICANT CHANGE UP (ref 135–145)
T4 FREE+ TSH PNL SERPL: 0.66 UIU/ML — SIGNIFICANT CHANGE UP (ref 0.27–4.2)

## 2022-01-01 NOTE — PROVIDER CONTACT NOTE (CRITICAL VALUE NOTIFICATION) - TEST AND RESULT REPORTED:
Lactate- 3.8
co2 5  glucose 517
ph 6.99
I have personally seen and examined the patient. I have collaborated with and supervised the

## 2022-01-11 ENCOUNTER — APPOINTMENT (OUTPATIENT)
Dept: INFUSION THERAPY | Facility: CLINIC | Age: 67
End: 2022-01-11

## 2022-01-31 ENCOUNTER — OUTPATIENT (OUTPATIENT)
Dept: OUTPATIENT SERVICES | Facility: HOSPITAL | Age: 67
LOS: 1 days | Discharge: HOME | End: 2022-01-31

## 2022-01-31 DIAGNOSIS — C54.1 MALIGNANT NEOPLASM OF ENDOMETRIUM: ICD-10-CM

## 2022-02-01 ENCOUNTER — RESULT REVIEW (OUTPATIENT)
Age: 67
End: 2022-02-01

## 2022-02-01 ENCOUNTER — APPOINTMENT (OUTPATIENT)
Dept: INFUSION THERAPY | Facility: CLINIC | Age: 67
End: 2022-02-01

## 2022-02-01 VITALS
HEART RATE: 98 BPM | OXYGEN SATURATION: 99 % | WEIGHT: 124 LBS | DIASTOLIC BLOOD PRESSURE: 82 MMHG | BODY MASS INDEX: 24.22 KG/M2 | TEMPERATURE: 98.3 F | RESPIRATION RATE: 17 BRPM | SYSTOLIC BLOOD PRESSURE: 132 MMHG

## 2022-02-01 LAB
ALBUMIN SERPL ELPH-MCNC: 4.7 G/DL — SIGNIFICANT CHANGE UP (ref 3.3–5)
ALP SERPL-CCNC: 91 U/L — SIGNIFICANT CHANGE UP (ref 40–120)
ALT FLD-CCNC: 24 U/L — SIGNIFICANT CHANGE UP (ref 10–45)
ANION GAP SERPL CALC-SCNC: 16 MMOL/L — SIGNIFICANT CHANGE UP (ref 5–17)
AST SERPL-CCNC: 16 U/L — SIGNIFICANT CHANGE UP (ref 10–40)
BASOPHILS # BLD AUTO: 0.03 K/UL — SIGNIFICANT CHANGE UP (ref 0–0.2)
BASOPHILS NFR BLD AUTO: 0.5 % — SIGNIFICANT CHANGE UP (ref 0–2)
BILIRUB SERPL-MCNC: 0.8 MG/DL — SIGNIFICANT CHANGE UP (ref 0.2–1.2)
BUN SERPL-MCNC: 18 MG/DL — SIGNIFICANT CHANGE UP (ref 7–23)
CALCIUM SERPL-MCNC: 9.6 MG/DL — SIGNIFICANT CHANGE UP (ref 8.4–10.5)
CANCER AG125 SERPL-ACNC: 47 U/ML — HIGH
CHLORIDE SERPL-SCNC: 102 MMOL/L — SIGNIFICANT CHANGE UP (ref 96–108)
CO2 SERPL-SCNC: 22 MMOL/L — SIGNIFICANT CHANGE UP (ref 22–31)
CREAT SERPL-MCNC: 0.55 MG/DL — SIGNIFICANT CHANGE UP (ref 0.5–1.3)
EOSINOPHIL # BLD AUTO: 0.12 K/UL — SIGNIFICANT CHANGE UP (ref 0–0.5)
EOSINOPHIL NFR BLD AUTO: 2.2 % — SIGNIFICANT CHANGE UP (ref 0–6)
GLUCOSE SERPL-MCNC: 176 MG/DL — HIGH (ref 70–99)
HCT VFR BLD CALC: 41 % — SIGNIFICANT CHANGE UP (ref 34.5–45)
HGB BLD-MCNC: 13.8 G/DL — SIGNIFICANT CHANGE UP (ref 11.5–15.5)
IMM GRANULOCYTES NFR BLD AUTO: 0.2 % — SIGNIFICANT CHANGE UP (ref 0–1.5)
LYMPHOCYTES # BLD AUTO: 0.46 K/UL — LOW (ref 1–3.3)
LYMPHOCYTES # BLD AUTO: 8.4 % — LOW (ref 13–44)
MCHC RBC-ENTMCNC: 32.2 PG — SIGNIFICANT CHANGE UP (ref 27–34)
MCHC RBC-ENTMCNC: 33.7 GM/DL — SIGNIFICANT CHANGE UP (ref 32–36)
MCV RBC AUTO: 95.8 FL — SIGNIFICANT CHANGE UP (ref 80–100)
MONOCYTES # BLD AUTO: 0.43 K/UL — SIGNIFICANT CHANGE UP (ref 0–0.9)
MONOCYTES NFR BLD AUTO: 7.8 % — SIGNIFICANT CHANGE UP (ref 2–14)
NEUTROPHILS # BLD AUTO: 4.43 K/UL — SIGNIFICANT CHANGE UP (ref 1.8–7.4)
NEUTROPHILS NFR BLD AUTO: 80.9 % — HIGH (ref 43–77)
NRBC # BLD: 0 /100 WBCS — SIGNIFICANT CHANGE UP (ref 0–0)
PLATELET # BLD AUTO: 355 K/UL — SIGNIFICANT CHANGE UP (ref 150–400)
POTASSIUM SERPL-MCNC: 4.2 MMOL/L — SIGNIFICANT CHANGE UP (ref 3.5–5.3)
POTASSIUM SERPL-SCNC: 4.2 MMOL/L — SIGNIFICANT CHANGE UP (ref 3.5–5.3)
PROT SERPL-MCNC: 6.7 G/DL — SIGNIFICANT CHANGE UP (ref 6–8.3)
RBC # BLD: 4.28 M/UL — SIGNIFICANT CHANGE UP (ref 3.8–5.2)
RBC # FLD: 12.6 % — SIGNIFICANT CHANGE UP (ref 10.3–14.5)
SODIUM SERPL-SCNC: 140 MMOL/L — SIGNIFICANT CHANGE UP (ref 135–145)
T4 FREE+ TSH PNL SERPL: 0.78 UIU/ML — SIGNIFICANT CHANGE UP (ref 0.27–4.2)
WBC # BLD: 5.48 K/UL — SIGNIFICANT CHANGE UP (ref 3.8–10.5)
WBC # FLD AUTO: 5.48 K/UL — SIGNIFICANT CHANGE UP (ref 3.8–10.5)

## 2022-02-02 DIAGNOSIS — Z51.11 ENCOUNTER FOR ANTINEOPLASTIC CHEMOTHERAPY: ICD-10-CM

## 2022-02-22 ENCOUNTER — APPOINTMENT (OUTPATIENT)
Dept: INFUSION THERAPY | Facility: CLINIC | Age: 67
End: 2022-02-22

## 2022-03-14 ENCOUNTER — OUTPATIENT (OUTPATIENT)
Dept: OUTPATIENT SERVICES | Facility: HOSPITAL | Age: 67
LOS: 1 days | Discharge: HOME | End: 2022-03-14

## 2022-03-14 DIAGNOSIS — C54.1 MALIGNANT NEOPLASM OF ENDOMETRIUM: ICD-10-CM

## 2022-03-15 ENCOUNTER — RESULT REVIEW (OUTPATIENT)
Age: 67
End: 2022-03-15

## 2022-03-15 ENCOUNTER — APPOINTMENT (OUTPATIENT)
Dept: HEMATOLOGY ONCOLOGY | Facility: CLINIC | Age: 67
End: 2022-03-15
Payer: MEDICARE

## 2022-03-15 ENCOUNTER — APPOINTMENT (OUTPATIENT)
Dept: INFUSION THERAPY | Facility: CLINIC | Age: 67
End: 2022-03-15
Payer: MEDICARE

## 2022-03-15 VITALS
RESPIRATION RATE: 18 BRPM | DIASTOLIC BLOOD PRESSURE: 78 MMHG | HEART RATE: 95 BPM | TEMPERATURE: 98 F | SYSTOLIC BLOOD PRESSURE: 144 MMHG | WEIGHT: 122 LBS | OXYGEN SATURATION: 100 % | BODY MASS INDEX: 23.83 KG/M2

## 2022-03-15 LAB
ALBUMIN SERPL ELPH-MCNC: 4.5 G/DL — SIGNIFICANT CHANGE UP (ref 3.3–5)
ALP SERPL-CCNC: 104 U/L — SIGNIFICANT CHANGE UP (ref 40–120)
ALT FLD-CCNC: 16 U/L — SIGNIFICANT CHANGE UP (ref 10–45)
ANION GAP SERPL CALC-SCNC: 12 MMOL/L — SIGNIFICANT CHANGE UP (ref 5–17)
AST SERPL-CCNC: 15 U/L — SIGNIFICANT CHANGE UP (ref 10–40)
BASOPHILS # BLD AUTO: 0.04 K/UL — SIGNIFICANT CHANGE UP (ref 0–0.2)
BASOPHILS NFR BLD AUTO: 0.6 % — SIGNIFICANT CHANGE UP (ref 0–2)
BILIRUB SERPL-MCNC: 0.6 MG/DL — SIGNIFICANT CHANGE UP (ref 0.2–1.2)
BUN SERPL-MCNC: 14 MG/DL — SIGNIFICANT CHANGE UP (ref 7–23)
CALCIUM SERPL-MCNC: 9.4 MG/DL — SIGNIFICANT CHANGE UP (ref 8.4–10.5)
CANCER AG125 SERPL-ACNC: 64 U/ML — HIGH
CHLORIDE SERPL-SCNC: 99 MMOL/L — SIGNIFICANT CHANGE UP (ref 96–108)
CO2 SERPL-SCNC: 24 MMOL/L — SIGNIFICANT CHANGE UP (ref 22–31)
CREAT SERPL-MCNC: 0.56 MG/DL — SIGNIFICANT CHANGE UP (ref 0.5–1.3)
EGFR: 101 ML/MIN/1.73M2 — SIGNIFICANT CHANGE UP
EOSINOPHIL # BLD AUTO: 0.17 K/UL — SIGNIFICANT CHANGE UP (ref 0–0.5)
EOSINOPHIL NFR BLD AUTO: 2.4 % — SIGNIFICANT CHANGE UP (ref 0–6)
GLUCOSE SERPL-MCNC: 197 MG/DL — HIGH (ref 70–99)
HCT VFR BLD CALC: 40.7 % — SIGNIFICANT CHANGE UP (ref 34.5–45)
HGB BLD-MCNC: 13.5 G/DL — SIGNIFICANT CHANGE UP (ref 11.5–15.5)
IMM GRANULOCYTES NFR BLD AUTO: 0.4 % — SIGNIFICANT CHANGE UP (ref 0–1.5)
LYMPHOCYTES # BLD AUTO: 0.47 K/UL — LOW (ref 1–3.3)
LYMPHOCYTES # BLD AUTO: 6.5 % — LOW (ref 13–44)
MCHC RBC-ENTMCNC: 31.5 PG — SIGNIFICANT CHANGE UP (ref 27–34)
MCHC RBC-ENTMCNC: 33.2 GM/DL — SIGNIFICANT CHANGE UP (ref 32–36)
MCV RBC AUTO: 95.1 FL — SIGNIFICANT CHANGE UP (ref 80–100)
MONOCYTES # BLD AUTO: 0.52 K/UL — SIGNIFICANT CHANGE UP (ref 0–0.9)
MONOCYTES NFR BLD AUTO: 7.2 % — SIGNIFICANT CHANGE UP (ref 2–14)
NEUTROPHILS # BLD AUTO: 6 K/UL — SIGNIFICANT CHANGE UP (ref 1.8–7.4)
NEUTROPHILS NFR BLD AUTO: 82.9 % — HIGH (ref 43–77)
NRBC # BLD: 0 /100 WBCS — SIGNIFICANT CHANGE UP (ref 0–0)
PLATELET # BLD AUTO: 383 K/UL — SIGNIFICANT CHANGE UP (ref 150–400)
POTASSIUM SERPL-MCNC: 4.1 MMOL/L — SIGNIFICANT CHANGE UP (ref 3.5–5.3)
POTASSIUM SERPL-SCNC: 4.1 MMOL/L — SIGNIFICANT CHANGE UP (ref 3.5–5.3)
PROT SERPL-MCNC: 6.8 G/DL — SIGNIFICANT CHANGE UP (ref 6–8.3)
RBC # BLD: 4.28 M/UL — SIGNIFICANT CHANGE UP (ref 3.8–5.2)
RBC # FLD: 12.4 % — SIGNIFICANT CHANGE UP (ref 10.3–14.5)
SODIUM SERPL-SCNC: 135 MMOL/L — SIGNIFICANT CHANGE UP (ref 135–145)
WBC # BLD: 7.23 K/UL — SIGNIFICANT CHANGE UP (ref 3.8–10.5)
WBC # FLD AUTO: 7.23 K/UL — SIGNIFICANT CHANGE UP (ref 3.8–10.5)

## 2022-03-15 PROCEDURE — 99214 OFFICE O/P EST MOD 30 MIN: CPT

## 2022-03-15 RX ORDER — BETAMETHASONE DIPROPIONATE 0.5 MG/G
0.05 OINTMENT TOPICAL
Qty: 15 | Refills: 0 | Status: ACTIVE | COMMUNITY
Start: 2022-03-08

## 2022-03-15 RX ORDER — BETAMETHASONE DIPROPIONATE 0.5 MG/G
0.05 OINTMENT TOPICAL
Qty: 15 | Refills: 0 | Status: COMPLETED | COMMUNITY
Start: 2022-03-08

## 2022-03-16 DIAGNOSIS — Z51.11 ENCOUNTER FOR ANTINEOPLASTIC CHEMOTHERAPY: ICD-10-CM

## 2022-03-17 DIAGNOSIS — Z51.12 ENCOUNTER FOR ANTINEOPLASTIC IMMUNOTHERAPY: ICD-10-CM

## 2022-03-17 NOTE — HISTORY OF PRESENT ILLNESS
[Disease: _____________________] : Disease: [unfilled] [AJCC Stage: ____] : AJCC Stage: [unfilled] [de-identified] : 65 years old postmenopausal  female , with stage III C poorly differentiated, endometrioid carcinoma of the uterus diagnosed in 2018.\par \par CASE SYNOPSIS:\par \par 2018:\par Cervical mass biopsy – poorly differentiated carcinoma, favoring endometrial origin.\par \par 2018:\par Pelvic US (performed at [facility]): uterus size 9.9 x 5.3x 4.4 cm, endometrial mucosa thickness 24 mm, cervix with lobular appearance and heterogenous echo texture. Adnexae unremarkable, but a heterogenous solid  mass, measuring 3.8 x 3.6 x 2.8 cm, is described adjacent to the right ovary.\par \par 2018:\par CT C/A/P: No evidence of distant metastases; significant distension endometrial cavity (16 mm in size), extending into enlarged bulky cervix, with questionable proximal vaginal extension. Significant, diffuse retroperitoneal and pelvic lymphadenopathy ( lymph nodes in right pelvis abutting the right ovary, as seen on pelvis ultrasound.\par \par 2018:\par Pathology review [facility]: poorly differentiated, endometrioid adenocarcinoma; IHC non- contributory; cannot distinguished cervical vs endometrial origin.\par \par 2018:\par Started  Taxol/Carbo.\par \par 2019: \par Foundation One study consistent with MSI- H, TBM- high status, no other actionable target gene alterations.\par \par 2019:\par PET/CT - decrease in FDG avidity in both the endometrial cavity, and in the retroperitoneal and abdominopelvic lymphadenopathy. Unchanged size  of bilateral retrocrural LAD.\par Multiple FDG avid left axillary and retropectoral lymph nodes (? new)- for which FNA recommended.\par Unchanged, minimally changed FDG avid nodular opacity RML.\par \par 19:\par Mammography: Area of architectural distortion central left breast, correlating with US -described lobulated hypoechoic mass left breast 12:00, 5 cm from the nipple.\par Breast ultrasound:\par Left breast 12:00 5 cm from the nipple lobulated hypoechoic mass with associated architectural distortion. Multiple abnormal left axillary lymph nodes with focal cortical thickening. Ultrasound-guided core biopsy recommended for both breast mass and axillary lymph node. \par A 12:00 retroareolar 0.4 cm intraductal mass, for which ultrasound guided core biopsy recommended.\par \par 2019:\par Patient developed rash on upper extremities after D1, cycle #6 Carboplatin; chemotherapy discontinued.\par \par 2019:\par US –guided left breast biopsy – pathology breast consistent with intraductal papilloma left breast with ductal hyperplasia, adenosis, apocrine metaplasia. \par Left axillary LN biopsy – metastatic adenocarcinoma with papillary features, compatible with metastatic disease from known endometrial primary.\par \par 19- started Pembrolizumab.\par \par 10/1/19- admitted at [facility] with KATHRYN and DKA; diagnosed with type I diabetes, and started insulin sliding scale.\par \par 10/1/19: CT C/A/P-overall improvement in disease burden in the uterus and lymph nodes compared to 18. \par Persistent 1.2 cm nodular opacity along the right major fissure. No acute chest pathology, no PE.\par \par 2020 CT C/A/P-progression of endometrioid carcinoma of the uterus with increasing endometrial thickness and increasing soft tissue.  There now appears to be invasion of the myometrium.  Increasing pelvic lymphadenopathy.  Stable nodular opacity along the right major fissure.\par \par 2020: CT A/P–increasing right-sided pelvic adenopathy; increasing fluid distention of the endometrial cavity.  Stable opacity right lung, middle lobe\par \par –2020–received 3750 cGy to pelvis (Dr. Tapia).\par \par 2020- starts weekly  Abraxane in combination with pembrolizumab.\par \par Scans ( most recent 21) no evidence of progression \par \par Continues pembrolizumab. \par  [de-identified] : poorly differentiated, endometrioid [de-identified] : Ca 125 was elevated at initial diagnosis [de-identified] : TMB  high  [FreeTextEntry1] : \par Pembrolizumab/ Abraxane\par maintenance Pembrolizumab [de-identified] : Feels great and has no new complaints except minor psoriatic rashes. Saw dermatologist who prescribed topical steroids PRN only .

## 2022-03-17 NOTE — ASSESSMENT
[FreeTextEntry1] : 67 y/o female diagnosed in 2018   poorly diff endometrioid uterine cancer ( extensive metastatic retroperitoneal  VITO, later found to have metastatic  disease in axillary node and   pulm nodule )  [facility] in [facility] ;  s/p Taxol/ catbo;  s/p Abraxane, on pembrolizumab since May 2019.\par \par Clinically doing well. Asymptomatic . \par \par LAst scans : August 2021-  no evidence of intraabdominal disease ( prominent LNs need monitoring) . \par \par Her tumor  marker Ca 125  started to trend up slightly. Continue monitoring and scans if markers continues to raise. She feels well and does not want to go for scans now.\par Will monitor  Ca 125 every 6 weeks ( Ca 125 was elevated at time of diagnosis) \par \par Continue  immunotherapy.\par Exam in  3 months /sooner PRN

## 2022-03-17 NOTE — PHYSICAL EXAM
[Normal] : affect appropriate [Fully active, able to carry on all pre-disease performance without restriction] : Status 0 - Fully active, able to carry on all pre-disease performance without restriction [de-identified] : minimal psoriatic rash on forearm

## 2022-03-17 NOTE — REVIEW OF SYSTEMS
[Patient Intake Form Reviewed] : Patient intake form was reviewed [Recent Change In Weight] : ~T recent weight change [Dysmenorrhea/Abn Vaginal Bleeding] : dysmenorrhea/abnormal vaginal bleeding [Insomnia] : insomnia [Negative] : Allergic/Immunologic [Chest Pain] : no chest pain [Lower Ext Edema] : no lower extremity edema [Shortness Of Breath] : no shortness of breath [Cough] : no cough [Abdominal Pain] : no abdominal pain [Vomiting] : no vomiting [Skin Rash] : no skin rash [Anxiety] : no anxiety [Easy Bleeding] : no tendency for easy bleeding [Easy Bruising] : no tendency for easy bruising [de-identified] : per HPI- rash minor and much better now

## 2022-04-19 ENCOUNTER — OUTPATIENT (OUTPATIENT)
Dept: OUTPATIENT SERVICES | Facility: HOSPITAL | Age: 67
LOS: 1 days | Discharge: HOME | End: 2022-04-19

## 2022-04-19 DIAGNOSIS — C54.1 MALIGNANT NEOPLASM OF ENDOMETRIUM: ICD-10-CM

## 2022-04-26 ENCOUNTER — RESULT REVIEW (OUTPATIENT)
Age: 67
End: 2022-04-26

## 2022-04-26 ENCOUNTER — NON-APPOINTMENT (OUTPATIENT)
Age: 67
End: 2022-04-26

## 2022-04-26 ENCOUNTER — APPOINTMENT (OUTPATIENT)
Dept: INFUSION THERAPY | Facility: CLINIC | Age: 67
End: 2022-04-26

## 2022-04-26 VITALS
SYSTOLIC BLOOD PRESSURE: 145 MMHG | WEIGHT: 118.5 LBS | HEART RATE: 114 BPM | RESPIRATION RATE: 18 BRPM | TEMPERATURE: 99.2 F | DIASTOLIC BLOOD PRESSURE: 78 MMHG | OXYGEN SATURATION: 99 % | BODY MASS INDEX: 23.14 KG/M2

## 2022-04-26 LAB
ALBUMIN SERPL ELPH-MCNC: 4.3 G/DL — SIGNIFICANT CHANGE UP (ref 3.3–5)
ALP SERPL-CCNC: 121 U/L — HIGH (ref 40–120)
ALT FLD-CCNC: 15 U/L — SIGNIFICANT CHANGE UP (ref 10–45)
ANION GAP SERPL CALC-SCNC: 13 MMOL/L — SIGNIFICANT CHANGE UP (ref 5–17)
AST SERPL-CCNC: 17 U/L — SIGNIFICANT CHANGE UP (ref 10–40)
BASOPHILS # BLD AUTO: 0.03 K/UL — SIGNIFICANT CHANGE UP (ref 0–0.2)
BASOPHILS NFR BLD AUTO: 0.3 % — SIGNIFICANT CHANGE UP (ref 0–2)
BILIRUB SERPL-MCNC: 0.8 MG/DL — SIGNIFICANT CHANGE UP (ref 0.2–1.2)
BUN SERPL-MCNC: 16 MG/DL — SIGNIFICANT CHANGE UP (ref 7–23)
CALCIUM SERPL-MCNC: 9.4 MG/DL — SIGNIFICANT CHANGE UP (ref 8.4–10.5)
CANCER AG125 SERPL-ACNC: 81 U/ML — HIGH
CHLORIDE SERPL-SCNC: 101 MMOL/L — SIGNIFICANT CHANGE UP (ref 96–108)
CO2 SERPL-SCNC: 23 MMOL/L — SIGNIFICANT CHANGE UP (ref 22–31)
CREAT SERPL-MCNC: 0.6 MG/DL — SIGNIFICANT CHANGE UP (ref 0.5–1.3)
EGFR: 99 ML/MIN/1.73M2 — SIGNIFICANT CHANGE UP
EOSINOPHIL # BLD AUTO: 0.09 K/UL — SIGNIFICANT CHANGE UP (ref 0–0.5)
EOSINOPHIL NFR BLD AUTO: 1 % — SIGNIFICANT CHANGE UP (ref 0–6)
GLUCOSE SERPL-MCNC: 203 MG/DL — HIGH (ref 70–99)
HCT VFR BLD CALC: 40.2 % — SIGNIFICANT CHANGE UP (ref 34.5–45)
HGB BLD-MCNC: 13.4 G/DL — SIGNIFICANT CHANGE UP (ref 11.5–15.5)
IMM GRANULOCYTES NFR BLD AUTO: 0.3 % — SIGNIFICANT CHANGE UP (ref 0–1.5)
LYMPHOCYTES # BLD AUTO: 0.48 K/UL — LOW (ref 1–3.3)
LYMPHOCYTES # BLD AUTO: 5.4 % — LOW (ref 13–44)
MCHC RBC-ENTMCNC: 31.8 PG — SIGNIFICANT CHANGE UP (ref 27–34)
MCHC RBC-ENTMCNC: 33.3 GM/DL — SIGNIFICANT CHANGE UP (ref 32–36)
MCV RBC AUTO: 95.3 FL — SIGNIFICANT CHANGE UP (ref 80–100)
MONOCYTES # BLD AUTO: 0.63 K/UL — SIGNIFICANT CHANGE UP (ref 0–0.9)
MONOCYTES NFR BLD AUTO: 7 % — SIGNIFICANT CHANGE UP (ref 2–14)
NEUTROPHILS # BLD AUTO: 7.71 K/UL — HIGH (ref 1.8–7.4)
NEUTROPHILS NFR BLD AUTO: 86 % — HIGH (ref 43–77)
NRBC # BLD: 0 /100 WBCS — SIGNIFICANT CHANGE UP (ref 0–0)
PLATELET # BLD AUTO: 463 K/UL — HIGH (ref 150–400)
POTASSIUM SERPL-MCNC: 4.2 MMOL/L — SIGNIFICANT CHANGE UP (ref 3.5–5.3)
POTASSIUM SERPL-SCNC: 4.2 MMOL/L — SIGNIFICANT CHANGE UP (ref 3.5–5.3)
PROT SERPL-MCNC: 6.8 G/DL — SIGNIFICANT CHANGE UP (ref 6–8.3)
RBC # BLD: 4.22 M/UL — SIGNIFICANT CHANGE UP (ref 3.8–5.2)
RBC # FLD: 11.9 % — SIGNIFICANT CHANGE UP (ref 10.3–14.5)
SODIUM SERPL-SCNC: 138 MMOL/L — SIGNIFICANT CHANGE UP (ref 135–145)
WBC # BLD: 8.97 K/UL — SIGNIFICANT CHANGE UP (ref 3.8–10.5)
WBC # FLD AUTO: 8.97 K/UL — SIGNIFICANT CHANGE UP (ref 3.8–10.5)

## 2022-04-27 DIAGNOSIS — Z51.11 ENCOUNTER FOR ANTINEOPLASTIC CHEMOTHERAPY: ICD-10-CM

## 2022-06-04 ENCOUNTER — OUTPATIENT (OUTPATIENT)
Dept: OUTPATIENT SERVICES | Facility: HOSPITAL | Age: 67
LOS: 1 days | Discharge: HOME | End: 2022-06-04

## 2022-06-04 DIAGNOSIS — C54.1 MALIGNANT NEOPLASM OF ENDOMETRIUM: ICD-10-CM

## 2022-06-07 ENCOUNTER — RESULT REVIEW (OUTPATIENT)
Age: 67
End: 2022-06-07

## 2022-06-07 ENCOUNTER — APPOINTMENT (OUTPATIENT)
Dept: INFUSION THERAPY | Facility: CLINIC | Age: 67
End: 2022-06-07

## 2022-06-07 VITALS
WEIGHT: 115 LBS | HEART RATE: 106 BPM | DIASTOLIC BLOOD PRESSURE: 76 MMHG | HEIGHT: 59.84 IN | SYSTOLIC BLOOD PRESSURE: 126 MMHG | BODY MASS INDEX: 22.58 KG/M2 | OXYGEN SATURATION: 98 % | RESPIRATION RATE: 18 BRPM | TEMPERATURE: 98.3 F

## 2022-06-07 LAB
ALBUMIN SERPL ELPH-MCNC: 4.3 G/DL — SIGNIFICANT CHANGE UP (ref 3.3–5)
ALP SERPL-CCNC: 117 U/L — SIGNIFICANT CHANGE UP (ref 40–120)
ALT FLD-CCNC: 12 U/L — SIGNIFICANT CHANGE UP (ref 10–45)
ANION GAP SERPL CALC-SCNC: 13 MMOL/L — SIGNIFICANT CHANGE UP (ref 5–17)
AST SERPL-CCNC: 14 U/L — SIGNIFICANT CHANGE UP (ref 10–40)
BASOPHILS # BLD AUTO: 0.02 K/UL — SIGNIFICANT CHANGE UP (ref 0–0.2)
BASOPHILS NFR BLD AUTO: 0.3 % — SIGNIFICANT CHANGE UP (ref 0–2)
BILIRUB SERPL-MCNC: 0.7 MG/DL — SIGNIFICANT CHANGE UP (ref 0.2–1.2)
BUN SERPL-MCNC: 11 MG/DL — SIGNIFICANT CHANGE UP (ref 7–23)
CALCIUM SERPL-MCNC: 9.5 MG/DL — SIGNIFICANT CHANGE UP (ref 8.4–10.5)
CANCER AG125 SERPL-ACNC: 87 U/ML — HIGH
CHLORIDE SERPL-SCNC: 99 MMOL/L — SIGNIFICANT CHANGE UP (ref 96–108)
CO2 SERPL-SCNC: 23 MMOL/L — SIGNIFICANT CHANGE UP (ref 22–31)
CREAT SERPL-MCNC: 0.49 MG/DL — LOW (ref 0.5–1.3)
EGFR: 104 ML/MIN/1.73M2 — SIGNIFICANT CHANGE UP
EOSINOPHIL # BLD AUTO: 0.04 K/UL — SIGNIFICANT CHANGE UP (ref 0–0.5)
EOSINOPHIL NFR BLD AUTO: 0.7 % — SIGNIFICANT CHANGE UP (ref 0–6)
GLUCOSE SERPL-MCNC: 234 MG/DL — HIGH (ref 70–99)
HCT VFR BLD CALC: 35.2 % — SIGNIFICANT CHANGE UP (ref 34.5–45)
HGB BLD-MCNC: 12.1 G/DL — SIGNIFICANT CHANGE UP (ref 11.5–15.5)
IMM GRANULOCYTES NFR BLD AUTO: 0.3 % — SIGNIFICANT CHANGE UP (ref 0–1.5)
LYMPHOCYTES # BLD AUTO: 0.49 K/UL — LOW (ref 1–3.3)
LYMPHOCYTES # BLD AUTO: 8 % — LOW (ref 13–44)
MCHC RBC-ENTMCNC: 31.8 PG — SIGNIFICANT CHANGE UP (ref 27–34)
MCHC RBC-ENTMCNC: 34.4 GM/DL — SIGNIFICANT CHANGE UP (ref 32–36)
MCV RBC AUTO: 92.6 FL — SIGNIFICANT CHANGE UP (ref 80–100)
MONOCYTES # BLD AUTO: 0.5 K/UL — SIGNIFICANT CHANGE UP (ref 0–0.9)
MONOCYTES NFR BLD AUTO: 8.1 % — SIGNIFICANT CHANGE UP (ref 2–14)
NEUTROPHILS # BLD AUTO: 5.08 K/UL — SIGNIFICANT CHANGE UP (ref 1.8–7.4)
NEUTROPHILS NFR BLD AUTO: 82.6 % — HIGH (ref 43–77)
NRBC # BLD: 0 /100 WBCS — SIGNIFICANT CHANGE UP (ref 0–0)
PLATELET # BLD AUTO: 383 K/UL — SIGNIFICANT CHANGE UP (ref 150–400)
POTASSIUM SERPL-MCNC: 4.3 MMOL/L — SIGNIFICANT CHANGE UP (ref 3.5–5.3)
POTASSIUM SERPL-SCNC: 4.3 MMOL/L — SIGNIFICANT CHANGE UP (ref 3.5–5.3)
PROT SERPL-MCNC: 6.6 G/DL — SIGNIFICANT CHANGE UP (ref 6–8.3)
RBC # BLD: 3.8 M/UL — SIGNIFICANT CHANGE UP (ref 3.8–5.2)
RBC # FLD: 12.7 % — SIGNIFICANT CHANGE UP (ref 10.3–14.5)
SODIUM SERPL-SCNC: 135 MMOL/L — SIGNIFICANT CHANGE UP (ref 135–145)
WBC # BLD: 6.15 K/UL — SIGNIFICANT CHANGE UP (ref 3.8–10.5)
WBC # FLD AUTO: 6.15 K/UL — SIGNIFICANT CHANGE UP (ref 3.8–10.5)

## 2022-06-08 DIAGNOSIS — Z51.11 ENCOUNTER FOR ANTINEOPLASTIC CHEMOTHERAPY: ICD-10-CM

## 2022-06-20 ENCOUNTER — NON-APPOINTMENT (OUTPATIENT)
Age: 67
End: 2022-06-20

## 2022-07-19 ENCOUNTER — RESULT REVIEW (OUTPATIENT)
Age: 67
End: 2022-07-19

## 2022-07-19 ENCOUNTER — APPOINTMENT (OUTPATIENT)
Dept: HEMATOLOGY ONCOLOGY | Facility: CLINIC | Age: 67
End: 2022-07-19

## 2022-07-19 ENCOUNTER — APPOINTMENT (OUTPATIENT)
Dept: INFUSION THERAPY | Facility: CLINIC | Age: 67
End: 2022-07-19

## 2022-07-19 VITALS
TEMPERATURE: 98.1 F | RESPIRATION RATE: 18 BRPM | SYSTOLIC BLOOD PRESSURE: 127 MMHG | HEART RATE: 113 BPM | OXYGEN SATURATION: 100 % | DIASTOLIC BLOOD PRESSURE: 81 MMHG | WEIGHT: 109.5 LBS | BODY MASS INDEX: 21.5 KG/M2

## 2022-07-19 LAB
ALBUMIN SERPL ELPH-MCNC: 3.9 G/DL — SIGNIFICANT CHANGE UP (ref 3.3–5)
ALP SERPL-CCNC: 99 U/L — SIGNIFICANT CHANGE UP (ref 40–120)
ALT FLD-CCNC: 17 U/L — SIGNIFICANT CHANGE UP (ref 10–45)
ANION GAP SERPL CALC-SCNC: 12 MMOL/L — SIGNIFICANT CHANGE UP (ref 5–17)
AST SERPL-CCNC: 16 U/L — SIGNIFICANT CHANGE UP (ref 10–40)
BASOPHILS # BLD AUTO: 0.03 K/UL — SIGNIFICANT CHANGE UP (ref 0–0.2)
BASOPHILS NFR BLD AUTO: 0.4 % — SIGNIFICANT CHANGE UP (ref 0–2)
BILIRUB SERPL-MCNC: 0.5 MG/DL — SIGNIFICANT CHANGE UP (ref 0.2–1.2)
BUN SERPL-MCNC: 11 MG/DL — SIGNIFICANT CHANGE UP (ref 7–23)
CALCIUM SERPL-MCNC: 9 MG/DL — SIGNIFICANT CHANGE UP (ref 8.4–10.5)
CANCER AG125 SERPL-ACNC: 199 U/ML — HIGH
CHLORIDE SERPL-SCNC: 99 MMOL/L — SIGNIFICANT CHANGE UP (ref 96–108)
CO2 SERPL-SCNC: 21 MMOL/L — LOW (ref 22–31)
CREAT SERPL-MCNC: 0.49 MG/DL — LOW (ref 0.5–1.3)
EGFR: 104 ML/MIN/1.73M2 — SIGNIFICANT CHANGE UP
EOSINOPHIL # BLD AUTO: 0.03 K/UL — SIGNIFICANT CHANGE UP (ref 0–0.5)
EOSINOPHIL NFR BLD AUTO: 0.4 % — SIGNIFICANT CHANGE UP (ref 0–6)
GLUCOSE SERPL-MCNC: 279 MG/DL — HIGH (ref 70–99)
HCT VFR BLD CALC: 38.8 % — SIGNIFICANT CHANGE UP (ref 34.5–45)
HGB BLD-MCNC: 13.1 G/DL — SIGNIFICANT CHANGE UP (ref 11.5–15.5)
IMM GRANULOCYTES NFR BLD AUTO: 0.5 % — SIGNIFICANT CHANGE UP (ref 0–1.5)
LYMPHOCYTES # BLD AUTO: 0.6 K/UL — LOW (ref 1–3.3)
LYMPHOCYTES # BLD AUTO: 7.9 % — LOW (ref 13–44)
MCHC RBC-ENTMCNC: 31 PG — SIGNIFICANT CHANGE UP (ref 27–34)
MCHC RBC-ENTMCNC: 33.8 GM/DL — SIGNIFICANT CHANGE UP (ref 32–36)
MCV RBC AUTO: 91.9 FL — SIGNIFICANT CHANGE UP (ref 80–100)
MONOCYTES # BLD AUTO: 0.59 K/UL — SIGNIFICANT CHANGE UP (ref 0–0.9)
MONOCYTES NFR BLD AUTO: 7.7 % — SIGNIFICANT CHANGE UP (ref 2–14)
NEUTROPHILS # BLD AUTO: 6.34 K/UL — SIGNIFICANT CHANGE UP (ref 1.8–7.4)
NEUTROPHILS NFR BLD AUTO: 83.1 % — HIGH (ref 43–77)
NRBC # BLD: 0 /100 WBCS — SIGNIFICANT CHANGE UP (ref 0–0)
PLATELET # BLD AUTO: 563 K/UL — HIGH (ref 150–400)
POTASSIUM SERPL-MCNC: 4.6 MMOL/L — SIGNIFICANT CHANGE UP (ref 3.5–5.3)
POTASSIUM SERPL-SCNC: 4.6 MMOL/L — SIGNIFICANT CHANGE UP (ref 3.5–5.3)
PROT SERPL-MCNC: 6.2 G/DL — SIGNIFICANT CHANGE UP (ref 6–8.3)
RBC # BLD: 4.22 M/UL — SIGNIFICANT CHANGE UP (ref 3.8–5.2)
RBC # FLD: 12.9 % — SIGNIFICANT CHANGE UP (ref 10.3–14.5)
SODIUM SERPL-SCNC: 132 MMOL/L — LOW (ref 135–145)
T4 FREE+ TSH PNL SERPL: 0.47 UIU/ML — SIGNIFICANT CHANGE UP (ref 0.27–4.2)
WBC # BLD: 7.63 K/UL — SIGNIFICANT CHANGE UP (ref 3.8–10.5)
WBC # FLD AUTO: 7.63 K/UL — SIGNIFICANT CHANGE UP (ref 3.8–10.5)

## 2022-07-19 PROCEDURE — 99214 OFFICE O/P EST MOD 30 MIN: CPT

## 2022-07-19 RX ORDER — BETAMETHASONE DIPROPIONATE 0.5 MG/G
0.05 CREAM TOPICAL
Qty: 45 | Refills: 0 | Status: ACTIVE | COMMUNITY
Start: 2022-06-13

## 2022-07-19 RX ORDER — PREDNISONE 5 MG/1
5 TABLET ORAL
Qty: 60 | Refills: 0 | Status: ACTIVE | COMMUNITY
Start: 2022-05-23

## 2022-07-19 RX ORDER — BARICITINIB 2 MG/1
2 TABLET, FILM COATED ORAL
Qty: 30 | Refills: 0 | Status: ACTIVE | COMMUNITY
Start: 2022-07-15

## 2022-07-19 NOTE — ASSESSMENT
[FreeTextEntry1] : 65 y/o female diagnosed in 2018   poorly diff endometrioid uterine cancer ( extensive metastatic retroperitoneal  VITO, later found to have metastatic  disease in axillary node and   pulm nodule )  [facility] in 2018 ;  s/p Taxol/ catbo;  s/p Abraxane, on pembrolizumab since May 2019.\par \par Clinically doing well. Asymptomatic . \par \par LAst scans : August 2021-  no evidence of intraabdominal disease ( prominent LNs need monitoring) . \par \par Her tumor  marker Ca 125  started to trend up slightly. Continue monitoring and scans sooner  if markers continues to raise. She feels well and does not want to go for scans now.\par Will get scans ( CT CAP) in September/ October/ sooner PRN \par Continue  immunotherapy.\par Exam in  3 months /sooner PRN

## 2022-07-19 NOTE — PHYSICAL EXAM
[Fully active, able to carry on all pre-disease performance without restriction] : Status 0 - Fully active, able to carry on all pre-disease performance without restriction [Normal] : affect appropriate [de-identified] : minimal psoriatic rash on forearm

## 2022-07-19 NOTE — HISTORY OF PRESENT ILLNESS
[Disease: _____________________] : Disease: [unfilled] [AJCC Stage: ____] : AJCC Stage: [unfilled] [de-identified] : 65 years old postmenopausal  female , with stage III C poorly differentiated, endometrioid carcinoma of the uterus diagnosed in 2018.\par \par CASE SYNOPSIS:\par \par 2018:\par Cervical mass biopsy – poorly differentiated carcinoma, favoring endometrial origin.\par \par 2018:\par Pelvic US (performed at [facility]): uterus size 9.9 x 5.3x 4.4 cm, endometrial mucosa thickness 24 mm, cervix with lobular appearance and heterogenous echo texture. Adnexae unremarkable, but a heterogenous solid  mass, measuring 3.8 x 3.6 x 2.8 cm, is described adjacent to the right ovary.\par \par 2018:\par CT C/A/P: No evidence of distant metastases; significant distension endometrial cavity (16 mm in size), extending into enlarged bulky cervix, with questionable proximal vaginal extension. Significant, diffuse retroperitoneal and pelvic lymphadenopathy ( lymph nodes in right pelvis abutting the right ovary, as seen on pelvis ultrasound.\par \par 2018:\par Pathology review [facility]: poorly differentiated, endometrioid adenocarcinoma; IHC non- contributory; cannot distinguished cervical vs endometrial origin.\par \par 2018:\par Started  Taxol/Carbo.\par \par 2019: \par Foundation One study consistent with MSI- H, TBM- high status, no other actionable target gene alterations.\par \par 2019:\par PET/CT - decrease in FDG avidity in both the endometrial cavity, and in the retroperitoneal and abdominopelvic lymphadenopathy. Unchanged size  of bilateral retrocrural LAD.\par Multiple FDG avid left axillary and retropectoral lymph nodes (? new)- for which FNA recommended.\par Unchanged, minimally changed FDG avid nodular opacity RML.\par \par 19:\par Mammography: Area of architectural distortion central left breast, correlating with US -described lobulated hypoechoic mass left breast 12:00, 5 cm from the nipple.\par Breast ultrasound:\par Left breast 12:00 5 cm from the nipple lobulated hypoechoic mass with associated architectural distortion. Multiple abnormal left axillary lymph nodes with focal cortical thickening. Ultrasound-guided core biopsy recommended for both breast mass and axillary lymph node. \par A 12:00 retroareolar 0.4 cm intraductal mass, for which ultrasound guided core biopsy recommended.\par \par 2019:\par Patient developed rash on upper extremities after D1, cycle #6 Carboplatin; chemotherapy discontinued.\par \par 2019:\par US –guided left breast biopsy – pathology breast consistent with intraductal papilloma left breast with ductal hyperplasia, adenosis, apocrine metaplasia. \par Left axillary LN biopsy – metastatic adenocarcinoma with papillary features, compatible with metastatic disease from known endometrial primary.\par \par 19- started Pembrolizumab.\par \par 10/1/19- admitted at [facility] with KATHRYN and DKA; diagnosed with type I diabetes, and started insulin sliding scale.\par \par 10/1/19: CT C/A/P-overall improvement in disease burden in the uterus and lymph nodes compared to 18. \par Persistent 1.2 cm nodular opacity along the right major fissure. No acute chest pathology, no PE.\par \par 2020 CT C/A/P-progression of endometrioid carcinoma of the uterus with increasing endometrial thickness and increasing soft tissue.  There now appears to be invasion of the myometrium.  Increasing pelvic lymphadenopathy.  Stable nodular opacity along the right major fissure.\par \par 2020: CT A/P–increasing right-sided pelvic adenopathy; increasing fluid distention of the endometrial cavity.  Stable opacity right lung, middle lobe\par \par –2020–received 3750 cGy to pelvis (Dr. Tapia).\par \par 2020- starts weekly  Abraxane in combination with pembrolizumab.\par \par Continues pembrolizumab. \par \par Scans ( most recent 21) no evidence of progression \par  [de-identified] : poorly differentiated, endometrioid [de-identified] : Ca 125 was elevated at initial diagnosis [de-identified] : TMB  high  [de-identified] : feels well. Treated with JAK2 inhibitor for psoriasis- rash resolved. Had bad knee pains- s/p bilat steroid injections, also lost weight- feels much better. Can walk well , more active She has  no complaints

## 2022-07-19 NOTE — REVIEW OF SYSTEMS
[Patient Intake Form Reviewed] : Patient intake form was reviewed [Recent Change In Weight] : ~T recent weight change [Dysmenorrhea/Abn Vaginal Bleeding] : dysmenorrhea/abnormal vaginal bleeding [Insomnia] : insomnia [Negative] : Allergic/Immunologic [Chest Pain] : no chest pain [Lower Ext Edema] : no lower extremity edema [Shortness Of Breath] : no shortness of breath [Cough] : no cough [Abdominal Pain] : no abdominal pain [Vomiting] : no vomiting [Skin Rash] : no skin rash [Anxiety] : no anxiety [Easy Bleeding] : no tendency for easy bleeding [Easy Bruising] : no tendency for easy bruising [de-identified] : per HPI- rash minor and much better now

## 2022-07-21 ENCOUNTER — RESULT REVIEW (OUTPATIENT)
Age: 67
End: 2022-07-21

## 2022-08-24 ENCOUNTER — OUTPATIENT (OUTPATIENT)
Dept: OUTPATIENT SERVICES | Facility: HOSPITAL | Age: 67
LOS: 1 days | Discharge: HOME | End: 2022-08-24

## 2022-08-24 DIAGNOSIS — C54.1 MALIGNANT NEOPLASM OF ENDOMETRIUM: ICD-10-CM

## 2022-08-30 ENCOUNTER — APPOINTMENT (OUTPATIENT)
Dept: INFUSION THERAPY | Facility: CLINIC | Age: 67
End: 2022-08-30

## 2022-08-30 ENCOUNTER — RESULT REVIEW (OUTPATIENT)
Age: 67
End: 2022-08-30

## 2022-08-30 VITALS
RESPIRATION RATE: 18 BRPM | OXYGEN SATURATION: 99 % | TEMPERATURE: 98.4 F | HEIGHT: 59 IN | DIASTOLIC BLOOD PRESSURE: 82 MMHG | BODY MASS INDEX: 22.43 KG/M2 | HEART RATE: 111 BPM | WEIGHT: 111.25 LBS | SYSTOLIC BLOOD PRESSURE: 134 MMHG

## 2022-08-30 LAB
ALBUMIN SERPL ELPH-MCNC: 4.6 G/DL — SIGNIFICANT CHANGE UP (ref 3.3–5)
ALP SERPL-CCNC: 102 U/L — SIGNIFICANT CHANGE UP (ref 40–120)
ALT FLD-CCNC: 28 U/L — SIGNIFICANT CHANGE UP (ref 10–45)
ANION GAP SERPL CALC-SCNC: 13 MMOL/L — SIGNIFICANT CHANGE UP (ref 5–17)
AST SERPL-CCNC: 24 U/L — SIGNIFICANT CHANGE UP (ref 10–40)
BASOPHILS # BLD AUTO: 0.05 K/UL — SIGNIFICANT CHANGE UP (ref 0–0.2)
BASOPHILS NFR BLD AUTO: 0.6 % — SIGNIFICANT CHANGE UP (ref 0–2)
BILIRUB SERPL-MCNC: 0.7 MG/DL — SIGNIFICANT CHANGE UP (ref 0.2–1.2)
BUN SERPL-MCNC: 15 MG/DL — SIGNIFICANT CHANGE UP (ref 7–23)
CALCIUM SERPL-MCNC: 10.1 MG/DL — SIGNIFICANT CHANGE UP (ref 8.4–10.5)
CANCER AG125 SERPL-ACNC: 413 U/ML — HIGH
CHLORIDE SERPL-SCNC: 98 MMOL/L — SIGNIFICANT CHANGE UP (ref 96–108)
CO2 SERPL-SCNC: 23 MMOL/L — SIGNIFICANT CHANGE UP (ref 22–31)
CREAT SERPL-MCNC: 0.5 MG/DL — SIGNIFICANT CHANGE UP (ref 0.5–1.3)
EGFR: 103 ML/MIN/1.73M2 — SIGNIFICANT CHANGE UP
EOSINOPHIL # BLD AUTO: 0.09 K/UL — SIGNIFICANT CHANGE UP (ref 0–0.5)
EOSINOPHIL NFR BLD AUTO: 1.1 % — SIGNIFICANT CHANGE UP (ref 0–6)
GLUCOSE SERPL-MCNC: 226 MG/DL — HIGH (ref 70–99)
HCT VFR BLD CALC: 39.1 % — SIGNIFICANT CHANGE UP (ref 34.5–45)
HGB BLD-MCNC: 13.5 G/DL — SIGNIFICANT CHANGE UP (ref 11.5–15.5)
IMM GRANULOCYTES NFR BLD AUTO: 0.5 % — SIGNIFICANT CHANGE UP (ref 0–1.5)
LYMPHOCYTES # BLD AUTO: 0.95 K/UL — LOW (ref 1–3.3)
LYMPHOCYTES # BLD AUTO: 12.1 % — LOW (ref 13–44)
MCHC RBC-ENTMCNC: 31.5 PG — SIGNIFICANT CHANGE UP (ref 27–34)
MCHC RBC-ENTMCNC: 34.5 GM/DL — SIGNIFICANT CHANGE UP (ref 32–36)
MCV RBC AUTO: 91.1 FL — SIGNIFICANT CHANGE UP (ref 80–100)
MONOCYTES # BLD AUTO: 0.71 K/UL — SIGNIFICANT CHANGE UP (ref 0–0.9)
MONOCYTES NFR BLD AUTO: 9 % — SIGNIFICANT CHANGE UP (ref 2–14)
NEUTROPHILS # BLD AUTO: 6.01 K/UL — SIGNIFICANT CHANGE UP (ref 1.8–7.4)
NEUTROPHILS NFR BLD AUTO: 76.7 % — SIGNIFICANT CHANGE UP (ref 43–77)
NRBC # BLD: 0 /100 WBCS — SIGNIFICANT CHANGE UP (ref 0–0)
PLATELET # BLD AUTO: 385 K/UL — SIGNIFICANT CHANGE UP (ref 150–400)
POTASSIUM SERPL-MCNC: 4 MMOL/L — SIGNIFICANT CHANGE UP (ref 3.5–5.3)
POTASSIUM SERPL-SCNC: 4 MMOL/L — SIGNIFICANT CHANGE UP (ref 3.5–5.3)
PROT SERPL-MCNC: 7.4 G/DL — SIGNIFICANT CHANGE UP (ref 6–8.3)
RBC # BLD: 4.29 M/UL — SIGNIFICANT CHANGE UP (ref 3.8–5.2)
RBC # FLD: 13.3 % — SIGNIFICANT CHANGE UP (ref 10.3–14.5)
SODIUM SERPL-SCNC: 135 MMOL/L — SIGNIFICANT CHANGE UP (ref 135–145)
WBC # BLD: 7.85 K/UL — SIGNIFICANT CHANGE UP (ref 3.8–10.5)
WBC # FLD AUTO: 7.85 K/UL — SIGNIFICANT CHANGE UP (ref 3.8–10.5)

## 2022-08-31 DIAGNOSIS — Z51.11 ENCOUNTER FOR ANTINEOPLASTIC CHEMOTHERAPY: ICD-10-CM

## 2022-09-13 ENCOUNTER — APPOINTMENT (OUTPATIENT)
Dept: CT IMAGING | Facility: CLINIC | Age: 67
End: 2022-09-13

## 2022-09-13 ENCOUNTER — OUTPATIENT (OUTPATIENT)
Dept: OUTPATIENT SERVICES | Facility: HOSPITAL | Age: 67
LOS: 1 days | End: 2022-09-13
Payer: MEDICARE

## 2022-09-13 DIAGNOSIS — C54.1 MALIGNANT NEOPLASM OF ENDOMETRIUM: ICD-10-CM

## 2022-09-13 PROCEDURE — 74177 CT ABD & PELVIS W/CONTRAST: CPT

## 2022-09-13 PROCEDURE — 74177 CT ABD & PELVIS W/CONTRAST: CPT | Mod: 26,MH

## 2022-09-13 PROCEDURE — 71260 CT THORAX DX C+: CPT | Mod: 26,MH

## 2022-09-13 PROCEDURE — 71260 CT THORAX DX C+: CPT

## 2022-09-21 ENCOUNTER — APPOINTMENT (OUTPATIENT)
Dept: HEMATOLOGY ONCOLOGY | Facility: CLINIC | Age: 67
End: 2022-09-21

## 2022-09-21 VITALS
RESPIRATION RATE: 17 BRPM | WEIGHT: 111.25 LBS | TEMPERATURE: 98.6 F | DIASTOLIC BLOOD PRESSURE: 83 MMHG | SYSTOLIC BLOOD PRESSURE: 153 MMHG | OXYGEN SATURATION: 99 % | BODY MASS INDEX: 22.47 KG/M2 | HEART RATE: 116 BPM

## 2022-09-21 PROCEDURE — 99215 OFFICE O/P EST HI 40 MIN: CPT

## 2022-09-21 NOTE — ASSESSMENT
[FreeTextEntry1] : 67 y/o female diagnosed in 2018   poorly diff endometrioid uterine cancer ( extensive metastatic retroperitoneal  VITO, later found to have metastatic  disease in axillary node and   pulm nodule )  [facility] ;  s/p Taxol/ catbo;  s/p Abraxane, on pembrolizumab since May 2019.\par \par Clinically doing well. Asymptomatic but recent scans showed disease progression- enlarging axillary retropectoral and retroperit pelvic VITO . \par \par \par reviewed next line therapy options. She is very reluctant to have chemotherapy,\par \par Plan :\par Add bevacizumab  15 mg/kg every  3 weeks and continue pembrolizumab every 3 weeks .\par Discussed side effects  of bevacizumab and monitoring.\par Scans in 4-6 months Monitor Ca 125\par \par In the future she will be a candidate for clinical trials with targeted therapy ( has multiple mutations- option for PARP inhibitors and mTOR inhib\par \par \par Return visit for bevacizumab/ pembrolizumab in 3 weeks \par \par Patient Intake Form reviewed. Routine cancer screening not indicated.

## 2022-09-21 NOTE — REVIEW OF SYSTEMS
[Patient Intake Form Reviewed] : Patient intake form was reviewed [Recent Change In Weight] : ~T recent weight change [Dysmenorrhea/Abn Vaginal Bleeding] : dysmenorrhea/abnormal vaginal bleeding [Insomnia] : insomnia [Anxiety] : anxiety [Negative] : Integumentary [Chest Pain] : no chest pain [Lower Ext Edema] : no lower extremity edema [Shortness Of Breath] : no shortness of breath [Cough] : no cough [Abdominal Pain] : no abdominal pain [Vomiting] : no vomiting [Skin Rash] : no skin rash [Easy Bleeding] : no tendency for easy bleeding [Easy Bruising] : no tendency for easy bruising

## 2022-09-21 NOTE — PHYSICAL EXAM
[Fully active, able to carry on all pre-disease performance without restriction] : Status 0 - Fully active, able to carry on all pre-disease performance without restriction [Normal] : affect appropriate [de-identified] : minimal psoriatic rash on forearm

## 2022-09-21 NOTE — HISTORY OF PRESENT ILLNESS
[Disease: _____________________] : Disease: [unfilled] [AJCC Stage: ____] : AJCC Stage: [unfilled] [de-identified] : 65 years old postmenopausal  female , with stage III C poorly differentiated, endometrioid carcinoma of the uterus diagnosed in 2018.\par \par CASE SYNOPSIS:\par \par 2018:\par Cervical mass biopsy – poorly differentiated carcinoma, favoring endometrial origin.\par \par 2018:\par Pelvic US (performed at [facility] uterus size 9.9 x 5.3x 4.4 cm, endometrial mucosa thickness 24 mm, cervix with lobular appearance and heterogenous echo texture. Adnexae unremarkable, but a heterogenous solid  mass, measuring 3.8 x 3.6 x 2.8 cm, is described adjacent to the right ovary.\par \par 2018:\par CT C/A/P: No evidence of distant metastases; significant distension endometrial cavity (16 mm in size), extending into enlarged bulky cervix, with questionable proximal vaginal extension. Significant, diffuse retroperitoneal and pelvic lymphadenopathy ( lymph nodes in right pelvis abutting the right ovary, as seen on pelvis ultrasound.\par \par 2018:\par Pathology review [facility]: poorly differentiated, endometrioid adenocarcinoma; IHC non- contributory; cannot distinguished cervical vs endometrial origin.\par \par 2018:\par Started  Taxol/Carbo.\par \par 2019: \par Foundation One study consistent with MSI- H, TBM- high status, no other actionable target gene alterations.\par \par 2019:\par PET/CT - decrease in FDG avidity in both the endometrial cavity, and in the retroperitoneal and abdominopelvic lymphadenopathy. Unchanged size  of bilateral retrocrural LAD.\par Multiple FDG avid left axillary and retropectoral lymph nodes (? new)- for which FNA recommended.\par Unchanged, minimally changed FDG avid nodular opacity RML.\par \par 19:\par Mammography: Area of architectural distortion central left breast, correlating with US -described lobulated hypoechoic mass left breast 12:00, 5 cm from the nipple.\par Breast ultrasound:\par Left breast 12:00 5 cm from the nipple lobulated hypoechoic mass with associated architectural distortion. Multiple abnormal left axillary lymph nodes with focal cortical thickening. Ultrasound-guided core biopsy recommended for both breast mass and axillary lymph node. \par A 12:00 retroareolar 0.4 cm intraductal mass, for which ultrasound guided core biopsy recommended.\par \par 2019:\par Patient developed rash on upper extremities after D1, cycle #6 Carboplatin; chemotherapy discontinued.\par \par 2019:\par US –guided left breast biopsy – pathology breast consistent with intraductal papilloma left breast with ductal hyperplasia, adenosis, apocrine metaplasia. \par Left axillary LN biopsy – metastatic adenocarcinoma with papillary features, compatible with metastatic disease from known endometrial primary.\par \par 19- started Pembrolizumab.\par \par 10/1/19- admitted at [facility] with KATHRYN and DKA; diagnosed with type I diabetes, and started insulin sliding scale.\par \par 10/1/19: CT C/A/P-overall improvement in disease burden in the uterus and lymph nodes compared to 18. \par Persistent 1.2 cm nodular opacity along the right major fissure. No acute chest pathology, no PE.\par \par 2020 CT C/A/P-progression of endometrioid carcinoma of the uterus with increasing endometrial thickness and increasing soft tissue.  There now appears to be invasion of the myometrium.  Increasing pelvic lymphadenopathy.  Stable nodular opacity along the right major fissure.\par \par 2020: CT A/P–increasing right-sided pelvic adenopathy; increasing fluid distention of the endometrial cavity.  Stable opacity right lung, middle lobe\par \par –2020–received 3750 cGy to pelvis (Dr. Tapia).\par \par 2020- starts weekly  Abraxane in combination with pembrolizumab next on pembrolizumab maintenance.  \par \par CT CAP 22 : POD bulky L axillary and retropectoral adenopathy, new retroperitoneal and pelvic adenopathy   Ca 125 rising \par  [de-identified] : poorly differentiated, endometrioid [de-identified] : Ca 125 was elevated at initial diagnosis [de-identified] : TMB  high\par FoundationOne done Jan 2019- multiple mutations : PALB2 PIK3CA  CHUY, and others  \par ER moderate  ( path April 2019 )  [de-identified] : Feels well and has no complaints Very nervous about scan results

## 2022-10-11 ENCOUNTER — RESULT REVIEW (OUTPATIENT)
Age: 67
End: 2022-10-11

## 2022-10-11 ENCOUNTER — APPOINTMENT (OUTPATIENT)
Dept: HEMATOLOGY ONCOLOGY | Facility: CLINIC | Age: 67
End: 2022-10-11

## 2022-10-11 ENCOUNTER — RESULT CHARGE (OUTPATIENT)
Age: 67
End: 2022-10-11

## 2022-10-11 ENCOUNTER — APPOINTMENT (OUTPATIENT)
Dept: INFUSION THERAPY | Facility: CLINIC | Age: 67
End: 2022-10-11

## 2022-10-11 VITALS
OXYGEN SATURATION: 100 % | HEART RATE: 112 BPM | BODY MASS INDEX: 22.68 KG/M2 | SYSTOLIC BLOOD PRESSURE: 135 MMHG | TEMPERATURE: 98.2 F | WEIGHT: 112.5 LBS | RESPIRATION RATE: 18 BRPM | DIASTOLIC BLOOD PRESSURE: 78 MMHG | HEIGHT: 59 IN

## 2022-10-11 LAB
ALBUMIN SERPL ELPH-MCNC: 4.5 G/DL — SIGNIFICANT CHANGE UP (ref 3.3–5)
ALP SERPL-CCNC: 115 U/L — SIGNIFICANT CHANGE UP (ref 40–120)
ALT FLD-CCNC: 16 U/L — SIGNIFICANT CHANGE UP (ref 10–45)
ANION GAP SERPL CALC-SCNC: 14 MMOL/L — SIGNIFICANT CHANGE UP (ref 5–17)
AST SERPL-CCNC: 12 U/L — SIGNIFICANT CHANGE UP (ref 10–40)
BASOPHILS # BLD AUTO: 0.03 K/UL — SIGNIFICANT CHANGE UP (ref 0–0.2)
BASOPHILS NFR BLD AUTO: 0.4 % — SIGNIFICANT CHANGE UP (ref 0–2)
BILIRUB SERPL-MCNC: 0.8 MG/DL — SIGNIFICANT CHANGE UP (ref 0.2–1.2)
BUN SERPL-MCNC: 16 MG/DL — SIGNIFICANT CHANGE UP (ref 7–23)
CALCIUM SERPL-MCNC: 9.6 MG/DL — SIGNIFICANT CHANGE UP (ref 8.4–10.5)
CANCER AG125 SERPL-ACNC: 554 U/ML — HIGH
CHLORIDE SERPL-SCNC: 98 MMOL/L — SIGNIFICANT CHANGE UP (ref 96–108)
CO2 SERPL-SCNC: 23 MMOL/L — SIGNIFICANT CHANGE UP (ref 22–31)
CREAT SERPL-MCNC: 0.54 MG/DL — SIGNIFICANT CHANGE UP (ref 0.5–1.3)
EGFR: 101 ML/MIN/1.73M2 — SIGNIFICANT CHANGE UP
EOSINOPHIL # BLD AUTO: 0.09 K/UL — SIGNIFICANT CHANGE UP (ref 0–0.5)
EOSINOPHIL NFR BLD AUTO: 1.3 % — SIGNIFICANT CHANGE UP (ref 0–6)
GLUCOSE SERPL-MCNC: 310 MG/DL — HIGH (ref 70–99)
HCT VFR BLD CALC: 37.3 % — SIGNIFICANT CHANGE UP (ref 34.5–45)
HGB BLD-MCNC: 13 G/DL — SIGNIFICANT CHANGE UP (ref 11.5–15.5)
IMM GRANULOCYTES NFR BLD AUTO: 0.4 % — SIGNIFICANT CHANGE UP (ref 0–0.9)
LYMPHOCYTES # BLD AUTO: 0.57 K/UL — LOW (ref 1–3.3)
LYMPHOCYTES # BLD AUTO: 8.1 % — LOW (ref 13–44)
MCHC RBC-ENTMCNC: 32.2 PG — SIGNIFICANT CHANGE UP (ref 27–34)
MCHC RBC-ENTMCNC: 34.9 GM/DL — SIGNIFICANT CHANGE UP (ref 32–36)
MCV RBC AUTO: 92.3 FL — SIGNIFICANT CHANGE UP (ref 80–100)
MONOCYTES # BLD AUTO: 0.59 K/UL — SIGNIFICANT CHANGE UP (ref 0–0.9)
MONOCYTES NFR BLD AUTO: 8.3 % — SIGNIFICANT CHANGE UP (ref 2–14)
NEUTROPHILS # BLD AUTO: 5.76 K/UL — SIGNIFICANT CHANGE UP (ref 1.8–7.4)
NEUTROPHILS NFR BLD AUTO: 81.5 % — HIGH (ref 43–77)
NRBC # BLD: 0 /100 WBCS — SIGNIFICANT CHANGE UP (ref 0–0)
PLATELET # BLD AUTO: 392 K/UL — SIGNIFICANT CHANGE UP (ref 150–400)
POTASSIUM SERPL-MCNC: 4 MMOL/L — SIGNIFICANT CHANGE UP (ref 3.5–5.3)
POTASSIUM SERPL-SCNC: 4 MMOL/L — SIGNIFICANT CHANGE UP (ref 3.5–5.3)
PROT SERPL-MCNC: 7 G/DL — SIGNIFICANT CHANGE UP (ref 6–8.3)
RBC # BLD: 4.04 M/UL — SIGNIFICANT CHANGE UP (ref 3.8–5.2)
RBC # FLD: 12.8 % — SIGNIFICANT CHANGE UP (ref 10.3–14.5)
SODIUM SERPL-SCNC: 135 MMOL/L — SIGNIFICANT CHANGE UP (ref 135–145)
WBC # BLD: 7.07 K/UL — SIGNIFICANT CHANGE UP (ref 3.8–10.5)
WBC # FLD AUTO: 7.07 K/UL — SIGNIFICANT CHANGE UP (ref 3.8–10.5)

## 2022-10-11 PROCEDURE — 99213 OFFICE O/P EST LOW 20 MIN: CPT

## 2022-10-12 DIAGNOSIS — Z51.12 ENCOUNTER FOR ANTINEOPLASTIC IMMUNOTHERAPY: ICD-10-CM

## 2022-10-25 ENCOUNTER — OUTPATIENT (OUTPATIENT)
Dept: OUTPATIENT SERVICES | Facility: HOSPITAL | Age: 67
LOS: 1 days | Discharge: HOME | End: 2022-10-25

## 2022-10-25 DIAGNOSIS — C54.1 MALIGNANT NEOPLASM OF ENDOMETRIUM: ICD-10-CM

## 2022-11-01 ENCOUNTER — RESULT REVIEW (OUTPATIENT)
Age: 67
End: 2022-11-01

## 2022-11-01 ENCOUNTER — RESULT CHARGE (OUTPATIENT)
Age: 67
End: 2022-11-01

## 2022-11-01 ENCOUNTER — APPOINTMENT (OUTPATIENT)
Dept: INFUSION THERAPY | Facility: CLINIC | Age: 67
End: 2022-11-01

## 2022-11-01 LAB — 24R-OH-CALCIDIOL SERPL-MCNC: 52.4 NG/ML — SIGNIFICANT CHANGE UP (ref 30–80)

## 2022-11-02 DIAGNOSIS — Z51.11 ENCOUNTER FOR ANTINEOPLASTIC CHEMOTHERAPY: ICD-10-CM

## 2022-11-05 NOTE — CONSULT LETTER
Spontaneous, unlabored and symmetrical [Consult Letter:] : I had the pleasure of evaluating your patient, [unfilled]. [Please see my note below.] : Please see my note below. [Consult Closing:] : Thank you very much for allowing me to participate in the care of this patient.  If you have any questions, please do not hesitate to contact me. [Sincerely,] : Sincerely, [DrLayton  ___] : Dr. HURLEY [___] : [unfilled] [Dear  ___] : Dear  [unfilled], [FreeTextEntry2] : Job Montes De Oca M.D. [FreeTextEntry3] : DAVION STUBBS M.D.\par Medical Oncology\par [facility]\par KRISTEL Amos\par

## 2022-11-22 ENCOUNTER — RESULT REVIEW (OUTPATIENT)
Age: 67
End: 2022-11-22

## 2022-11-22 ENCOUNTER — APPOINTMENT (OUTPATIENT)
Dept: INFUSION THERAPY | Facility: CLINIC | Age: 67
End: 2022-11-22

## 2022-11-22 ENCOUNTER — RESULT CHARGE (OUTPATIENT)
Age: 67
End: 2022-11-22

## 2022-11-22 LAB
ALBUMIN SERPL ELPH-MCNC: 4.6 G/DL — SIGNIFICANT CHANGE UP (ref 3.3–5)
ALP SERPL-CCNC: 131 U/L — HIGH (ref 40–120)
ALT FLD-CCNC: 22 U/L — SIGNIFICANT CHANGE UP (ref 10–45)
ANION GAP SERPL CALC-SCNC: 14 MMOL/L — SIGNIFICANT CHANGE UP (ref 5–17)
AST SERPL-CCNC: 17 U/L — SIGNIFICANT CHANGE UP (ref 10–40)
BASOPHILS # BLD AUTO: 0.04 K/UL — SIGNIFICANT CHANGE UP (ref 0–0.2)
BASOPHILS NFR BLD AUTO: 0.5 % — SIGNIFICANT CHANGE UP (ref 0–2)
BILIRUB SERPL-MCNC: 0.5 MG/DL — SIGNIFICANT CHANGE UP (ref 0.2–1.2)
BUN SERPL-MCNC: 15 MG/DL — SIGNIFICANT CHANGE UP (ref 7–23)
CALCIUM SERPL-MCNC: 9.9 MG/DL — SIGNIFICANT CHANGE UP (ref 8.4–10.5)
CANCER AG125 SERPL-ACNC: 450 U/ML — HIGH
CHLORIDE SERPL-SCNC: 103 MMOL/L — SIGNIFICANT CHANGE UP (ref 96–108)
CO2 SERPL-SCNC: 24 MMOL/L — SIGNIFICANT CHANGE UP (ref 22–31)
CREAT SERPL-MCNC: 0.62 MG/DL — SIGNIFICANT CHANGE UP (ref 0.5–1.3)
EGFR: 98 ML/MIN/1.73M2 — SIGNIFICANT CHANGE UP
EOSINOPHIL # BLD AUTO: 0.13 K/UL — SIGNIFICANT CHANGE UP (ref 0–0.5)
EOSINOPHIL NFR BLD AUTO: 1.6 % — SIGNIFICANT CHANGE UP (ref 0–6)
GLUCOSE SERPL-MCNC: 113 MG/DL — HIGH (ref 70–99)
HCT VFR BLD CALC: 40.3 % — SIGNIFICANT CHANGE UP (ref 34.5–45)
HGB BLD-MCNC: 14 G/DL — SIGNIFICANT CHANGE UP (ref 11.5–15.5)
IMM GRANULOCYTES NFR BLD AUTO: 0.4 % — SIGNIFICANT CHANGE UP (ref 0–0.9)
LYMPHOCYTES # BLD AUTO: 0.91 K/UL — LOW (ref 1–3.3)
LYMPHOCYTES # BLD AUTO: 11.2 % — LOW (ref 13–44)
MCHC RBC-ENTMCNC: 32.2 PG — SIGNIFICANT CHANGE UP (ref 27–34)
MCHC RBC-ENTMCNC: 34.7 GM/DL — SIGNIFICANT CHANGE UP (ref 32–36)
MCV RBC AUTO: 92.6 FL — SIGNIFICANT CHANGE UP (ref 80–100)
MONOCYTES # BLD AUTO: 0.51 K/UL — SIGNIFICANT CHANGE UP (ref 0–0.9)
MONOCYTES NFR BLD AUTO: 6.3 % — SIGNIFICANT CHANGE UP (ref 2–14)
NEUTROPHILS # BLD AUTO: 6.54 K/UL — SIGNIFICANT CHANGE UP (ref 1.8–7.4)
NEUTROPHILS NFR BLD AUTO: 80 % — HIGH (ref 43–77)
NRBC # BLD: 0 /100 WBCS — SIGNIFICANT CHANGE UP (ref 0–0)
PLATELET # BLD AUTO: 418 K/UL — HIGH (ref 150–400)
POTASSIUM SERPL-MCNC: 4.2 MMOL/L — SIGNIFICANT CHANGE UP (ref 3.5–5.3)
POTASSIUM SERPL-SCNC: 4.2 MMOL/L — SIGNIFICANT CHANGE UP (ref 3.5–5.3)
PROT SERPL-MCNC: 7.4 G/DL — SIGNIFICANT CHANGE UP (ref 6–8.3)
RBC # BLD: 4.35 M/UL — SIGNIFICANT CHANGE UP (ref 3.8–5.2)
RBC # FLD: 12.4 % — SIGNIFICANT CHANGE UP (ref 10.3–14.5)
SODIUM SERPL-SCNC: 141 MMOL/L — SIGNIFICANT CHANGE UP (ref 135–145)
T4 FREE+ TSH PNL SERPL: 0.98 UIU/ML — SIGNIFICANT CHANGE UP (ref 0.27–4.2)
WBC # BLD: 8.16 K/UL — SIGNIFICANT CHANGE UP (ref 3.8–10.5)
WBC # FLD AUTO: 8.16 K/UL — SIGNIFICANT CHANGE UP (ref 3.8–10.5)

## 2022-12-13 ENCOUNTER — APPOINTMENT (OUTPATIENT)
Dept: INFUSION THERAPY | Facility: CLINIC | Age: 67
End: 2022-12-13

## 2022-12-13 ENCOUNTER — RESULT CHARGE (OUTPATIENT)
Age: 67
End: 2022-12-13

## 2022-12-13 VITALS
OXYGEN SATURATION: 99 % | SYSTOLIC BLOOD PRESSURE: 158 MMHG | TEMPERATURE: 98.2 F | HEART RATE: 106 BPM | RESPIRATION RATE: 17 BRPM | BODY MASS INDEX: 23.04 KG/M2 | DIASTOLIC BLOOD PRESSURE: 83 MMHG | WEIGHT: 114.06 LBS

## 2022-12-22 ENCOUNTER — OUTPATIENT (OUTPATIENT)
Dept: OUTPATIENT SERVICES | Facility: HOSPITAL | Age: 67
LOS: 1 days | Discharge: HOME | End: 2022-12-22

## 2022-12-22 DIAGNOSIS — C54.1 MALIGNANT NEOPLASM OF ENDOMETRIUM: ICD-10-CM

## 2023-01-03 ENCOUNTER — RESULT CHARGE (OUTPATIENT)
Age: 68
End: 2023-01-03

## 2023-01-03 ENCOUNTER — RESULT REVIEW (OUTPATIENT)
Age: 68
End: 2023-01-03

## 2023-01-03 ENCOUNTER — APPOINTMENT (OUTPATIENT)
Dept: HEMATOLOGY ONCOLOGY | Facility: CLINIC | Age: 68
End: 2023-01-03
Payer: MEDICARE

## 2023-01-03 ENCOUNTER — APPOINTMENT (OUTPATIENT)
Dept: INFUSION THERAPY | Facility: CLINIC | Age: 68
End: 2023-01-03
Payer: MEDICARE

## 2023-01-03 VITALS
HEART RATE: 126 BPM | OXYGEN SATURATION: 100 % | RESPIRATION RATE: 18 BRPM | TEMPERATURE: 97.9 F | BODY MASS INDEX: 22.65 KG/M2 | WEIGHT: 112.13 LBS | DIASTOLIC BLOOD PRESSURE: 84 MMHG | SYSTOLIC BLOOD PRESSURE: 149 MMHG

## 2023-01-03 LAB
ALBUMIN SERPL ELPH-MCNC: 4.3 G/DL — SIGNIFICANT CHANGE UP (ref 3.3–5)
ALP SERPL-CCNC: 116 U/L — SIGNIFICANT CHANGE UP (ref 40–120)
ALT FLD-CCNC: 17 U/L — SIGNIFICANT CHANGE UP (ref 10–45)
ANION GAP SERPL CALC-SCNC: 13 MMOL/L — SIGNIFICANT CHANGE UP (ref 5–17)
AST SERPL-CCNC: 20 U/L — SIGNIFICANT CHANGE UP (ref 10–40)
BASOPHILS # BLD AUTO: 0.04 K/UL — SIGNIFICANT CHANGE UP (ref 0–0.2)
BASOPHILS NFR BLD AUTO: 0.5 % — SIGNIFICANT CHANGE UP (ref 0–2)
BILIRUB SERPL-MCNC: 0.5 MG/DL — SIGNIFICANT CHANGE UP (ref 0.2–1.2)
BUN SERPL-MCNC: 15 MG/DL — SIGNIFICANT CHANGE UP (ref 7–23)
CALCIUM SERPL-MCNC: 9.6 MG/DL — SIGNIFICANT CHANGE UP (ref 8.4–10.5)
CANCER AG125 SERPL-ACNC: 548 U/ML — HIGH
CHLORIDE SERPL-SCNC: 98 MMOL/L — SIGNIFICANT CHANGE UP (ref 96–108)
CO2 SERPL-SCNC: 24 MMOL/L — SIGNIFICANT CHANGE UP (ref 22–31)
CREAT SERPL-MCNC: 0.51 MG/DL — SIGNIFICANT CHANGE UP (ref 0.5–1.3)
EGFR: 102 ML/MIN/1.73M2 — SIGNIFICANT CHANGE UP
EOSINOPHIL # BLD AUTO: 0.09 K/UL — SIGNIFICANT CHANGE UP (ref 0–0.5)
EOSINOPHIL NFR BLD AUTO: 1.2 % — SIGNIFICANT CHANGE UP (ref 0–6)
GLUCOSE SERPL-MCNC: 277 MG/DL — HIGH (ref 70–99)
HCT VFR BLD CALC: 40.9 % — SIGNIFICANT CHANGE UP (ref 34.5–45)
HGB BLD-MCNC: 13.8 G/DL — SIGNIFICANT CHANGE UP (ref 11.5–15.5)
IMM GRANULOCYTES NFR BLD AUTO: 0.3 % — SIGNIFICANT CHANGE UP (ref 0–0.9)
LYMPHOCYTES # BLD AUTO: 0.66 K/UL — LOW (ref 1–3.3)
LYMPHOCYTES # BLD AUTO: 8.9 % — LOW (ref 13–44)
MCHC RBC-ENTMCNC: 30.8 PG — SIGNIFICANT CHANGE UP (ref 27–34)
MCHC RBC-ENTMCNC: 33.7 GM/DL — SIGNIFICANT CHANGE UP (ref 32–36)
MCV RBC AUTO: 91.3 FL — SIGNIFICANT CHANGE UP (ref 80–100)
MONOCYTES # BLD AUTO: 0.58 K/UL — SIGNIFICANT CHANGE UP (ref 0–0.9)
MONOCYTES NFR BLD AUTO: 7.8 % — SIGNIFICANT CHANGE UP (ref 2–14)
NEUTROPHILS # BLD AUTO: 6.05 K/UL — SIGNIFICANT CHANGE UP (ref 1.8–7.4)
NEUTROPHILS NFR BLD AUTO: 81.3 % — HIGH (ref 43–77)
NRBC # BLD: 0 /100 WBCS — SIGNIFICANT CHANGE UP (ref 0–0)
PLATELET # BLD AUTO: 435 K/UL — HIGH (ref 150–400)
POTASSIUM SERPL-MCNC: 4.1 MMOL/L — SIGNIFICANT CHANGE UP (ref 3.5–5.3)
POTASSIUM SERPL-SCNC: 4.1 MMOL/L — SIGNIFICANT CHANGE UP (ref 3.5–5.3)
PROT SERPL-MCNC: 7.7 G/DL — SIGNIFICANT CHANGE UP (ref 6–8.3)
RBC # BLD: 4.48 M/UL — SIGNIFICANT CHANGE UP (ref 3.8–5.2)
RBC # FLD: 12.3 % — SIGNIFICANT CHANGE UP (ref 10.3–14.5)
SODIUM SERPL-SCNC: 135 MMOL/L — SIGNIFICANT CHANGE UP (ref 135–145)
WBC # BLD: 7.44 K/UL — SIGNIFICANT CHANGE UP (ref 3.8–10.5)
WBC # FLD AUTO: 7.44 K/UL — SIGNIFICANT CHANGE UP (ref 3.8–10.5)

## 2023-01-03 PROCEDURE — 99214 OFFICE O/P EST MOD 30 MIN: CPT

## 2023-01-03 NOTE — HISTORY OF PRESENT ILLNESS
[Disease: _____________________] : Disease: [unfilled] [AJCC Stage: ____] : AJCC Stage: [unfilled] [de-identified] : 65 years old postmenopausal  female , with stage III C poorly differentiated, endometrioid carcinoma of the uterus diagnosed in 2018.\par \par CASE SYNOPSIS:\par \par 2018:\par Cervical mass biopsy – poorly differentiated carcinoma, favoring endometrial origin.\par \par 2018:\par Pelvic US (performed at [facility]): uterus size 9.9 x 5.3x 4.4 cm, endometrial mucosa thickness 24 mm, cervix with lobular appearance and heterogenous echo texture. Adnexae unremarkable, but a heterogenous solid  mass, measuring 3.8 x 3.6 x 2.8 cm, is described adjacent to the right ovary.\par \par 2018:\par CT C/A/P: No evidence of distant metastases; significant distension endometrial cavity (16 mm in size), extending into enlarged bulky cervix, with questionable proximal vaginal extension. Significant, diffuse retroperitoneal and pelvic lymphadenopathy ( lymph nodes in right pelvis abutting the right ovary, as seen on pelvis ultrasound.\par \par 2018:\par Pathology review [facility]: poorly differentiated, endometrioid adenocarcinoma; IHC non- contributory; cannot distinguished cervical vs endometrial origin.\par \par 2018:\par Started  Taxol/Carbo.\par \par 2019: \par Foundation One study consistent with MSI- H, TBM- high status, no other actionable target gene alterations.\par \par 2019:\par PET/CT - decrease in FDG avidity in both the endometrial cavity, and in the retroperitoneal and abdominopelvic lymphadenopathy. Unchanged size  of bilateral retrocrural LAD.\par Multiple FDG avid left axillary and retropectoral lymph nodes (? new)- for which FNA recommended.\par Unchanged, minimally changed FDG avid nodular opacity RML.\par \par 19:\par Mammography: Area of architectural distortion central left breast, correlating with US -described lobulated hypoechoic mass left breast 12:00, 5 cm from the nipple.\par Breast ultrasound:\par Left breast 12:00 5 cm from the nipple lobulated hypoechoic mass with associated architectural distortion. Multiple abnormal left axillary lymph nodes with focal cortical thickening. Ultrasound-guided core biopsy recommended for both breast mass and axillary lymph node. \par A 12:00 retroareolar 0.4 cm intraductal mass, for which ultrasound guided core biopsy recommended.\par \par 2019:\par Patient developed rash on upper extremities after D1, cycle #6 Carboplatin; chemotherapy discontinued.\par \par 2019:\par US –guided left breast biopsy – pathology breast consistent with intraductal papilloma left breast with ductal hyperplasia, adenosis, apocrine metaplasia. \par Left axillary LN biopsy – metastatic adenocarcinoma with papillary features, compatible with metastatic disease from known endometrial primary.\par \par 19- started Pembrolizumab.\par \par 10/1/19- admitted at [facility] with KATHRYN and DKA; diagnosed with type I diabetes, and started insulin sliding scale.\par \par 10/1/19: CT C/A/P-overall improvement in disease burden in the uterus and lymph nodes compared to 18. \par Persistent 1.2 cm nodular opacity along the right major fissure. No acute chest pathology, no PE.\par \par 2020 CT C/A/P-progression of endometrioid carcinoma of the uterus with increasing endometrial thickness and increasing soft tissue.  There now appears to be invasion of the myometrium.  Increasing pelvic lymphadenopathy.  Stable nodular opacity along the right major fissure.\par \par 2020: CT A/P–increasing right-sided pelvic adenopathy; increasing fluid distention of the endometrial cavity.  Stable opacity right lung, middle lobe\par \par –2020–received 3750 cGy to pelvis (Dr. Tapia).\par \par 2020- starts weekly  Abraxane in combination with pembrolizumab next on pembrolizumab maintenance.  \par \par CT CAP 22 : POD bulky L axillary and retropectoral adenopathy, new retroperitoneal and pelvic adenopathy   Ca 125 rising\par \par Did not want chemo. Agreed to pembro/ bevacizumab- started in Oct 2022.  \par  [de-identified] : poorly differentiated, endometrioid [de-identified] : Ca 125 was elevated at initial diagnosis [de-identified] : TMB  high\par FoundationOne done Jan 2019- multiple mutations : PALB2 PIK3CA  CHUY, and others  \par ER moderate  ( path April 2019 )  [de-identified] : feels well and has no new complaints. Only problem - chronic arthritic pains in knees- gets knee injections- help.\negrita Always very nervous in doctors office. States HR high because she is so stressed here when seeing a doctor. Fine when home. \negrita Wants to go to [place] for 2 weeks later this winter ( family there)

## 2023-01-03 NOTE — REVIEW OF SYSTEMS
[Patient Intake Form Reviewed] : Patient intake form was reviewed [Recent Change In Weight] : ~T recent weight change [Dysmenorrhea/Abn Vaginal Bleeding] : dysmenorrhea/abnormal vaginal bleeding [Insomnia] : insomnia [Anxiety] : anxiety [Negative] : Allergic/Immunologic [Chest Pain] : no chest pain [Lower Ext Edema] : no lower extremity edema [Shortness Of Breath] : no shortness of breath [Cough] : no cough [Abdominal Pain] : no abdominal pain [Vomiting] : no vomiting [Joint Pain] : joint pain [Skin Rash] : no skin rash [Easy Bleeding] : no tendency for easy bleeding [Easy Bruising] : no tendency for easy bruising [FreeTextEntry9] : per HPI

## 2023-01-03 NOTE — ASSESSMENT
[FreeTextEntry1] : 66 y/o female diagnosed in 2018   poorly diff endometrioid uterine cancer ( extensive metastatic retroperitoneal  VITO, later found to have metastatic  disease in axillary node and   pulm nodule )  [facility] dgn in 2018 ;  s/p Taxol/ catbo;  s/p Abraxane, on pembrolizumab since May 2019.\par \par Clinically doing well. Asymptomatic but scans in FAll 2022  showed disease progression- enlarging axillary retropectoral and retroperit pelvic VITO . \par \par Declined chemotherapy.\par \par Continues pembro and soham added in October.\par \par Asymptomatic Ca 125 stable/ down.\par \par Clinically doing well.\par continue current tx. Scans in 4-6 months/ sooner PRN.\par

## 2023-01-03 NOTE — PHYSICAL EXAM
[Fully active, able to carry on all pre-disease performance without restriction] : Status 0 - Fully active, able to carry on all pre-disease performance without restriction [Normal] : affect appropriate [de-identified] : minimal psoriatic rash on forearm  [de-identified] : anxious

## 2023-01-03 NOTE — REASON FOR VISIT
[Follow-Up Visit] : a follow-up [Family Member] : family member [FreeTextEntry2] : endometrioid uterine cancer on  pembrolizumab and bevacizumab

## 2023-01-04 DIAGNOSIS — Z51.11 ENCOUNTER FOR ANTINEOPLASTIC CHEMOTHERAPY: ICD-10-CM

## 2023-01-04 DIAGNOSIS — Z79.899 OTHER LONG TERM (CURRENT) DRUG THERAPY: ICD-10-CM

## 2023-01-24 ENCOUNTER — APPOINTMENT (OUTPATIENT)
Dept: INFUSION THERAPY | Facility: CLINIC | Age: 68
End: 2023-01-24

## 2023-02-14 ENCOUNTER — APPOINTMENT (OUTPATIENT)
Dept: INFUSION THERAPY | Facility: CLINIC | Age: 68
End: 2023-02-14

## 2023-02-14 ENCOUNTER — RESULT REVIEW (OUTPATIENT)
Age: 68
End: 2023-02-14

## 2023-02-14 ENCOUNTER — RESULT CHARGE (OUTPATIENT)
Age: 68
End: 2023-02-14

## 2023-02-14 LAB
ALBUMIN SERPL ELPH-MCNC: 4.4 G/DL — SIGNIFICANT CHANGE UP (ref 3.3–5)
ALP SERPL-CCNC: 94 U/L — SIGNIFICANT CHANGE UP (ref 40–120)
ALT FLD-CCNC: 21 U/L — SIGNIFICANT CHANGE UP (ref 10–45)
ANION GAP SERPL CALC-SCNC: 13 MMOL/L — SIGNIFICANT CHANGE UP (ref 5–17)
AST SERPL-CCNC: 20 U/L — SIGNIFICANT CHANGE UP (ref 10–40)
BASOPHILS # BLD AUTO: 0.04 K/UL — SIGNIFICANT CHANGE UP (ref 0–0.2)
BASOPHILS NFR BLD AUTO: 0.6 % — SIGNIFICANT CHANGE UP (ref 0–2)
BILIRUB SERPL-MCNC: 0.8 MG/DL — SIGNIFICANT CHANGE UP (ref 0.2–1.2)
BUN SERPL-MCNC: 14 MG/DL — SIGNIFICANT CHANGE UP (ref 7–23)
CALCIUM SERPL-MCNC: 9.9 MG/DL — SIGNIFICANT CHANGE UP (ref 8.4–10.5)
CANCER AG125 SERPL-ACNC: 568 U/ML — HIGH
CHLORIDE SERPL-SCNC: 96 MMOL/L — SIGNIFICANT CHANGE UP (ref 96–108)
CO2 SERPL-SCNC: 24 MMOL/L — SIGNIFICANT CHANGE UP (ref 22–31)
CREAT SERPL-MCNC: 0.49 MG/DL — LOW (ref 0.5–1.3)
EGFR: 103 ML/MIN/1.73M2 — SIGNIFICANT CHANGE UP
EOSINOPHIL # BLD AUTO: 0.03 K/UL — SIGNIFICANT CHANGE UP (ref 0–0.5)
EOSINOPHIL NFR BLD AUTO: 0.5 % — SIGNIFICANT CHANGE UP (ref 0–6)
GLUCOSE SERPL-MCNC: 244 MG/DL — HIGH (ref 70–99)
HCT VFR BLD CALC: 40.5 % — SIGNIFICANT CHANGE UP (ref 34.5–45)
HGB BLD-MCNC: 13.9 G/DL — SIGNIFICANT CHANGE UP (ref 11.5–15.5)
IMM GRANULOCYTES NFR BLD AUTO: 0.2 % — SIGNIFICANT CHANGE UP (ref 0–0.9)
LYMPHOCYTES # BLD AUTO: 0.79 K/UL — LOW (ref 1–3.3)
LYMPHOCYTES # BLD AUTO: 12.7 % — LOW (ref 13–44)
MCHC RBC-ENTMCNC: 31.2 PG — SIGNIFICANT CHANGE UP (ref 27–34)
MCHC RBC-ENTMCNC: 34.3 GM/DL — SIGNIFICANT CHANGE UP (ref 32–36)
MCV RBC AUTO: 91 FL — SIGNIFICANT CHANGE UP (ref 80–100)
MONOCYTES # BLD AUTO: 0.62 K/UL — SIGNIFICANT CHANGE UP (ref 0–0.9)
MONOCYTES NFR BLD AUTO: 10 % — SIGNIFICANT CHANGE UP (ref 2–14)
NEUTROPHILS # BLD AUTO: 4.74 K/UL — SIGNIFICANT CHANGE UP (ref 1.8–7.4)
NEUTROPHILS NFR BLD AUTO: 76 % — SIGNIFICANT CHANGE UP (ref 43–77)
NRBC # BLD: 0 /100 WBCS — SIGNIFICANT CHANGE UP (ref 0–0)
PLATELET # BLD AUTO: 310 K/UL — SIGNIFICANT CHANGE UP (ref 150–400)
POTASSIUM SERPL-MCNC: 3.9 MMOL/L — SIGNIFICANT CHANGE UP (ref 3.5–5.3)
POTASSIUM SERPL-SCNC: 3.9 MMOL/L — SIGNIFICANT CHANGE UP (ref 3.5–5.3)
PROT SERPL-MCNC: 7.2 G/DL — SIGNIFICANT CHANGE UP (ref 6–8.3)
RBC # BLD: 4.45 M/UL — SIGNIFICANT CHANGE UP (ref 3.8–5.2)
RBC # FLD: 13.1 % — SIGNIFICANT CHANGE UP (ref 10.3–14.5)
SODIUM SERPL-SCNC: 133 MMOL/L — LOW (ref 135–145)
T4 FREE+ TSH PNL SERPL: 0.84 UIU/ML — SIGNIFICANT CHANGE UP (ref 0.27–4.2)
WBC # BLD: 6.23 K/UL — SIGNIFICANT CHANGE UP (ref 3.8–10.5)
WBC # FLD AUTO: 6.23 K/UL — SIGNIFICANT CHANGE UP (ref 3.8–10.5)

## 2023-02-25 ENCOUNTER — OUTPATIENT (OUTPATIENT)
Dept: OUTPATIENT SERVICES | Facility: HOSPITAL | Age: 68
LOS: 1 days | Discharge: HOME | End: 2023-02-25

## 2023-02-25 DIAGNOSIS — C54.1 MALIGNANT NEOPLASM OF ENDOMETRIUM: ICD-10-CM

## 2023-03-07 ENCOUNTER — APPOINTMENT (OUTPATIENT)
Dept: INFUSION THERAPY | Facility: CLINIC | Age: 68
End: 2023-03-07

## 2023-03-07 VITALS
RESPIRATION RATE: 18 BRPM | WEIGHT: 112.31 LBS | TEMPERATURE: 98.8 F | HEART RATE: 104 BPM | DIASTOLIC BLOOD PRESSURE: 85 MMHG | BODY MASS INDEX: 22.68 KG/M2 | OXYGEN SATURATION: 100 % | SYSTOLIC BLOOD PRESSURE: 160 MMHG

## 2023-03-08 DIAGNOSIS — Z51.11 ENCOUNTER FOR ANTINEOPLASTIC CHEMOTHERAPY: ICD-10-CM

## 2023-03-28 ENCOUNTER — APPOINTMENT (OUTPATIENT)
Dept: INFUSION THERAPY | Facility: CLINIC | Age: 68
End: 2023-03-28

## 2023-03-28 ENCOUNTER — RESULT REVIEW (OUTPATIENT)
Age: 68
End: 2023-03-28

## 2023-03-28 ENCOUNTER — RESULT CHARGE (OUTPATIENT)
Age: 68
End: 2023-03-28

## 2023-03-28 LAB
ALBUMIN SERPL ELPH-MCNC: 4.5 G/DL — SIGNIFICANT CHANGE UP (ref 3.3–5)
ALP SERPL-CCNC: 102 U/L — SIGNIFICANT CHANGE UP (ref 40–120)
ALT FLD-CCNC: 28 U/L — SIGNIFICANT CHANGE UP (ref 10–45)
ANION GAP SERPL CALC-SCNC: 13 MMOL/L — SIGNIFICANT CHANGE UP (ref 5–17)
AST SERPL-CCNC: 21 U/L — SIGNIFICANT CHANGE UP (ref 10–40)
BASOPHILS # BLD AUTO: 0.03 K/UL — SIGNIFICANT CHANGE UP (ref 0–0.2)
BASOPHILS NFR BLD AUTO: 0.5 % — SIGNIFICANT CHANGE UP (ref 0–2)
BILIRUB SERPL-MCNC: 0.6 MG/DL — SIGNIFICANT CHANGE UP (ref 0.2–1.2)
BUN SERPL-MCNC: 11 MG/DL — SIGNIFICANT CHANGE UP (ref 7–23)
CALCIUM SERPL-MCNC: 9.4 MG/DL — SIGNIFICANT CHANGE UP (ref 8.4–10.5)
CANCER AG125 SERPL-ACNC: 595 U/ML — HIGH
CHLORIDE SERPL-SCNC: 101 MMOL/L — SIGNIFICANT CHANGE UP (ref 96–108)
CO2 SERPL-SCNC: 24 MMOL/L — SIGNIFICANT CHANGE UP (ref 22–31)
CREAT SERPL-MCNC: 0.47 MG/DL — LOW (ref 0.5–1.3)
EGFR: 104 ML/MIN/1.73M2 — SIGNIFICANT CHANGE UP
EOSINOPHIL # BLD AUTO: 0.04 K/UL — SIGNIFICANT CHANGE UP (ref 0–0.5)
EOSINOPHIL NFR BLD AUTO: 0.7 % — SIGNIFICANT CHANGE UP (ref 0–6)
GLUCOSE SERPL-MCNC: 155 MG/DL — HIGH (ref 70–99)
HCT VFR BLD CALC: 40.1 % — SIGNIFICANT CHANGE UP (ref 34.5–45)
HGB BLD-MCNC: 13.6 G/DL — SIGNIFICANT CHANGE UP (ref 11.5–15.5)
IMM GRANULOCYTES NFR BLD AUTO: 0.3 % — SIGNIFICANT CHANGE UP (ref 0–0.9)
LYMPHOCYTES # BLD AUTO: 0.61 K/UL — LOW (ref 1–3.3)
LYMPHOCYTES # BLD AUTO: 9.9 % — LOW (ref 13–44)
MCHC RBC-ENTMCNC: 31.1 PG — SIGNIFICANT CHANGE UP (ref 27–34)
MCHC RBC-ENTMCNC: 33.9 GM/DL — SIGNIFICANT CHANGE UP (ref 32–36)
MCV RBC AUTO: 91.8 FL — SIGNIFICANT CHANGE UP (ref 80–100)
MONOCYTES # BLD AUTO: 0.47 K/UL — SIGNIFICANT CHANGE UP (ref 0–0.9)
MONOCYTES NFR BLD AUTO: 7.6 % — SIGNIFICANT CHANGE UP (ref 2–14)
NEUTROPHILS # BLD AUTO: 4.98 K/UL — SIGNIFICANT CHANGE UP (ref 1.8–7.4)
NEUTROPHILS NFR BLD AUTO: 81 % — HIGH (ref 43–77)
NRBC # BLD: 0 /100 WBCS — SIGNIFICANT CHANGE UP (ref 0–0)
PLATELET # BLD AUTO: 303 K/UL — SIGNIFICANT CHANGE UP (ref 150–400)
POTASSIUM SERPL-MCNC: 4.1 MMOL/L — SIGNIFICANT CHANGE UP (ref 3.5–5.3)
POTASSIUM SERPL-SCNC: 4.1 MMOL/L — SIGNIFICANT CHANGE UP (ref 3.5–5.3)
PROT SERPL-MCNC: 6.7 G/DL — SIGNIFICANT CHANGE UP (ref 6–8.3)
RBC # BLD: 4.37 M/UL — SIGNIFICANT CHANGE UP (ref 3.8–5.2)
RBC # FLD: 13.3 % — SIGNIFICANT CHANGE UP (ref 10.3–14.5)
SODIUM SERPL-SCNC: 137 MMOL/L — SIGNIFICANT CHANGE UP (ref 135–145)
WBC # BLD: 6.15 K/UL — SIGNIFICANT CHANGE UP (ref 3.8–10.5)
WBC # FLD AUTO: 6.15 K/UL — SIGNIFICANT CHANGE UP (ref 3.8–10.5)

## 2023-04-17 NOTE — REVIEW OF SYSTEMS
[Patient Intake Form Reviewed] : Patient intake form was reviewed [Joint Pain] : joint pain [Insomnia] : insomnia [Anxiety] : anxiety [Negative] : Allergic/Immunologic

## 2023-04-18 ENCOUNTER — APPOINTMENT (OUTPATIENT)
Dept: HEMATOLOGY ONCOLOGY | Facility: CLINIC | Age: 68
End: 2023-04-18
Payer: MEDICARE

## 2023-04-18 ENCOUNTER — APPOINTMENT (OUTPATIENT)
Dept: INFUSION THERAPY | Facility: CLINIC | Age: 68
End: 2023-04-18
Payer: MEDICARE

## 2023-04-18 DIAGNOSIS — R97.1 ELEVATED CANCER ANTIGEN 125 [CA 125]: ICD-10-CM

## 2023-04-18 DIAGNOSIS — Z79.899 OTHER LONG TERM (CURRENT) DRUG THERAPY: ICD-10-CM

## 2023-04-18 DIAGNOSIS — Z51.12 ENCOUNTER FOR ANTINEOPLASTIC IMMUNOTHERAPY: ICD-10-CM

## 2023-04-18 PROCEDURE — 99213 OFFICE O/P EST LOW 20 MIN: CPT

## 2023-04-18 NOTE — PHYSICAL EXAM
[Normal] : affect appropriate [de-identified] : minimal psoriatic rash on forearm  [de-identified] : anxious

## 2023-04-18 NOTE — REASON FOR VISIT
[Follow-Up Visit] : a follow-up [Post Treatment Visit] : a post treatment [FreeTextEntry2] : Uterine cancer - pembrolizumab and bevacizumab since 10/2022

## 2023-04-18 NOTE — HISTORY OF PRESENT ILLNESS
[Disease: _____________________] : Disease: [unfilled] [AJCC Stage: ____] : AJCC Stage: [unfilled] [de-identified] : STACEY TIM is a 66 y.o. F who we are following for a stage IV endometrial cancer (axillary, retropectoral LN's)\par \par 11/7/18 -The patient presented with a 4 - 5 month hx of PMB and saw GYN Dr. Montes De Oca (prior GYN visit >9 years ago). Exam showed large cervical mass.\par Biopsy - poorly differentiated carcinoma favoring endometrial origin.   [facility] review of slides favored endometrioid adenocarcinoma, but could not distinguish between endometrial or cervical carcinoma. \par [facility] - MSI-H, TMB 34 (high), else no reportable alterations. Multiple other alterations.   \par CT C/A/P - No distant mets; significant distention endometrial cavity (16mm) extending into an enlarged bulky cervix, with questionable proximal vaginal extension.  Significant, diffuse retroperitoneal and pelvic LAD (LN's in right pelvis abutting the right ovary).\par \par 12/6/18 - 4/18/19 - Started Taxol/Carbo.  Initially given Q21, then weekly.  Patient then had a Carbo reaction D1 C6. Chemo held.  \par Responded well - Ca125 on 11/29/18 was 269 - jeremiah was 42 on 2/22/19.\par 3/30/19 - PET/CT - decrease in FDG avidity in both the endometrial cavity and in the retroperitoneal and abdominopelvic LAD. Unchanged size of bilateral retrocrural LAD. Unchanged, minimally changed FDG avid nodular opacity RML.  Multiple FDG avid left axillary and retropectoral LN's (unchanged in size when c/w 11/16/18).\par Axillary lymphadenopathy felt to be unusual and so this was evaluated.  \par \par 4/12/19 - Mammo/Sono:  ~ 1.1cm left breast mass at 12:00 and  0.4 cm left breast retroareolar intraductal mass.  Multiple abnormal left axillary LN's with focal cortical thickening.  \par 4/29/19 - Left breast biopsies - benign.  Left axillary LN biopsy - metastatic adenocarcinoma with papillary features, compatible with metastatic disease from known endometrial primary.\par \par 5/16/19 - Started on Keytruda.  Ca125 was 163 at initiation, quickly normalized.  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  CT C/A/P - overall improvement in disease burden in uterus and LN's when c/w 11/16/18.  Persistent 1.2cm nodular opacity along right major fissure.\par 1/23/20 - Patient had mild increase in Ca125 to 44.  \par 2/24/20 - CT C/A/P - Stable nodular opacity along the RIGHT major fissure. Progression of endometrioid carcinoma the uterus with increasing endometrial thickness and \par increasing soft tissue. There now appears to be invasion of the myometrium. Increasing pelvic lymphadenopathy. \par 4/28/20 - CT C/A/P - Stable opacity in RML, stable 1.0cm right iliac chain LN, and 0.7cm obturator LN.  Increase in right internal iliac chain LN (was 0.9cm, now 1.6cm).  Increase in fluid density of endometrial cavity (was 2.1cm, now 3.1cm).  \par 5/18/20 - 6/9/20 - pelvic RT x 15 fractions (Shabnam Tapia)\par \par 6/23/20 -  Weekly Abraxane and Keytruda -> Keytruda maintenance.\par \par 9/13/22 - CT C/A/P - POD with bulky left axillary and retropectoral adenopathy, new retroperitoneal and pelvic adenopathy.\par Ca125 had been rising since February 2022 (patient had declined scans until recently). \par Did not want chemo. \par 10/2022 - Added Bevacizumab to Pembrolizumab. Ca125 554.  Ca125 has been ess stable in 500 range since starting on Miesha.  [de-identified] : poorly differentiated, endometrioid [de-identified] : Ca 125 was elevated at initial diagnosis [de-identified] : TMB  high\par FoundationOne done Jan 2019- multiple mutations : PALB2 PIK3CA  CHUY, and others  \par ER moderate  ( path April 2019 )  [de-identified] : Patient reports she is doing well.  Has no complaints.  \par Continues on Olumiant for psoriatic arthritis. \par Frustrated by Q3 weeks dosing as would like to spent more time in [place]  (has family there).  \par \par Ca125 has been stable in 500 range since starting on Miesha. \par Denies any changes in her family, medical, or social history since her last visit of 1/3/23.

## 2023-04-18 NOTE — ASSESSMENT
[FreeTextEntry1] : Patient is a 67 y.o. with a stage IV endometrial carcinoma diagnosed in 2018 (poorly diff endometrioid uterine cancer) - extensive metastatic retroperitoneal VITO, later found to have metastatic disease in axillary node and   pulm nodule).  [facility].\par s/p Taxol/Carbo; \par s/p Abraxane, \par on pembrolizumab since May 2019.\par Fall 2022 - scans with POD showing enlarging VITO. \par Patient asymptomatic.  Declined chemotherapy.\par 10/2022 - Bevacizumab added to Pembro. \par Ca125 now stable in 500 range.  \par \par Clinically doing well.  Patient declining routine scans. Discussed with follow Ca125.  Should there be a significant increase or should she have new symptoms we would need to do a scat that time. \par  \par \par Dr. Job Montes De Oca\par Dr. Olivia Alvarez\par Dr. Shabnam Tapia\par

## 2023-05-02 ENCOUNTER — OUTPATIENT (OUTPATIENT)
Dept: OUTPATIENT SERVICES | Facility: HOSPITAL | Age: 68
LOS: 1 days | Discharge: HOME | End: 2023-05-02

## 2023-05-02 DIAGNOSIS — C54.1 MALIGNANT NEOPLASM OF ENDOMETRIUM: ICD-10-CM

## 2023-05-09 ENCOUNTER — RESULT CHARGE (OUTPATIENT)
Age: 68
End: 2023-05-09

## 2023-05-09 ENCOUNTER — RESULT REVIEW (OUTPATIENT)
Age: 68
End: 2023-05-09

## 2023-05-09 ENCOUNTER — APPOINTMENT (OUTPATIENT)
Dept: INFUSION THERAPY | Facility: CLINIC | Age: 68
End: 2023-05-09

## 2023-05-09 LAB
ALBUMIN SERPL ELPH-MCNC: 4.3 G/DL — SIGNIFICANT CHANGE UP (ref 3.3–5)
ALP SERPL-CCNC: 112 U/L — SIGNIFICANT CHANGE UP (ref 40–120)
ALT FLD-CCNC: 29 U/L — SIGNIFICANT CHANGE UP (ref 10–45)
ANION GAP SERPL CALC-SCNC: 11 MMOL/L — SIGNIFICANT CHANGE UP (ref 5–17)
AST SERPL-CCNC: 24 U/L — SIGNIFICANT CHANGE UP (ref 10–40)
BASOPHILS # BLD AUTO: 0.03 K/UL — SIGNIFICANT CHANGE UP (ref 0–0.2)
BASOPHILS NFR BLD AUTO: 0.4 % — SIGNIFICANT CHANGE UP (ref 0–2)
BILIRUB SERPL-MCNC: 0.7 MG/DL — SIGNIFICANT CHANGE UP (ref 0.2–1.2)
BUN SERPL-MCNC: 12 MG/DL — SIGNIFICANT CHANGE UP (ref 7–23)
CALCIUM SERPL-MCNC: 10 MG/DL — SIGNIFICANT CHANGE UP (ref 8.4–10.5)
CANCER AG125 SERPL-ACNC: 674 U/ML — HIGH
CHLORIDE SERPL-SCNC: 99 MMOL/L — SIGNIFICANT CHANGE UP (ref 96–108)
CO2 SERPL-SCNC: 23 MMOL/L — SIGNIFICANT CHANGE UP (ref 22–31)
CREAT SERPL-MCNC: 0.51 MG/DL — SIGNIFICANT CHANGE UP (ref 0.5–1.3)
EGFR: 102 ML/MIN/1.73M2 — SIGNIFICANT CHANGE UP
EOSINOPHIL # BLD AUTO: 0.05 K/UL — SIGNIFICANT CHANGE UP (ref 0–0.5)
EOSINOPHIL NFR BLD AUTO: 0.7 % — SIGNIFICANT CHANGE UP (ref 0–6)
GLUCOSE SERPL-MCNC: 296 MG/DL — HIGH (ref 70–99)
HCT VFR BLD CALC: 41.8 % — SIGNIFICANT CHANGE UP (ref 34.5–45)
HGB BLD-MCNC: 14.5 G/DL — SIGNIFICANT CHANGE UP (ref 11.5–15.5)
IMM GRANULOCYTES NFR BLD AUTO: 0.3 % — SIGNIFICANT CHANGE UP (ref 0–0.9)
LYMPHOCYTES # BLD AUTO: 0.81 K/UL — LOW (ref 1–3.3)
LYMPHOCYTES # BLD AUTO: 11.7 % — LOW (ref 13–44)
MCHC RBC-ENTMCNC: 31.4 PG — SIGNIFICANT CHANGE UP (ref 27–34)
MCHC RBC-ENTMCNC: 34.7 GM/DL — SIGNIFICANT CHANGE UP (ref 32–36)
MCV RBC AUTO: 90.5 FL — SIGNIFICANT CHANGE UP (ref 80–100)
MONOCYTES # BLD AUTO: 0.57 K/UL — SIGNIFICANT CHANGE UP (ref 0–0.9)
MONOCYTES NFR BLD AUTO: 8.2 % — SIGNIFICANT CHANGE UP (ref 2–14)
NEUTROPHILS # BLD AUTO: 5.46 K/UL — SIGNIFICANT CHANGE UP (ref 1.8–7.4)
NEUTROPHILS NFR BLD AUTO: 78.7 % — HIGH (ref 43–77)
NRBC # BLD: 0 /100 WBCS — SIGNIFICANT CHANGE UP (ref 0–0)
PLATELET # BLD AUTO: 298 K/UL — SIGNIFICANT CHANGE UP (ref 150–400)
POTASSIUM SERPL-MCNC: 3.8 MMOL/L — SIGNIFICANT CHANGE UP (ref 3.5–5.3)
POTASSIUM SERPL-SCNC: 3.8 MMOL/L — SIGNIFICANT CHANGE UP (ref 3.5–5.3)
PROT SERPL-MCNC: 7.6 G/DL — SIGNIFICANT CHANGE UP (ref 6–8.3)
RBC # BLD: 4.62 M/UL — SIGNIFICANT CHANGE UP (ref 3.8–5.2)
RBC # FLD: 12.8 % — SIGNIFICANT CHANGE UP (ref 10.3–14.5)
SODIUM SERPL-SCNC: 132 MMOL/L — LOW (ref 135–145)
T4 FREE+ TSH PNL SERPL: 0.91 UIU/ML — SIGNIFICANT CHANGE UP (ref 0.27–4.2)
WBC # BLD: 6.94 K/UL — SIGNIFICANT CHANGE UP (ref 3.8–10.5)
WBC # FLD AUTO: 6.94 K/UL — SIGNIFICANT CHANGE UP (ref 3.8–10.5)

## 2023-05-10 DIAGNOSIS — Z51.11 ENCOUNTER FOR ANTINEOPLASTIC CHEMOTHERAPY: ICD-10-CM

## 2023-05-30 ENCOUNTER — APPOINTMENT (OUTPATIENT)
Dept: INFUSION THERAPY | Facility: CLINIC | Age: 68
End: 2023-05-30

## 2023-05-30 VITALS
HEIGHT: 59 IN | OXYGEN SATURATION: 100 % | DIASTOLIC BLOOD PRESSURE: 72 MMHG | HEART RATE: 98 BPM | SYSTOLIC BLOOD PRESSURE: 120 MMHG | TEMPERATURE: 98.2 F | RESPIRATION RATE: 18 BRPM | WEIGHT: 107 LBS | BODY MASS INDEX: 21.57 KG/M2

## 2023-05-31 DIAGNOSIS — R11.2 NAUSEA WITH VOMITING, UNSPECIFIED: ICD-10-CM

## 2023-06-20 ENCOUNTER — RESULT REVIEW (OUTPATIENT)
Age: 68
End: 2023-06-20

## 2023-06-20 ENCOUNTER — APPOINTMENT (OUTPATIENT)
Dept: INFUSION THERAPY | Facility: CLINIC | Age: 68
End: 2023-06-20

## 2023-06-20 ENCOUNTER — RESULT CHARGE (OUTPATIENT)
Age: 68
End: 2023-06-20

## 2023-06-20 LAB
ALBUMIN SERPL ELPH-MCNC: 4 G/DL — SIGNIFICANT CHANGE UP (ref 3.3–5)
ALP SERPL-CCNC: 93 U/L — SIGNIFICANT CHANGE UP (ref 40–120)
ALT FLD-CCNC: 29 U/L — SIGNIFICANT CHANGE UP (ref 10–45)
ANION GAP SERPL CALC-SCNC: 12 MMOL/L — SIGNIFICANT CHANGE UP (ref 5–17)
AST SERPL-CCNC: 30 U/L — SIGNIFICANT CHANGE UP (ref 10–40)
BASOPHILS # BLD AUTO: 0.02 K/UL — SIGNIFICANT CHANGE UP (ref 0–0.2)
BASOPHILS NFR BLD AUTO: 0.3 % — SIGNIFICANT CHANGE UP (ref 0–2)
BILIRUB SERPL-MCNC: 0.7 MG/DL — SIGNIFICANT CHANGE UP (ref 0.2–1.2)
BUN SERPL-MCNC: 9 MG/DL — SIGNIFICANT CHANGE UP (ref 7–23)
CALCIUM SERPL-MCNC: 9.3 MG/DL — SIGNIFICANT CHANGE UP (ref 8.4–10.5)
CANCER AG125 SERPL-ACNC: 632 U/ML — HIGH
CHLORIDE SERPL-SCNC: 101 MMOL/L — SIGNIFICANT CHANGE UP (ref 96–108)
CO2 SERPL-SCNC: 23 MMOL/L — SIGNIFICANT CHANGE UP (ref 22–31)
CREAT SERPL-MCNC: 0.49 MG/DL — LOW (ref 0.5–1.3)
EGFR: 103 ML/MIN/1.73M2 — SIGNIFICANT CHANGE UP
EOSINOPHIL # BLD AUTO: 0.02 K/UL — SIGNIFICANT CHANGE UP (ref 0–0.5)
EOSINOPHIL NFR BLD AUTO: 0.3 % — SIGNIFICANT CHANGE UP (ref 0–6)
GLUCOSE SERPL-MCNC: 354 MG/DL — HIGH (ref 70–99)
HCT VFR BLD CALC: 40.3 % — SIGNIFICANT CHANGE UP (ref 34.5–45)
HGB BLD-MCNC: 13.6 G/DL — SIGNIFICANT CHANGE UP (ref 11.5–15.5)
IMM GRANULOCYTES NFR BLD AUTO: 0.3 % — SIGNIFICANT CHANGE UP (ref 0–0.9)
LYMPHOCYTES # BLD AUTO: 0.55 K/UL — LOW (ref 1–3.3)
LYMPHOCYTES # BLD AUTO: 9.2 % — LOW (ref 13–44)
MCHC RBC-ENTMCNC: 31.3 PG — SIGNIFICANT CHANGE UP (ref 27–34)
MCHC RBC-ENTMCNC: 33.7 GM/DL — SIGNIFICANT CHANGE UP (ref 32–36)
MCV RBC AUTO: 92.9 FL — SIGNIFICANT CHANGE UP (ref 80–100)
MONOCYTES # BLD AUTO: 0.42 K/UL — SIGNIFICANT CHANGE UP (ref 0–0.9)
MONOCYTES NFR BLD AUTO: 7 % — SIGNIFICANT CHANGE UP (ref 2–14)
NEUTROPHILS # BLD AUTO: 4.95 K/UL — SIGNIFICANT CHANGE UP (ref 1.8–7.4)
NEUTROPHILS NFR BLD AUTO: 82.9 % — HIGH (ref 43–77)
NRBC # BLD: 0 /100 WBCS — SIGNIFICANT CHANGE UP (ref 0–0)
PLATELET # BLD AUTO: 273 K/UL — SIGNIFICANT CHANGE UP (ref 150–400)
POTASSIUM SERPL-MCNC: 5 MMOL/L — SIGNIFICANT CHANGE UP (ref 3.5–5.3)
POTASSIUM SERPL-SCNC: 5 MMOL/L — SIGNIFICANT CHANGE UP (ref 3.5–5.3)
PROT SERPL-MCNC: 6.7 G/DL — SIGNIFICANT CHANGE UP (ref 6–8.3)
RBC # BLD: 4.34 M/UL — SIGNIFICANT CHANGE UP (ref 3.8–5.2)
RBC # FLD: 13.5 % — SIGNIFICANT CHANGE UP (ref 10.3–14.5)
SODIUM SERPL-SCNC: 136 MMOL/L — SIGNIFICANT CHANGE UP (ref 135–145)
WBC # BLD: 5.98 K/UL — SIGNIFICANT CHANGE UP (ref 3.8–10.5)
WBC # FLD AUTO: 5.98 K/UL — SIGNIFICANT CHANGE UP (ref 3.8–10.5)

## 2023-07-02 ENCOUNTER — OUTPATIENT (OUTPATIENT)
Dept: OUTPATIENT SERVICES | Facility: HOSPITAL | Age: 68
LOS: 1 days | Discharge: HOME | End: 2023-07-02

## 2023-07-02 DIAGNOSIS — C54.1 MALIGNANT NEOPLASM OF ENDOMETRIUM: ICD-10-CM

## 2023-07-11 ENCOUNTER — APPOINTMENT (OUTPATIENT)
Dept: HEMATOLOGY ONCOLOGY | Facility: CLINIC | Age: 68
End: 2023-07-11
Payer: MEDICARE

## 2023-07-11 ENCOUNTER — APPOINTMENT (OUTPATIENT)
Dept: INFUSION THERAPY | Facility: CLINIC | Age: 68
End: 2023-07-11
Payer: MEDICARE

## 2023-07-11 VITALS
RESPIRATION RATE: 18 BRPM | HEART RATE: 98 BPM | OXYGEN SATURATION: 100 % | SYSTOLIC BLOOD PRESSURE: 156 MMHG | TEMPERATURE: 98.1 F | DIASTOLIC BLOOD PRESSURE: 77 MMHG | WEIGHT: 105.44 LBS | BODY MASS INDEX: 21.3 KG/M2

## 2023-07-11 PROCEDURE — 99214 OFFICE O/P EST MOD 30 MIN: CPT

## 2023-07-11 NOTE — ASSESSMENT
[FreeTextEntry1] : 68 y/o female diagnosed in 2018   poorly diff endometrioid uterine cancer ( extensive metastatic retroperitoneal  VITO, later found to have metastatic  disease in axillary node and   pulm nodule )  [facility] d[facility] in 2018 ;  s/p Taxol/ catbo;  s/p Abraxane, on pembrolizumab since May 2019.\par \par Clinically doing well. Asymptomatic but scans in FAll 2022  showed disease progression- enlarging axillary retropectoral and retroperit pelvic VITO . \par \par Declined chemotherapy.\par \par Continues pembro and soham added in October 2022\par \par Asymptomatic Ca 125 stable/ down.\par \par Clinically doing well.\par continue current tx. \par She refused follow up scans but will agree if new symptoms or significant change in tumor markers. \par \par Exam in 3 months \par

## 2023-07-11 NOTE — HISTORY OF PRESENT ILLNESS
[Disease: _____________________] : Disease: [unfilled] [AJCC Stage: ____] : AJCC Stage: [unfilled] [de-identified] : 65 years old postmenopausal  female , with stage III C poorly differentiated, endometrioid carcinoma of the uterus diagnosed in 2018.\par \par CASE SYNOPSIS:\par \par 2018:\par Cervical mass biopsy – poorly differentiated carcinoma, favoring endometrial origin.\par \par 2018:\par Pelvic US (performed at [facility]): uterus size 9.9 x 5.3x 4.4 cm, endometrial mucosa thickness 24 mm, cervix with lobular appearance and heterogenous echo texture. Adnexae unremarkable, but a heterogenous solid  mass, measuring 3.8 x 3.6 x 2.8 cm, is described adjacent to the right ovary.\par \par 2018:\par CT C/A/P: No evidence of distant metastases; significant distension endometrial cavity (16 mm in size), extending into enlarged bulky cervix, with questionable proximal vaginal extension. Significant, diffuse retroperitoneal and pelvic lymphadenopathy ( lymph nodes in right pelvis abutting the right ovary, as seen on pelvis ultrasound.\par \par 2018:\par Pathology review [facility]: poorly differentiated, endometrioid adenocarcinoma; IHC non- contributory; cannot distinguished cervical vs endometrial origin.\par \par 2018:\par Started  Taxol/Carbo.\par \par 2019: \par Foundation One study consistent with MSI- H, TBM- high status, no other actionable target gene alterations.\par \par 2019:\par PET/CT - decrease in FDG avidity in both the endometrial cavity, and in the retroperitoneal and abdominopelvic lymphadenopathy. Unchanged size  of bilateral retrocrural LAD.\par Multiple FDG avid left axillary and retropectoral lymph nodes (? new)- for which FNA recommended.\par Unchanged, minimally changed FDG avid nodular opacity RML.\par \par 19:\par Mammography: Area of architectural distortion central left breast, correlating with US -described lobulated hypoechoic mass left breast 12:00, 5 cm from the nipple.\par Breast ultrasound:\par Left breast 12:00 5 cm from the nipple lobulated hypoechoic mass with associated architectural distortion. Multiple abnormal left axillary lymph nodes with focal cortical thickening. Ultrasound-guided core biopsy recommended for both breast mass and axillary lymph node. \par A 12:00 retroareolar 0.4 cm intraductal mass, for which ultrasound guided core biopsy recommended.\par \par 2019:\par Patient developed rash on upper extremities after D1, cycle #6 Carboplatin; chemotherapy discontinued.\par \par 2019:\par US –guided left breast biopsy – pathology breast consistent with intraductal papilloma left breast with ductal hyperplasia, adenosis, apocrine metaplasia. \par Left axillary LN biopsy – metastatic adenocarcinoma with papillary features, compatible with metastatic disease from known endometrial primary.\par \par 19- started Pembrolizumab.\par \par 10/1/19- admitted at [facility] with KATHRYN and DKA; diagnosed with type I diabetes, and started insulin sliding scale.\par \par 10/1/19: CT C/A/P-overall improvement in disease burden in the uterus and lymph nodes compared to 18. \par Persistent 1.2 cm nodular opacity along the right major fissure. No acute chest pathology, no PE.\par \par 2020 CT C/A/P-progression of endometrioid carcinoma of the uterus with increasing endometrial thickness and increasing soft tissue.  There now appears to be invasion of the myometrium.  Increasing pelvic lymphadenopathy.  Stable nodular opacity along the right major fissure.\par \par 2020: CT A/P–increasing right-sided pelvic adenopathy; increasing fluid distention of the endometrial cavity.  Stable opacity right lung, middle lobe\par \par –2020–received 3750 cGy to pelvis (Dr. Tapia).\par \par 2020- starts weekly  Abraxane in combination with pembrolizumab next on pembrolizumab maintenance.  \par \par CT CAP 22 : POD bulky L axillary and retropectoral adenopathy, new retroperitoneal and pelvic adenopathy   Ca 125 rising\par \par Did not want chemo. Agreed to pembro/ bevacizumab- started in Oct 2022.  \par \par feels well. Monitored clinically and . Did not want to go for follow up scans\par  [de-identified] : Ca 125 was elevated at initial diagnosis [de-identified] : poorly differentiated, endometrioid [de-identified] : TMB  high\par FoundationOne done Jan 2019- multiple mutations : PALB2 PIK3CA  CHUY, and others  \par ER moderate  ( path April 2019 )  [de-identified] : Feels well. nervous about medical appointments Lost few lbs- has good appetite but diet limited  due to diabetes. Will ask her PCP to refer to diabetic nutritionist

## 2023-07-11 NOTE — REVIEW OF SYSTEMS
[Patient Intake Form Reviewed] : Patient intake form was reviewed [Recent Change In Weight] : ~T recent weight change [Dysmenorrhea/Abn Vaginal Bleeding] : dysmenorrhea/abnormal vaginal bleeding [Joint Pain] : joint pain [Insomnia] : insomnia [Anxiety] : anxiety [Negative] : Allergic/Immunologic [Chest Pain] : no chest pain [Lower Ext Edema] : no lower extremity edema [Shortness Of Breath] : no shortness of breath [Cough] : no cough [Abdominal Pain] : no abdominal pain [Vomiting] : no vomiting [Skin Rash] : no skin rash [Easy Bleeding] : no tendency for easy bleeding [Easy Bruising] : no tendency for easy bruising [FreeTextEntry2] : per HPI  [FreeTextEntry9] : I

## 2023-07-11 NOTE — PHYSICAL EXAM
[Fully active, able to carry on all pre-disease performance without restriction] : Status 0 - Fully active, able to carry on all pre-disease performance without restriction [Normal] : RRR, normal S1S2, no murmurs, rubs, gallops [de-identified] : looks well  [de-identified] : minimal psoriatic rash on forearm  [de-identified] : anxious

## 2023-07-12 DIAGNOSIS — Z51.12 ENCOUNTER FOR ANTINEOPLASTIC IMMUNOTHERAPY: ICD-10-CM

## 2023-07-12 DIAGNOSIS — Z51.11 ENCOUNTER FOR ANTINEOPLASTIC CHEMOTHERAPY: ICD-10-CM

## 2023-08-01 ENCOUNTER — RESULT REVIEW (OUTPATIENT)
Age: 68
End: 2023-08-01

## 2023-08-01 ENCOUNTER — APPOINTMENT (OUTPATIENT)
Dept: INFUSION THERAPY | Facility: CLINIC | Age: 68
End: 2023-08-01

## 2023-08-01 ENCOUNTER — RESULT CHARGE (OUTPATIENT)
Age: 68
End: 2023-08-01

## 2023-08-01 LAB
ALBUMIN SERPL ELPH-MCNC: 4.4 G/DL — SIGNIFICANT CHANGE UP (ref 3.3–5)
ALP SERPL-CCNC: 100 U/L — SIGNIFICANT CHANGE UP (ref 40–120)
ALT FLD-CCNC: 26 U/L — SIGNIFICANT CHANGE UP (ref 10–45)
ANION GAP SERPL CALC-SCNC: 14 MMOL/L — SIGNIFICANT CHANGE UP (ref 5–17)
AST SERPL-CCNC: 16 U/L — SIGNIFICANT CHANGE UP (ref 10–40)
BASOPHILS # BLD AUTO: 0.03 K/UL — SIGNIFICANT CHANGE UP (ref 0–0.2)
BASOPHILS NFR BLD AUTO: 0.4 % — SIGNIFICANT CHANGE UP (ref 0–2)
BILIRUB SERPL-MCNC: 0.8 MG/DL — SIGNIFICANT CHANGE UP (ref 0.2–1.2)
BUN SERPL-MCNC: 11 MG/DL — SIGNIFICANT CHANGE UP (ref 7–23)
CALCIUM SERPL-MCNC: 9.5 MG/DL — SIGNIFICANT CHANGE UP (ref 8.4–10.5)
CANCER AG125 SERPL-ACNC: 530 U/ML — HIGH
CHLORIDE SERPL-SCNC: 100 MMOL/L — SIGNIFICANT CHANGE UP (ref 96–108)
CO2 SERPL-SCNC: 24 MMOL/L — SIGNIFICANT CHANGE UP (ref 22–31)
CREAT SERPL-MCNC: 0.48 MG/DL — LOW (ref 0.5–1.3)
EGFR: 104 ML/MIN/1.73M2 — SIGNIFICANT CHANGE UP
EOSINOPHIL # BLD AUTO: 0.06 K/UL — SIGNIFICANT CHANGE UP (ref 0–0.5)
EOSINOPHIL NFR BLD AUTO: 0.8 % — SIGNIFICANT CHANGE UP (ref 0–6)
GLUCOSE SERPL-MCNC: 202 MG/DL — HIGH (ref 70–99)
HCT VFR BLD CALC: 39.1 % — SIGNIFICANT CHANGE UP (ref 34.5–45)
HGB BLD-MCNC: 13.6 G/DL — SIGNIFICANT CHANGE UP (ref 11.5–15.5)
IMM GRANULOCYTES NFR BLD AUTO: 0.1 % — SIGNIFICANT CHANGE UP (ref 0–0.9)
LYMPHOCYTES # BLD AUTO: 0.81 K/UL — LOW (ref 1–3.3)
LYMPHOCYTES # BLD AUTO: 11.1 % — LOW (ref 13–44)
MCHC RBC-ENTMCNC: 31.9 PG — SIGNIFICANT CHANGE UP (ref 27–34)
MCHC RBC-ENTMCNC: 34.8 GM/DL — SIGNIFICANT CHANGE UP (ref 32–36)
MCV RBC AUTO: 91.6 FL — SIGNIFICANT CHANGE UP (ref 80–100)
MONOCYTES # BLD AUTO: 0.57 K/UL — SIGNIFICANT CHANGE UP (ref 0–0.9)
MONOCYTES NFR BLD AUTO: 7.8 % — SIGNIFICANT CHANGE UP (ref 2–14)
NEUTROPHILS # BLD AUTO: 5.79 K/UL — SIGNIFICANT CHANGE UP (ref 1.8–7.4)
NEUTROPHILS NFR BLD AUTO: 79.8 % — HIGH (ref 43–77)
NRBC # BLD: 0 /100 WBCS — SIGNIFICANT CHANGE UP (ref 0–0)
PLATELET # BLD AUTO: 289 K/UL — SIGNIFICANT CHANGE UP (ref 150–400)
POTASSIUM SERPL-MCNC: 4.1 MMOL/L — SIGNIFICANT CHANGE UP (ref 3.5–5.3)
POTASSIUM SERPL-SCNC: 4.1 MMOL/L — SIGNIFICANT CHANGE UP (ref 3.5–5.3)
PROT SERPL-MCNC: 6.8 G/DL — SIGNIFICANT CHANGE UP (ref 6–8.3)
RBC # BLD: 4.27 M/UL — SIGNIFICANT CHANGE UP (ref 3.8–5.2)
RBC # FLD: 13.1 % — SIGNIFICANT CHANGE UP (ref 10.3–14.5)
SODIUM SERPL-SCNC: 138 MMOL/L — SIGNIFICANT CHANGE UP (ref 135–145)
T4 FREE+ TSH PNL SERPL: 1.1 UIU/ML — SIGNIFICANT CHANGE UP (ref 0.27–4.2)
WBC # BLD: 7.27 K/UL — SIGNIFICANT CHANGE UP (ref 3.8–10.5)
WBC # FLD AUTO: 7.27 K/UL — SIGNIFICANT CHANGE UP (ref 3.8–10.5)

## 2023-08-09 ENCOUNTER — NON-APPOINTMENT (OUTPATIENT)
Age: 68
End: 2023-08-09

## 2023-08-10 RX ORDER — ALPRAZOLAM 0.25 MG/1
0.25 TABLET ORAL 3 TIMES DAILY
Qty: 30 | Refills: 0 | Status: ACTIVE | COMMUNITY
Start: 2018-11-20 | End: 1900-01-01

## 2023-08-22 ENCOUNTER — APPOINTMENT (OUTPATIENT)
Dept: INFUSION THERAPY | Facility: CLINIC | Age: 68
End: 2023-08-22

## 2023-09-11 ENCOUNTER — OUTPATIENT (OUTPATIENT)
Dept: OUTPATIENT SERVICES | Facility: HOSPITAL | Age: 68
LOS: 1 days | Discharge: HOME | End: 2023-09-11

## 2023-09-11 DIAGNOSIS — C54.1 MALIGNANT NEOPLASM OF ENDOMETRIUM: ICD-10-CM

## 2023-09-12 ENCOUNTER — RESULT REVIEW (OUTPATIENT)
Age: 68
End: 2023-09-12

## 2023-09-12 ENCOUNTER — APPOINTMENT (OUTPATIENT)
Dept: INFUSION THERAPY | Facility: CLINIC | Age: 68
End: 2023-09-12

## 2023-09-12 ENCOUNTER — RESULT CHARGE (OUTPATIENT)
Age: 68
End: 2023-09-12

## 2023-09-12 LAB
ALBUMIN SERPL ELPH-MCNC: 4.1 G/DL — SIGNIFICANT CHANGE UP (ref 3.3–5)
ALP SERPL-CCNC: 123 U/L — HIGH (ref 40–120)
ALT FLD-CCNC: 24 U/L — SIGNIFICANT CHANGE UP (ref 10–45)
ANION GAP SERPL CALC-SCNC: 12 MMOL/L — SIGNIFICANT CHANGE UP (ref 5–17)
AST SERPL-CCNC: 24 U/L — SIGNIFICANT CHANGE UP (ref 10–40)
BASOPHILS # BLD AUTO: 0.02 K/UL — SIGNIFICANT CHANGE UP (ref 0–0.2)
BASOPHILS NFR BLD AUTO: 0.3 % — SIGNIFICANT CHANGE UP (ref 0–2)
BILIRUB SERPL-MCNC: 0.5 MG/DL — SIGNIFICANT CHANGE UP (ref 0.2–1.2)
BUN SERPL-MCNC: 11 MG/DL — SIGNIFICANT CHANGE UP (ref 7–23)
CALCIUM SERPL-MCNC: 9.2 MG/DL — SIGNIFICANT CHANGE UP (ref 8.4–10.5)
CANCER AG125 SERPL-ACNC: 506 U/ML — HIGH
CHLORIDE SERPL-SCNC: 100 MMOL/L — SIGNIFICANT CHANGE UP (ref 96–108)
CO2 SERPL-SCNC: 24 MMOL/L — SIGNIFICANT CHANGE UP (ref 22–31)
CREAT SERPL-MCNC: 0.44 MG/DL — LOW (ref 0.5–1.3)
EGFR: 106 ML/MIN/1.73M2 — SIGNIFICANT CHANGE UP
EOSINOPHIL # BLD AUTO: 0.05 K/UL — SIGNIFICANT CHANGE UP (ref 0–0.5)
EOSINOPHIL NFR BLD AUTO: 0.7 % — SIGNIFICANT CHANGE UP (ref 0–6)
GLUCOSE SERPL-MCNC: 253 MG/DL — HIGH (ref 70–99)
HCT VFR BLD CALC: 40.1 % — SIGNIFICANT CHANGE UP (ref 34.5–45)
HGB BLD-MCNC: 13.7 G/DL — SIGNIFICANT CHANGE UP (ref 11.5–15.5)
IMM GRANULOCYTES NFR BLD AUTO: 0.4 % — SIGNIFICANT CHANGE UP (ref 0–0.9)
LYMPHOCYTES # BLD AUTO: 0.64 K/UL — LOW (ref 1–3.3)
LYMPHOCYTES # BLD AUTO: 9.6 % — LOW (ref 13–44)
MCHC RBC-ENTMCNC: 32 PG — SIGNIFICANT CHANGE UP (ref 27–34)
MCHC RBC-ENTMCNC: 34.2 GM/DL — SIGNIFICANT CHANGE UP (ref 32–36)
MCV RBC AUTO: 93.7 FL — SIGNIFICANT CHANGE UP (ref 80–100)
MONOCYTES # BLD AUTO: 0.51 K/UL — SIGNIFICANT CHANGE UP (ref 0–0.9)
MONOCYTES NFR BLD AUTO: 7.6 % — SIGNIFICANT CHANGE UP (ref 2–14)
NEUTROPHILS # BLD AUTO: 5.45 K/UL — SIGNIFICANT CHANGE UP (ref 1.8–7.4)
NEUTROPHILS NFR BLD AUTO: 81.4 % — HIGH (ref 43–77)
NRBC # BLD: 0 /100 WBCS — SIGNIFICANT CHANGE UP (ref 0–0)
PLATELET # BLD AUTO: 330 K/UL — SIGNIFICANT CHANGE UP (ref 150–400)
POTASSIUM SERPL-MCNC: 4.2 MMOL/L — SIGNIFICANT CHANGE UP (ref 3.5–5.3)
POTASSIUM SERPL-SCNC: 4.2 MMOL/L — SIGNIFICANT CHANGE UP (ref 3.5–5.3)
PROT SERPL-MCNC: 6.9 G/DL — SIGNIFICANT CHANGE UP (ref 6–8.3)
RBC # BLD: 4.28 M/UL — SIGNIFICANT CHANGE UP (ref 3.8–5.2)
RBC # FLD: 13 % — SIGNIFICANT CHANGE UP (ref 10.3–14.5)
SODIUM SERPL-SCNC: 135 MMOL/L — SIGNIFICANT CHANGE UP (ref 135–145)
WBC # BLD: 6.7 K/UL — SIGNIFICANT CHANGE UP (ref 3.8–10.5)
WBC # FLD AUTO: 6.7 K/UL — SIGNIFICANT CHANGE UP (ref 3.8–10.5)

## 2023-09-13 DIAGNOSIS — Z51.11 ENCOUNTER FOR ANTINEOPLASTIC CHEMOTHERAPY: ICD-10-CM

## 2023-09-13 LAB
BILIRUB UR QL STRIP: NEGATIVE
GLUCOSE UR-MCNC: ABNORMAL
HCG UR QL: 2 EU/DL
HGB UR QL STRIP.AUTO: NEGATIVE
KETONES UR-MCNC: NEGATIVE
LEUKOCYTE ESTERASE UR QL STRIP: NEGATIVE
NITRITE UR QL STRIP: NEGATIVE
PH UR STRIP: 6
PROT UR STRIP-MCNC: NEGATIVE
SP GR UR STRIP: 1.02

## 2023-10-01 PROBLEM — Z92.3 HISTORY OF RADIATION THERAPY: Status: RESOLVED | Noted: 2020-06-02 | Resolved: 2023-10-01

## 2023-10-03 ENCOUNTER — APPOINTMENT (OUTPATIENT)
Dept: INFUSION THERAPY | Facility: CLINIC | Age: 68
End: 2023-10-03

## 2023-10-24 ENCOUNTER — APPOINTMENT (OUTPATIENT)
Dept: HEMATOLOGY ONCOLOGY | Facility: CLINIC | Age: 68
End: 2023-10-24
Payer: MEDICARE

## 2023-10-24 ENCOUNTER — RESULT REVIEW (OUTPATIENT)
Age: 68
End: 2023-10-24

## 2023-10-24 ENCOUNTER — RESULT CHARGE (OUTPATIENT)
Age: 68
End: 2023-10-24

## 2023-10-24 ENCOUNTER — APPOINTMENT (OUTPATIENT)
Dept: INFUSION THERAPY | Facility: CLINIC | Age: 68
End: 2023-10-24
Payer: MEDICARE

## 2023-10-24 DIAGNOSIS — L40.50 ARTHROPATHIC PSORIASIS, UNSPECIFIED: ICD-10-CM

## 2023-10-24 DIAGNOSIS — Z23 ENCOUNTER FOR IMMUNIZATION: ICD-10-CM

## 2023-10-24 DIAGNOSIS — R59.0 LOCALIZED ENLARGED LYMPH NODES: ICD-10-CM

## 2023-10-24 DIAGNOSIS — R97.1 ELEVATED CANCER ANTIGEN 125 [CA 125]: ICD-10-CM

## 2023-10-24 DIAGNOSIS — L40.9 PSORIASIS, UNSPECIFIED: ICD-10-CM

## 2023-10-24 LAB
ALBUMIN SERPL ELPH-MCNC: 4.2 G/DL — SIGNIFICANT CHANGE UP (ref 3.3–5)
ALBUMIN SERPL ELPH-MCNC: 4.2 G/DL — SIGNIFICANT CHANGE UP (ref 3.3–5)
ALP SERPL-CCNC: 133 U/L — HIGH (ref 40–120)
ALP SERPL-CCNC: 133 U/L — HIGH (ref 40–120)
ALT FLD-CCNC: 16 U/L — SIGNIFICANT CHANGE UP (ref 10–45)
ALT FLD-CCNC: 16 U/L — SIGNIFICANT CHANGE UP (ref 10–45)
ANION GAP SERPL CALC-SCNC: 13 MMOL/L — SIGNIFICANT CHANGE UP (ref 5–17)
ANION GAP SERPL CALC-SCNC: 13 MMOL/L — SIGNIFICANT CHANGE UP (ref 5–17)
AST SERPL-CCNC: 17 U/L — SIGNIFICANT CHANGE UP (ref 10–40)
AST SERPL-CCNC: 17 U/L — SIGNIFICANT CHANGE UP (ref 10–40)
BASOPHILS # BLD AUTO: 0.03 K/UL — SIGNIFICANT CHANGE UP (ref 0–0.2)
BASOPHILS # BLD AUTO: 0.03 K/UL — SIGNIFICANT CHANGE UP (ref 0–0.2)
BASOPHILS NFR BLD AUTO: 0.3 % — SIGNIFICANT CHANGE UP (ref 0–2)
BASOPHILS NFR BLD AUTO: 0.3 % — SIGNIFICANT CHANGE UP (ref 0–2)
BILIRUB SERPL-MCNC: 0.7 MG/DL — SIGNIFICANT CHANGE UP (ref 0.2–1.2)
BILIRUB SERPL-MCNC: 0.7 MG/DL — SIGNIFICANT CHANGE UP (ref 0.2–1.2)
BUN SERPL-MCNC: 10 MG/DL — SIGNIFICANT CHANGE UP (ref 7–23)
BUN SERPL-MCNC: 10 MG/DL — SIGNIFICANT CHANGE UP (ref 7–23)
CALCIUM SERPL-MCNC: 9.8 MG/DL — SIGNIFICANT CHANGE UP (ref 8.4–10.5)
CALCIUM SERPL-MCNC: 9.8 MG/DL — SIGNIFICANT CHANGE UP (ref 8.4–10.5)
CANCER AG125 SERPL-ACNC: 541 U/ML — HIGH
CANCER AG125 SERPL-ACNC: 541 U/ML — HIGH
CHLORIDE SERPL-SCNC: 97 MMOL/L — SIGNIFICANT CHANGE UP (ref 96–108)
CHLORIDE SERPL-SCNC: 97 MMOL/L — SIGNIFICANT CHANGE UP (ref 96–108)
CO2 SERPL-SCNC: 24 MMOL/L — SIGNIFICANT CHANGE UP (ref 22–31)
CO2 SERPL-SCNC: 24 MMOL/L — SIGNIFICANT CHANGE UP (ref 22–31)
CREAT SERPL-MCNC: 0.45 MG/DL — LOW (ref 0.5–1.3)
CREAT SERPL-MCNC: 0.45 MG/DL — LOW (ref 0.5–1.3)
EGFR: 105 ML/MIN/1.73M2 — SIGNIFICANT CHANGE UP
EGFR: 105 ML/MIN/1.73M2 — SIGNIFICANT CHANGE UP
EOSINOPHIL # BLD AUTO: 0.05 K/UL — SIGNIFICANT CHANGE UP (ref 0–0.5)
EOSINOPHIL # BLD AUTO: 0.05 K/UL — SIGNIFICANT CHANGE UP (ref 0–0.5)
EOSINOPHIL NFR BLD AUTO: 0.5 % — SIGNIFICANT CHANGE UP (ref 0–6)
EOSINOPHIL NFR BLD AUTO: 0.5 % — SIGNIFICANT CHANGE UP (ref 0–6)
GLUCOSE SERPL-MCNC: 366 MG/DL — HIGH (ref 70–99)
GLUCOSE SERPL-MCNC: 366 MG/DL — HIGH (ref 70–99)
HCT VFR BLD CALC: 39 % — SIGNIFICANT CHANGE UP (ref 34.5–45)
HCT VFR BLD CALC: 39 % — SIGNIFICANT CHANGE UP (ref 34.5–45)
HGB BLD-MCNC: 13.4 G/DL — SIGNIFICANT CHANGE UP (ref 11.5–15.5)
HGB BLD-MCNC: 13.4 G/DL — SIGNIFICANT CHANGE UP (ref 11.5–15.5)
IMM GRANULOCYTES NFR BLD AUTO: 0.1 % — SIGNIFICANT CHANGE UP (ref 0–0.9)
IMM GRANULOCYTES NFR BLD AUTO: 0.1 % — SIGNIFICANT CHANGE UP (ref 0–0.9)
LYMPHOCYTES # BLD AUTO: 0.68 K/UL — LOW (ref 1–3.3)
LYMPHOCYTES # BLD AUTO: 0.68 K/UL — LOW (ref 1–3.3)
LYMPHOCYTES # BLD AUTO: 7.4 % — LOW (ref 13–44)
LYMPHOCYTES # BLD AUTO: 7.4 % — LOW (ref 13–44)
MCHC RBC-ENTMCNC: 31.5 PG — SIGNIFICANT CHANGE UP (ref 27–34)
MCHC RBC-ENTMCNC: 31.5 PG — SIGNIFICANT CHANGE UP (ref 27–34)
MCHC RBC-ENTMCNC: 34.4 GM/DL — SIGNIFICANT CHANGE UP (ref 32–36)
MCHC RBC-ENTMCNC: 34.4 GM/DL — SIGNIFICANT CHANGE UP (ref 32–36)
MCV RBC AUTO: 91.8 FL — SIGNIFICANT CHANGE UP (ref 80–100)
MCV RBC AUTO: 91.8 FL — SIGNIFICANT CHANGE UP (ref 80–100)
MONOCYTES # BLD AUTO: 0.66 K/UL — SIGNIFICANT CHANGE UP (ref 0–0.9)
MONOCYTES # BLD AUTO: 0.66 K/UL — SIGNIFICANT CHANGE UP (ref 0–0.9)
MONOCYTES NFR BLD AUTO: 7.2 % — SIGNIFICANT CHANGE UP (ref 2–14)
MONOCYTES NFR BLD AUTO: 7.2 % — SIGNIFICANT CHANGE UP (ref 2–14)
NEUTROPHILS # BLD AUTO: 7.73 K/UL — HIGH (ref 1.8–7.4)
NEUTROPHILS # BLD AUTO: 7.73 K/UL — HIGH (ref 1.8–7.4)
NEUTROPHILS NFR BLD AUTO: 84.5 % — HIGH (ref 43–77)
NEUTROPHILS NFR BLD AUTO: 84.5 % — HIGH (ref 43–77)
NRBC # BLD: 0 /100 WBCS — SIGNIFICANT CHANGE UP (ref 0–0)
NRBC # BLD: 0 /100 WBCS — SIGNIFICANT CHANGE UP (ref 0–0)
PLATELET # BLD AUTO: 387 K/UL — SIGNIFICANT CHANGE UP (ref 150–400)
PLATELET # BLD AUTO: 387 K/UL — SIGNIFICANT CHANGE UP (ref 150–400)
POTASSIUM SERPL-MCNC: 4.3 MMOL/L — SIGNIFICANT CHANGE UP (ref 3.5–5.3)
POTASSIUM SERPL-MCNC: 4.3 MMOL/L — SIGNIFICANT CHANGE UP (ref 3.5–5.3)
POTASSIUM SERPL-SCNC: 4.3 MMOL/L — SIGNIFICANT CHANGE UP (ref 3.5–5.3)
POTASSIUM SERPL-SCNC: 4.3 MMOL/L — SIGNIFICANT CHANGE UP (ref 3.5–5.3)
PROT SERPL-MCNC: 7.1 G/DL — SIGNIFICANT CHANGE UP (ref 6–8.3)
PROT SERPL-MCNC: 7.1 G/DL — SIGNIFICANT CHANGE UP (ref 6–8.3)
RBC # BLD: 4.25 M/UL — SIGNIFICANT CHANGE UP (ref 3.8–5.2)
RBC # BLD: 4.25 M/UL — SIGNIFICANT CHANGE UP (ref 3.8–5.2)
RBC # FLD: 12.8 % — SIGNIFICANT CHANGE UP (ref 10.3–14.5)
RBC # FLD: 12.8 % — SIGNIFICANT CHANGE UP (ref 10.3–14.5)
SODIUM SERPL-SCNC: 135 MMOL/L — SIGNIFICANT CHANGE UP (ref 135–145)
SODIUM SERPL-SCNC: 135 MMOL/L — SIGNIFICANT CHANGE UP (ref 135–145)
WBC # BLD: 9.16 K/UL — SIGNIFICANT CHANGE UP (ref 3.8–10.5)
WBC # BLD: 9.16 K/UL — SIGNIFICANT CHANGE UP (ref 3.8–10.5)
WBC # FLD AUTO: 9.16 K/UL — SIGNIFICANT CHANGE UP (ref 3.8–10.5)
WBC # FLD AUTO: 9.16 K/UL — SIGNIFICANT CHANGE UP (ref 3.8–10.5)

## 2023-10-24 PROCEDURE — 99214 OFFICE O/P EST MOD 30 MIN: CPT

## 2023-10-26 DIAGNOSIS — Z51.12 ENCOUNTER FOR ANTINEOPLASTIC IMMUNOTHERAPY: ICD-10-CM

## 2023-10-26 DIAGNOSIS — R59.0 LOCALIZED ENLARGED LYMPH NODES: ICD-10-CM

## 2023-10-26 DIAGNOSIS — L40.50 ARTHROPATHIC PSORIASIS, UNSPECIFIED: ICD-10-CM

## 2023-10-26 DIAGNOSIS — R97.1 ELEVATED CANCER ANTIGEN 125 [CA 125]: ICD-10-CM

## 2023-10-26 DIAGNOSIS — Z23 ENCOUNTER FOR IMMUNIZATION: ICD-10-CM

## 2023-11-10 ENCOUNTER — OUTPATIENT (OUTPATIENT)
Dept: OUTPATIENT SERVICES | Facility: HOSPITAL | Age: 68
LOS: 1 days | Discharge: HOME | End: 2023-11-10

## 2023-11-10 DIAGNOSIS — C54.1 MALIGNANT NEOPLASM OF ENDOMETRIUM: ICD-10-CM

## 2023-11-14 ENCOUNTER — APPOINTMENT (OUTPATIENT)
Dept: INFUSION THERAPY | Facility: CLINIC | Age: 68
End: 2023-11-14

## 2023-11-15 DIAGNOSIS — Z23 ENCOUNTER FOR IMMUNIZATION: ICD-10-CM

## 2023-11-15 DIAGNOSIS — Z51.11 ENCOUNTER FOR ANTINEOPLASTIC CHEMOTHERAPY: ICD-10-CM

## 2023-12-05 ENCOUNTER — RESULT REVIEW (OUTPATIENT)
Age: 68
End: 2023-12-05

## 2023-12-05 ENCOUNTER — APPOINTMENT (OUTPATIENT)
Dept: INFUSION THERAPY | Facility: CLINIC | Age: 68
End: 2023-12-05

## 2023-12-05 ENCOUNTER — RESULT CHARGE (OUTPATIENT)
Age: 68
End: 2023-12-05

## 2023-12-05 LAB
ALBUMIN SERPL ELPH-MCNC: 4.5 G/DL — SIGNIFICANT CHANGE UP (ref 3.3–5)
ALBUMIN SERPL ELPH-MCNC: 4.5 G/DL — SIGNIFICANT CHANGE UP (ref 3.3–5)
ALP SERPL-CCNC: 131 U/L — HIGH (ref 40–120)
ALP SERPL-CCNC: 131 U/L — HIGH (ref 40–120)
ALT FLD-CCNC: 23 U/L — SIGNIFICANT CHANGE UP (ref 10–45)
ALT FLD-CCNC: 23 U/L — SIGNIFICANT CHANGE UP (ref 10–45)
ANION GAP SERPL CALC-SCNC: 14 MMOL/L — SIGNIFICANT CHANGE UP (ref 5–17)
ANION GAP SERPL CALC-SCNC: 14 MMOL/L — SIGNIFICANT CHANGE UP (ref 5–17)
AST SERPL-CCNC: 19 U/L — SIGNIFICANT CHANGE UP (ref 10–40)
AST SERPL-CCNC: 19 U/L — SIGNIFICANT CHANGE UP (ref 10–40)
BASOPHILS # BLD AUTO: 0.03 K/UL — SIGNIFICANT CHANGE UP (ref 0–0.2)
BASOPHILS # BLD AUTO: 0.03 K/UL — SIGNIFICANT CHANGE UP (ref 0–0.2)
BASOPHILS NFR BLD AUTO: 0.4 % — SIGNIFICANT CHANGE UP (ref 0–2)
BASOPHILS NFR BLD AUTO: 0.4 % — SIGNIFICANT CHANGE UP (ref 0–2)
BILIRUB SERPL-MCNC: 0.7 MG/DL — SIGNIFICANT CHANGE UP (ref 0.2–1.2)
BILIRUB SERPL-MCNC: 0.7 MG/DL — SIGNIFICANT CHANGE UP (ref 0.2–1.2)
BUN SERPL-MCNC: 14 MG/DL — SIGNIFICANT CHANGE UP (ref 7–23)
BUN SERPL-MCNC: 14 MG/DL — SIGNIFICANT CHANGE UP (ref 7–23)
CALCIUM SERPL-MCNC: 10.3 MG/DL — SIGNIFICANT CHANGE UP (ref 8.4–10.5)
CALCIUM SERPL-MCNC: 10.3 MG/DL — SIGNIFICANT CHANGE UP (ref 8.4–10.5)
CANCER AG125 SERPL-ACNC: 503 U/ML — HIGH
CANCER AG125 SERPL-ACNC: 503 U/ML — HIGH
CHLORIDE SERPL-SCNC: 98 MMOL/L — SIGNIFICANT CHANGE UP (ref 96–108)
CHLORIDE SERPL-SCNC: 98 MMOL/L — SIGNIFICANT CHANGE UP (ref 96–108)
CO2 SERPL-SCNC: 23 MMOL/L — SIGNIFICANT CHANGE UP (ref 22–31)
CO2 SERPL-SCNC: 23 MMOL/L — SIGNIFICANT CHANGE UP (ref 22–31)
CREAT SERPL-MCNC: 0.47 MG/DL — LOW (ref 0.5–1.3)
CREAT SERPL-MCNC: 0.47 MG/DL — LOW (ref 0.5–1.3)
EGFR: 104 ML/MIN/1.73M2 — SIGNIFICANT CHANGE UP
EGFR: 104 ML/MIN/1.73M2 — SIGNIFICANT CHANGE UP
EOSINOPHIL # BLD AUTO: 0.09 K/UL — SIGNIFICANT CHANGE UP (ref 0–0.5)
EOSINOPHIL # BLD AUTO: 0.09 K/UL — SIGNIFICANT CHANGE UP (ref 0–0.5)
EOSINOPHIL NFR BLD AUTO: 1.2 % — SIGNIFICANT CHANGE UP (ref 0–6)
EOSINOPHIL NFR BLD AUTO: 1.2 % — SIGNIFICANT CHANGE UP (ref 0–6)
GLUCOSE SERPL-MCNC: 139 MG/DL — HIGH (ref 70–99)
GLUCOSE SERPL-MCNC: 139 MG/DL — HIGH (ref 70–99)
HCT VFR BLD CALC: 41.3 % — SIGNIFICANT CHANGE UP (ref 34.5–45)
HCT VFR BLD CALC: 41.3 % — SIGNIFICANT CHANGE UP (ref 34.5–45)
HGB BLD-MCNC: 14.3 G/DL — SIGNIFICANT CHANGE UP (ref 11.5–15.5)
HGB BLD-MCNC: 14.3 G/DL — SIGNIFICANT CHANGE UP (ref 11.5–15.5)
IMM GRANULOCYTES NFR BLD AUTO: 0.4 % — SIGNIFICANT CHANGE UP (ref 0–0.9)
IMM GRANULOCYTES NFR BLD AUTO: 0.4 % — SIGNIFICANT CHANGE UP (ref 0–0.9)
LYMPHOCYTES # BLD AUTO: 0.79 K/UL — LOW (ref 1–3.3)
LYMPHOCYTES # BLD AUTO: 0.79 K/UL — LOW (ref 1–3.3)
LYMPHOCYTES # BLD AUTO: 10.6 % — LOW (ref 13–44)
LYMPHOCYTES # BLD AUTO: 10.6 % — LOW (ref 13–44)
MCHC RBC-ENTMCNC: 31.5 PG — SIGNIFICANT CHANGE UP (ref 27–34)
MCHC RBC-ENTMCNC: 31.5 PG — SIGNIFICANT CHANGE UP (ref 27–34)
MCHC RBC-ENTMCNC: 34.6 GM/DL — SIGNIFICANT CHANGE UP (ref 32–36)
MCHC RBC-ENTMCNC: 34.6 GM/DL — SIGNIFICANT CHANGE UP (ref 32–36)
MCV RBC AUTO: 91 FL — SIGNIFICANT CHANGE UP (ref 80–100)
MCV RBC AUTO: 91 FL — SIGNIFICANT CHANGE UP (ref 80–100)
MONOCYTES # BLD AUTO: 0.61 K/UL — SIGNIFICANT CHANGE UP (ref 0–0.9)
MONOCYTES # BLD AUTO: 0.61 K/UL — SIGNIFICANT CHANGE UP (ref 0–0.9)
MONOCYTES NFR BLD AUTO: 8.2 % — SIGNIFICANT CHANGE UP (ref 2–14)
MONOCYTES NFR BLD AUTO: 8.2 % — SIGNIFICANT CHANGE UP (ref 2–14)
NEUTROPHILS # BLD AUTO: 5.91 K/UL — SIGNIFICANT CHANGE UP (ref 1.8–7.4)
NEUTROPHILS # BLD AUTO: 5.91 K/UL — SIGNIFICANT CHANGE UP (ref 1.8–7.4)
NEUTROPHILS NFR BLD AUTO: 79.2 % — HIGH (ref 43–77)
NEUTROPHILS NFR BLD AUTO: 79.2 % — HIGH (ref 43–77)
NRBC # BLD: 0 /100 WBCS — SIGNIFICANT CHANGE UP (ref 0–0)
NRBC # BLD: 0 /100 WBCS — SIGNIFICANT CHANGE UP (ref 0–0)
PLATELET # BLD AUTO: 374 K/UL — SIGNIFICANT CHANGE UP (ref 150–400)
PLATELET # BLD AUTO: 374 K/UL — SIGNIFICANT CHANGE UP (ref 150–400)
POTASSIUM SERPL-MCNC: 4.4 MMOL/L — SIGNIFICANT CHANGE UP (ref 3.5–5.3)
POTASSIUM SERPL-MCNC: 4.4 MMOL/L — SIGNIFICANT CHANGE UP (ref 3.5–5.3)
POTASSIUM SERPL-SCNC: 4.4 MMOL/L — SIGNIFICANT CHANGE UP (ref 3.5–5.3)
POTASSIUM SERPL-SCNC: 4.4 MMOL/L — SIGNIFICANT CHANGE UP (ref 3.5–5.3)
PROT SERPL-MCNC: 7.5 G/DL — SIGNIFICANT CHANGE UP (ref 6–8.3)
PROT SERPL-MCNC: 7.5 G/DL — SIGNIFICANT CHANGE UP (ref 6–8.3)
RBC # BLD: 4.54 M/UL — SIGNIFICANT CHANGE UP (ref 3.8–5.2)
RBC # BLD: 4.54 M/UL — SIGNIFICANT CHANGE UP (ref 3.8–5.2)
RBC # FLD: 13.1 % — SIGNIFICANT CHANGE UP (ref 10.3–14.5)
RBC # FLD: 13.1 % — SIGNIFICANT CHANGE UP (ref 10.3–14.5)
SODIUM SERPL-SCNC: 135 MMOL/L — SIGNIFICANT CHANGE UP (ref 135–145)
SODIUM SERPL-SCNC: 135 MMOL/L — SIGNIFICANT CHANGE UP (ref 135–145)
T4 FREE+ TSH PNL SERPL: 1.4 UIU/ML — SIGNIFICANT CHANGE UP (ref 0.27–4.2)
T4 FREE+ TSH PNL SERPL: 1.4 UIU/ML — SIGNIFICANT CHANGE UP (ref 0.27–4.2)
WBC # BLD: 7.46 K/UL — SIGNIFICANT CHANGE UP (ref 3.8–10.5)
WBC # BLD: 7.46 K/UL — SIGNIFICANT CHANGE UP (ref 3.8–10.5)
WBC # FLD AUTO: 7.46 K/UL — SIGNIFICANT CHANGE UP (ref 3.8–10.5)
WBC # FLD AUTO: 7.46 K/UL — SIGNIFICANT CHANGE UP (ref 3.8–10.5)

## 2023-12-27 ENCOUNTER — APPOINTMENT (OUTPATIENT)
Dept: INFUSION THERAPY | Facility: CLINIC | Age: 68
End: 2023-12-27

## 2023-12-27 VITALS
SYSTOLIC BLOOD PRESSURE: 156 MMHG | RESPIRATION RATE: 17 BRPM | WEIGHT: 107.38 LBS | BODY MASS INDEX: 21.65 KG/M2 | DIASTOLIC BLOOD PRESSURE: 78 MMHG | TEMPERATURE: 98.5 F | HEART RATE: 98 BPM | HEIGHT: 59 IN | OXYGEN SATURATION: 100 %

## 2024-01-09 ENCOUNTER — OUTPATIENT (OUTPATIENT)
Dept: OUTPATIENT SERVICES | Facility: HOSPITAL | Age: 69
LOS: 1 days | Discharge: HOME | End: 2024-01-09

## 2024-01-09 DIAGNOSIS — C54.1 MALIGNANT NEOPLASM OF ENDOMETRIUM: ICD-10-CM

## 2024-01-16 ENCOUNTER — RESULT REVIEW (OUTPATIENT)
Age: 69
End: 2024-01-16

## 2024-01-16 ENCOUNTER — APPOINTMENT (OUTPATIENT)
Dept: INFUSION THERAPY | Facility: CLINIC | Age: 69
End: 2024-01-16
Payer: MEDICARE

## 2024-01-16 ENCOUNTER — APPOINTMENT (OUTPATIENT)
Dept: HEMATOLOGY ONCOLOGY | Facility: CLINIC | Age: 69
End: 2024-01-16
Payer: MEDICARE

## 2024-01-16 ENCOUNTER — RESULT CHARGE (OUTPATIENT)
Age: 69
End: 2024-01-16

## 2024-01-16 VITALS
HEIGHT: 59 IN | OXYGEN SATURATION: 98 % | RESPIRATION RATE: 17 BRPM | WEIGHT: 109.38 LBS | DIASTOLIC BLOOD PRESSURE: 84 MMHG | HEART RATE: 86 BPM | BODY MASS INDEX: 22.05 KG/M2 | SYSTOLIC BLOOD PRESSURE: 157 MMHG | TEMPERATURE: 98.7 F

## 2024-01-16 DIAGNOSIS — Z51.12 ENCOUNTER FOR ANTINEOPLASTIC IMMUNOTHERAPY: ICD-10-CM

## 2024-01-16 LAB
ALBUMIN SERPL ELPH-MCNC: 4.3 G/DL — SIGNIFICANT CHANGE UP (ref 3.3–5)
ALP SERPL-CCNC: 127 U/L — HIGH (ref 40–120)
ALT FLD-CCNC: 24 U/L — SIGNIFICANT CHANGE UP (ref 10–45)
ANION GAP SERPL CALC-SCNC: 15 MMOL/L — SIGNIFICANT CHANGE UP (ref 5–17)
AST SERPL-CCNC: 25 U/L — SIGNIFICANT CHANGE UP (ref 10–40)
BASOPHILS # BLD AUTO: 0.02 K/UL — SIGNIFICANT CHANGE UP (ref 0–0.2)
BASOPHILS NFR BLD AUTO: 0.4 % — SIGNIFICANT CHANGE UP (ref 0–2)
BILIRUB SERPL-MCNC: 0.6 MG/DL — SIGNIFICANT CHANGE UP (ref 0.2–1.2)
BUN SERPL-MCNC: 12 MG/DL — SIGNIFICANT CHANGE UP (ref 7–23)
CALCIUM SERPL-MCNC: 9.4 MG/DL — SIGNIFICANT CHANGE UP (ref 8.4–10.5)
CANCER AG125 SERPL-ACNC: 532 U/ML — HIGH
CHLORIDE SERPL-SCNC: 101 MMOL/L — SIGNIFICANT CHANGE UP (ref 96–108)
CO2 SERPL-SCNC: 24 MMOL/L — SIGNIFICANT CHANGE UP (ref 22–31)
CREAT SERPL-MCNC: 0.46 MG/DL — LOW (ref 0.5–1.3)
EGFR: 104 ML/MIN/1.73M2 — SIGNIFICANT CHANGE UP
EOSINOPHIL # BLD AUTO: 0.02 K/UL — SIGNIFICANT CHANGE UP (ref 0–0.5)
EOSINOPHIL NFR BLD AUTO: 0.4 % — SIGNIFICANT CHANGE UP (ref 0–6)
GLUCOSE SERPL-MCNC: 173 MG/DL — HIGH (ref 70–99)
HCT VFR BLD CALC: 42.3 % — SIGNIFICANT CHANGE UP (ref 34.5–45)
HGB BLD-MCNC: 14.4 G/DL — SIGNIFICANT CHANGE UP (ref 11.5–15.5)
IMM GRANULOCYTES NFR BLD AUTO: 0.2 % — SIGNIFICANT CHANGE UP (ref 0–0.9)
LYMPHOCYTES # BLD AUTO: 0.5 K/UL — LOW (ref 1–3.3)
LYMPHOCYTES # BLD AUTO: 9.8 % — LOW (ref 13–44)
MCHC RBC-ENTMCNC: 30.8 PG — SIGNIFICANT CHANGE UP (ref 27–34)
MCHC RBC-ENTMCNC: 34 GM/DL — SIGNIFICANT CHANGE UP (ref 32–36)
MCV RBC AUTO: 90.6 FL — SIGNIFICANT CHANGE UP (ref 80–100)
MONOCYTES # BLD AUTO: 0.48 K/UL — SIGNIFICANT CHANGE UP (ref 0–0.9)
MONOCYTES NFR BLD AUTO: 9.4 % — SIGNIFICANT CHANGE UP (ref 2–14)
NEUTROPHILS # BLD AUTO: 4.09 K/UL — SIGNIFICANT CHANGE UP (ref 1.8–7.4)
NEUTROPHILS NFR BLD AUTO: 79.8 % — HIGH (ref 43–77)
NRBC # BLD: 0 /100 WBCS — SIGNIFICANT CHANGE UP (ref 0–0)
PLATELET # BLD AUTO: 329 K/UL — SIGNIFICANT CHANGE UP (ref 150–400)
POTASSIUM SERPL-MCNC: 4.4 MMOL/L — SIGNIFICANT CHANGE UP (ref 3.5–5.3)
POTASSIUM SERPL-SCNC: 4.4 MMOL/L — SIGNIFICANT CHANGE UP (ref 3.5–5.3)
PROT SERPL-MCNC: 7.1 G/DL — SIGNIFICANT CHANGE UP (ref 6–8.3)
RBC # BLD: 4.67 M/UL — SIGNIFICANT CHANGE UP (ref 3.8–5.2)
RBC # FLD: 13.2 % — SIGNIFICANT CHANGE UP (ref 10.3–14.5)
SODIUM SERPL-SCNC: 139 MMOL/L — SIGNIFICANT CHANGE UP (ref 135–145)
WBC # BLD: 5.12 K/UL — SIGNIFICANT CHANGE UP (ref 3.8–10.5)
WBC # FLD AUTO: 5.12 K/UL — SIGNIFICANT CHANGE UP (ref 3.8–10.5)

## 2024-01-16 PROCEDURE — 99214 OFFICE O/P EST MOD 30 MIN: CPT

## 2024-01-16 NOTE — ASSESSMENT
[FreeTextEntry1] : 69 y/o female diagnosed in 2018 poorly diff endometrioid uterine cancer ( extensive metastatic retroperitoneal VITO, later found to have metastatic disease in axillary node and pulm nodule ) [facility] dgn in 2018 ; s/p Taxol/ catbo; s/p Abraxane, on pembrolizumab since May 2019.  Clinically doing well. Asymptomatic but scans in FAll 2022 showed disease progression- enlarging axillary retropectoral and retroperit pelvic VITO.  Declined chemotherapy.  Continues pembro and soham added in October 2022  Asymptomatic Ca 125 stable.  Clinically doing well.  continue current tx.  She was reluctant to go for routine scans but will agree if new symptoms or significant change in tumor markers.  Exam in 4 months

## 2024-01-16 NOTE — HISTORY OF PRESENT ILLNESS
[Disease: _____________________] : Disease: [unfilled] [AJCC Stage: ____] : AJCC Stage: [unfilled] [de-identified] : 65 years old postmenopausal  female , with stage III C poorly differentiated, endometrioid carcinoma of the uterus diagnosed in 2018.  CASE SYNOPSIS:  2018: Cervical mass biopsy - poorly differentiated carcinoma, favoring endometrial origin.  2018: Pelvic US (performed at [facility]): uterus size 9.9 x 5.3x 4.4 cm, endometrial mucosa thickness 24 mm, cervix with lobular appearance and heterogenous echo texture. Adnexae unremarkable, but a heterogenous solid  mass, measuring 3.8 x 3.6 x 2.8 cm, is described adjacent to the right ovary.  2018: CT C/A/P: No evidence of distant metastases; significant distension endometrial cavity (16 mm in size), extending into enlarged bulky cervix, with questionable proximal vaginal extension. Significant, diffuse retroperitoneal and pelvic lymphadenopathy ( lymph nodes in right pelvis abutting the right ovary, as seen on pelvis ultrasound.  2018: Pathology review [facility]: poorly differentiated, endometrioid adenocarcinoma; IHC non- contributory; cannot distinguished cervical vs endometrial origin.  2018: Started  Taxol/Carbo.  2019:  Foundation One study consistent with MSI- H, TBM- high status, no other actionable target gene alterations.  2019: PET/CT - decrease in FDG avidity in both the endometrial cavity, and in the retroperitoneal and abdominopelvic lymphadenopathy. Unchanged size  of bilateral retrocrural LAD. Multiple FDG avid left axillary and retropectoral lymph nodes (? new)- for which FNA recommended. Unchanged, minimally changed FDG avid nodular opacity RML.  19: Mammography: Area of architectural distortion central left breast, correlating with US -described lobulated hypoechoic mass left breast 12:00, 5 cm from the nipple. Breast ultrasound: Left breast 12:00 5 cm from the nipple lobulated hypoechoic mass with associated architectural distortion. Multiple abnormal left axillary lymph nodes with focal cortical thickening. Ultrasound-guided core biopsy recommended for both breast mass and axillary lymph node.  A 12:00 retroareolar 0.4 cm intraductal mass, for which ultrasound guided core biopsy recommended.  2019: Patient developed rash on upper extremities after D1, cycle #6 Carboplatin; chemotherapy discontinued.  2019: US -guided left breast biopsy - pathology breast consistent with intraductal papilloma left breast with ductal hyperplasia, adenosis, apocrine metaplasia.  Left axillary LN biopsy - metastatic adenocarcinoma with papillary features, compatible with metastatic disease from known endometrial primary.  19- started Pembrolizumab.  10/1/19- admitted at [facility] with KATHRYN and DKA; diagnosed with type I diabetes, and started insulin sliding scale.  10/1/19: CT C/A/P-overall improvement in disease burden in the uterus and lymph nodes compared to 18.  Persistent 1.2 cm nodular opacity along the right major fissure. No acute chest pathology, no PE.  2020 CT C/A/P-progression of endometrioid carcinoma of the uterus with increasing endometrial thickness and increasing soft tissue.  There now appears to be invasion of the myometrium.  Increasing pelvic lymphadenopathy.  Stable nodular opacity along the right major fissure.  2020: CT A/P-increasing right-sided pelvic adenopathy; increasing fluid distention of the endometrial cavity.  Stable opacity right lung, middle lobe  -2020-received 3750 cGy to pelvis (Dr. Tapia).  2020- starts weekly  Abraxane in combination with pembrolizumab next on pembrolizumab maintenance.    CT CAP 22 : POD bulky L axillary and retropectoral adenopathy, new retroperitoneal and pelvic adenopathy   Ca 125 rising  Did not want chemo. Agreed to pembro/ bevacizumab- started in Oct 2022.    feels well. Monitored clinically and  ( elevated at baseline) . Did not want to go for follow up scans  [de-identified] : poorly differentiated, endometrioid [de-identified] : TMB  high\par  FoundationOne done Jan 2019- multiple mutations : PALB2 PIK3CA  CHUY, and others  \par  ER moderate  ( path April 2019 )  [de-identified] : Ca 125 was elevated at initial diagnosis [de-identified] : Feels well. Nervous about medical appointments. Chronic knee pains. Got steroids injections. Weight stable. Diet limited because of diabetes. Has good appetite.

## 2024-01-16 NOTE — REVIEW OF SYSTEMS
[Patient Intake Form Reviewed] : Patient intake form was reviewed [Recent Change In Weight] : ~T recent weight change [Dysmenorrhea/Abn Vaginal Bleeding] : dysmenorrhea/abnormal vaginal bleeding [Joint Pain] : joint pain [Insomnia] : insomnia [Anxiety] : anxiety [Negative] : Allergic/Immunologic [Chest Pain] : no chest pain [Lower Ext Edema] : no lower extremity edema [Shortness Of Breath] : no shortness of breath [Cough] : no cough [Abdominal Pain] : no abdominal pain [Vomiting] : no vomiting [Skin Rash] : no skin rash [Easy Bleeding] : no tendency for easy bleeding [Easy Bruising] : no tendency for easy bruising [FreeTextEntry9] : knee pains

## 2024-01-17 DIAGNOSIS — Z51.11 ENCOUNTER FOR ANTINEOPLASTIC CHEMOTHERAPY: ICD-10-CM

## 2024-02-06 ENCOUNTER — APPOINTMENT (OUTPATIENT)
Dept: INFUSION THERAPY | Facility: CLINIC | Age: 69
End: 2024-02-06

## 2024-02-06 VITALS
WEIGHT: 112.19 LBS | RESPIRATION RATE: 16 BRPM | BODY MASS INDEX: 22.62 KG/M2 | DIASTOLIC BLOOD PRESSURE: 78 MMHG | HEIGHT: 59 IN | OXYGEN SATURATION: 98 % | TEMPERATURE: 97.5 F | HEART RATE: 90 BPM | SYSTOLIC BLOOD PRESSURE: 156 MMHG

## 2024-02-27 ENCOUNTER — RESULT REVIEW (OUTPATIENT)
Age: 69
End: 2024-02-27

## 2024-02-27 ENCOUNTER — NON-APPOINTMENT (OUTPATIENT)
Age: 69
End: 2024-02-27

## 2024-02-27 ENCOUNTER — APPOINTMENT (OUTPATIENT)
Dept: INFUSION THERAPY | Facility: CLINIC | Age: 69
End: 2024-02-27

## 2024-02-27 ENCOUNTER — RESULT CHARGE (OUTPATIENT)
Age: 69
End: 2024-02-27

## 2024-02-27 LAB
ALBUMIN SERPL ELPH-MCNC: 4.5 G/DL — SIGNIFICANT CHANGE UP (ref 3.3–5)
ALP SERPL-CCNC: 133 U/L — HIGH (ref 40–120)
ALT FLD-CCNC: 15 U/L — SIGNIFICANT CHANGE UP (ref 10–45)
ANION GAP SERPL CALC-SCNC: 16 MMOL/L — SIGNIFICANT CHANGE UP (ref 5–17)
AST SERPL-CCNC: 11 U/L — SIGNIFICANT CHANGE UP (ref 10–40)
BASOPHILS # BLD AUTO: 0.03 K/UL — SIGNIFICANT CHANGE UP (ref 0–0.2)
BASOPHILS NFR BLD AUTO: 0.3 % — SIGNIFICANT CHANGE UP (ref 0–2)
BILIRUB SERPL-MCNC: 0.8 MG/DL — SIGNIFICANT CHANGE UP (ref 0.2–1.2)
BUN SERPL-MCNC: 17 MG/DL — SIGNIFICANT CHANGE UP (ref 7–23)
CALCIUM SERPL-MCNC: 10.1 MG/DL — SIGNIFICANT CHANGE UP (ref 8.4–10.5)
CANCER AG125 SERPL-ACNC: 309 U/ML — HIGH
CHLORIDE SERPL-SCNC: 96 MMOL/L — SIGNIFICANT CHANGE UP (ref 96–108)
CO2 SERPL-SCNC: 22 MMOL/L — SIGNIFICANT CHANGE UP (ref 22–31)
CREAT SERPL-MCNC: 0.54 MG/DL — SIGNIFICANT CHANGE UP (ref 0.5–1.3)
EGFR: 100 ML/MIN/1.73M2 — SIGNIFICANT CHANGE UP
EOSINOPHIL # BLD AUTO: 0.05 K/UL — SIGNIFICANT CHANGE UP (ref 0–0.5)
EOSINOPHIL NFR BLD AUTO: 0.5 % — SIGNIFICANT CHANGE UP (ref 0–6)
GLUCOSE SERPL-MCNC: 319 MG/DL — HIGH (ref 70–99)
HCT VFR BLD CALC: 45.6 % — HIGH (ref 34.5–45)
HGB BLD-MCNC: 15.5 G/DL — SIGNIFICANT CHANGE UP (ref 11.5–15.5)
IMM GRANULOCYTES NFR BLD AUTO: 0.3 % — SIGNIFICANT CHANGE UP (ref 0–0.9)
LYMPHOCYTES # BLD AUTO: 0.95 K/UL — LOW (ref 1–3.3)
LYMPHOCYTES # BLD AUTO: 9.4 % — LOW (ref 13–44)
MCHC RBC-ENTMCNC: 30.5 PG — SIGNIFICANT CHANGE UP (ref 27–34)
MCHC RBC-ENTMCNC: 34 GM/DL — SIGNIFICANT CHANGE UP (ref 32–36)
MCV RBC AUTO: 89.8 FL — SIGNIFICANT CHANGE UP (ref 80–100)
MONOCYTES # BLD AUTO: 0.96 K/UL — HIGH (ref 0–0.9)
MONOCYTES NFR BLD AUTO: 9.5 % — SIGNIFICANT CHANGE UP (ref 2–14)
NEUTROPHILS # BLD AUTO: 8.07 K/UL — HIGH (ref 1.8–7.4)
NEUTROPHILS NFR BLD AUTO: 80 % — HIGH (ref 43–77)
NRBC # BLD: 0 /100 WBCS — SIGNIFICANT CHANGE UP (ref 0–0)
PLATELET # BLD AUTO: 411 K/UL — HIGH (ref 150–400)
POTASSIUM SERPL-MCNC: 4.4 MMOL/L — SIGNIFICANT CHANGE UP (ref 3.5–5.3)
POTASSIUM SERPL-SCNC: 4.4 MMOL/L — SIGNIFICANT CHANGE UP (ref 3.5–5.3)
PROT SERPL-MCNC: 8 G/DL — SIGNIFICANT CHANGE UP (ref 6–8.3)
RBC # BLD: 5.08 M/UL — SIGNIFICANT CHANGE UP (ref 3.8–5.2)
RBC # FLD: 13.5 % — SIGNIFICANT CHANGE UP (ref 10.3–14.5)
SODIUM SERPL-SCNC: 133 MMOL/L — LOW (ref 135–145)
T4 FREE+ TSH PNL SERPL: 1.15 UIU/ML — SIGNIFICANT CHANGE UP (ref 0.27–4.2)
WBC # BLD: 10.09 K/UL — SIGNIFICANT CHANGE UP (ref 3.8–10.5)
WBC # FLD AUTO: 10.09 K/UL — SIGNIFICANT CHANGE UP (ref 3.8–10.5)

## 2024-03-18 ENCOUNTER — OUTPATIENT (OUTPATIENT)
Dept: OUTPATIENT SERVICES | Facility: HOSPITAL | Age: 69
LOS: 1 days | Discharge: HOME | End: 2024-03-18

## 2024-03-18 DIAGNOSIS — C54.1 MALIGNANT NEOPLASM OF ENDOMETRIUM: ICD-10-CM

## 2024-03-19 ENCOUNTER — APPOINTMENT (OUTPATIENT)
Dept: INFUSION THERAPY | Facility: CLINIC | Age: 69
End: 2024-03-19

## 2024-03-20 DIAGNOSIS — Z51.11 ENCOUNTER FOR ANTINEOPLASTIC CHEMOTHERAPY: ICD-10-CM

## 2024-04-09 ENCOUNTER — RESULT REVIEW (OUTPATIENT)
Age: 69
End: 2024-04-09

## 2024-04-09 ENCOUNTER — APPOINTMENT (OUTPATIENT)
Dept: INFUSION THERAPY | Facility: CLINIC | Age: 69
End: 2024-04-09
Payer: MEDICARE

## 2024-04-09 ENCOUNTER — APPOINTMENT (OUTPATIENT)
Dept: HEMATOLOGY ONCOLOGY | Facility: CLINIC | Age: 69
End: 2024-04-09
Payer: MEDICARE

## 2024-04-09 DIAGNOSIS — Z51.12 ENCOUNTER FOR ANTINEOPLASTIC IMMUNOTHERAPY: ICD-10-CM

## 2024-04-09 DIAGNOSIS — C54.1 MALIGNANT NEOPLASM OF ENDOMETRIUM: ICD-10-CM

## 2024-04-09 LAB
ALBUMIN SERPL ELPH-MCNC: 4.4 G/DL — SIGNIFICANT CHANGE UP (ref 3.3–5)
ALP SERPL-CCNC: 123 U/L — HIGH (ref 40–120)
ALT FLD-CCNC: 16 U/L — SIGNIFICANT CHANGE UP (ref 10–45)
ANION GAP SERPL CALC-SCNC: 12 MMOL/L — SIGNIFICANT CHANGE UP (ref 5–17)
AST SERPL-CCNC: 15 U/L — SIGNIFICANT CHANGE UP (ref 10–40)
BASOPHILS # BLD AUTO: 0.04 K/UL — SIGNIFICANT CHANGE UP (ref 0–0.2)
BASOPHILS NFR BLD AUTO: 0.5 % — SIGNIFICANT CHANGE UP (ref 0–2)
BILIRUB SERPL-MCNC: 0.6 MG/DL — SIGNIFICANT CHANGE UP (ref 0.2–1.2)
BUN SERPL-MCNC: 10 MG/DL — SIGNIFICANT CHANGE UP (ref 7–23)
CALCIUM SERPL-MCNC: 9.5 MG/DL — SIGNIFICANT CHANGE UP (ref 8.4–10.5)
CHLORIDE SERPL-SCNC: 102 MMOL/L — SIGNIFICANT CHANGE UP (ref 96–108)
CO2 SERPL-SCNC: 25 MMOL/L — SIGNIFICANT CHANGE UP (ref 22–31)
CREAT SERPL-MCNC: 0.52 MG/DL — SIGNIFICANT CHANGE UP (ref 0.5–1.3)
EGFR: 101 ML/MIN/1.73M2 — SIGNIFICANT CHANGE UP
EOSINOPHIL # BLD AUTO: 0.04 K/UL — SIGNIFICANT CHANGE UP (ref 0–0.5)
EOSINOPHIL NFR BLD AUTO: 0.5 % — SIGNIFICANT CHANGE UP (ref 0–6)
GLUCOSE SERPL-MCNC: 139 MG/DL — HIGH (ref 70–99)
HCT VFR BLD CALC: 41 % — SIGNIFICANT CHANGE UP (ref 34.5–45)
HGB BLD-MCNC: 14.1 G/DL — SIGNIFICANT CHANGE UP (ref 11.5–15.5)
IMM GRANULOCYTES NFR BLD AUTO: 0.4 % — SIGNIFICANT CHANGE UP (ref 0–0.9)
LYMPHOCYTES # BLD AUTO: 0.76 K/UL — LOW (ref 1–3.3)
LYMPHOCYTES # BLD AUTO: 10.2 % — LOW (ref 13–44)
MCHC RBC-ENTMCNC: 30.9 PG — SIGNIFICANT CHANGE UP (ref 27–34)
MCHC RBC-ENTMCNC: 34.4 GM/DL — SIGNIFICANT CHANGE UP (ref 32–36)
MCV RBC AUTO: 89.9 FL — SIGNIFICANT CHANGE UP (ref 80–100)
MONOCYTES # BLD AUTO: 0.61 K/UL — SIGNIFICANT CHANGE UP (ref 0–0.9)
MONOCYTES NFR BLD AUTO: 8.2 % — SIGNIFICANT CHANGE UP (ref 2–14)
NEUTROPHILS # BLD AUTO: 5.99 K/UL — SIGNIFICANT CHANGE UP (ref 1.8–7.4)
NEUTROPHILS NFR BLD AUTO: 80.2 % — HIGH (ref 43–77)
NRBC # BLD: 0 /100 WBCS — SIGNIFICANT CHANGE UP (ref 0–0)
PLATELET # BLD AUTO: 341 K/UL — SIGNIFICANT CHANGE UP (ref 150–400)
POTASSIUM SERPL-MCNC: 4.4 MMOL/L — SIGNIFICANT CHANGE UP (ref 3.5–5.3)
POTASSIUM SERPL-SCNC: 4.4 MMOL/L — SIGNIFICANT CHANGE UP (ref 3.5–5.3)
PROT SERPL-MCNC: 7 G/DL — SIGNIFICANT CHANGE UP (ref 6–8.3)
RBC # BLD: 4.56 M/UL — SIGNIFICANT CHANGE UP (ref 3.8–5.2)
RBC # FLD: 14.2 % — SIGNIFICANT CHANGE UP (ref 10.3–14.5)
SODIUM SERPL-SCNC: 139 MMOL/L — SIGNIFICANT CHANGE UP (ref 135–145)
T4 FREE+ TSH PNL SERPL: 1.28 UIU/ML — SIGNIFICANT CHANGE UP (ref 0.27–4.2)
WBC # BLD: 7.47 K/UL — SIGNIFICANT CHANGE UP (ref 3.8–10.5)
WBC # FLD AUTO: 7.47 K/UL — SIGNIFICANT CHANGE UP (ref 3.8–10.5)

## 2024-04-09 PROCEDURE — 99213 OFFICE O/P EST LOW 20 MIN: CPT

## 2024-04-09 NOTE — PHYSICAL EXAM
[Fully active, able to carry on all pre-disease performance without restriction] : Status 0 - Fully active, able to carry on all pre-disease performance without restriction [Normal] : normoactive bowel sounds, soft and nontender, no hepatosplenomegaly or masses appreciated [de-identified] : looks well

## 2024-04-09 NOTE — ASSESSMENT
[FreeTextEntry1] : 69 y/o female diagnosed in 2018 poorly diff endometrioid uterine cancer ( extensive metastatic retroperitoneal VITO, later found to have metastatic disease in axillary node and pulm nodule ) [facility] H dgn in 2018 ; s/p Taxol/ catbo; s/p Abraxane, on pembrolizumab since May 2019.  Clinically doing well. Asymptomatic but scans in FAll 2022 showed disease progression- enlarging axillary retropectoral and retroperit pelvic VITO.  Declined chemotherapy.  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   Asymptomatic Ca 125 stable/ even trending down.   Clinically doing well.  continue current tx.  She is very reluctant to go for routine scans but will agree if new symptoms or significant change in tumor markers.  Exam in 4 months.

## 2024-04-09 NOTE — REVIEW OF SYSTEMS
[Patient Intake Form Reviewed] : Patient intake form was reviewed [Recent Change In Weight] : ~T recent weight change [Dysmenorrhea/Abn Vaginal Bleeding] : dysmenorrhea/abnormal vaginal bleeding [Joint Pain] : joint pain [Negative] : Allergic/Immunologic [Cough] : no cough [Abdominal Pain] : no abdominal pain [Vomiting] : no vomiting [Skin Rash] : no skin rash [FreeTextEntry9] : knee pains

## 2024-04-09 NOTE — HISTORY OF PRESENT ILLNESS
[Disease: _____________________] : Disease: [unfilled] [AJCC Stage: ____] : AJCC Stage: [unfilled] [de-identified] : 65 years old postmenopausal  female , with stage III C poorly differentiated, endometrioid carcinoma of the uterus diagnosed in 2018.  CASE SYNOPSIS:  2018: Cervical mass biopsy - poorly differentiated carcinoma, favoring endometrial origin.  2018: Pelvic US (performed at [facility]): uterus size 9.9 x 5.3x 4.4 cm, endometrial mucosa thickness 24 mm, cervix with lobular appearance and heterogenous echo texture. Adnexae unremarkable, but a heterogenous solid  mass, measuring 3.8 x 3.6 x 2.8 cm, is described adjacent to the right ovary.  2018: CT C/A/P: No evidence of distant metastases; significant distension endometrial cavity (16 mm in size), extending into enlarged bulky cervix, with questionable proximal vaginal extension. Significant, diffuse retroperitoneal and pelvic lymphadenopathy ( lymph nodes in right pelvis abutting the right ovary, as seen on pelvis ultrasound.  2018: Pathology review [facility]: poorly differentiated, endometrioid adenocarcinoma; IHC non- contributory; cannot distinguished cervical vs endometrial origin.  2018: Started  Taxol/Carbo.  2019:  Foundation One study consistent with MSI- H, TBM- high status, no other actionable target gene alterations.  2019: PET/CT - decrease in FDG avidity in both the endometrial cavity, and in the retroperitoneal and abdominopelvic lymphadenopathy. Unchanged size  of bilateral retrocrural LAD. Multiple FDG avid left axillary and retropectoral lymph nodes (? new)- for which FNA recommended. Unchanged, minimally changed FDG avid nodular opacity RML.  19: Mammography: Area of architectural distortion central left breast, correlating with US -described lobulated hypoechoic mass left breast 12:00, 5 cm from the nipple. Breast ultrasound: Left breast 12:00 5 cm from the nipple lobulated hypoechoic mass with associated architectural distortion. Multiple abnormal left axillary lymph nodes with focal cortical thickening. Ultrasound-guided core biopsy recommended for both breast mass and axillary lymph node.  A 12:00 retroareolar 0.4 cm intraductal mass, for which ultrasound guided core biopsy recommended.  2019: Patient developed rash on upper extremities after D1, cycle #6 Carboplatin; chemotherapy discontinued.  2019: US -guided left breast biopsy - pathology breast consistent with intraductal papilloma left breast with ductal hyperplasia, adenosis, apocrine metaplasia.  Left axillary LN biopsy - metastatic adenocarcinoma with papillary features, compatible with metastatic disease from known endometrial primary.  19- started Pembrolizumab.  10/1/19- admitted at [facility] with KATHRYN and DKA; diagnosed with type I diabetes, and started insulin sliding scale.  10/1/19: CT C/A/P-overall improvement in disease burden in the uterus and lymph nodes compared to 18.  Persistent 1.2 cm nodular opacity along the right major fissure. No acute chest pathology, no PE.  2020 CT C/A/P-progression of endometrioid carcinoma of the uterus with increasing endometrial thickness and increasing soft tissue.  There now appears to be invasion of the myometrium.  Increasing pelvic lymphadenopathy.  Stable nodular opacity along the right major fissure.  2020: CT A/P-increasing right-sided pelvic adenopathy; increasing fluid distention of the endometrial cavity.  Stable opacity right lung, middle lobe  -2020-received 3750 cGy to pelvis (Dr. Tapia).  2020- starts weekly  Abraxane in combination with pembrolizumab next on pembrolizumab maintenance.    CT CAP 22 : POD bulky L axillary and retropectoral adenopathy, new retroperitoneal and pelvic adenopathy   Ca 125 rising  Did not want chemo. Agreed to pembro/ bevacizumab- started in Oct 2022.    feels well. Monitored clinically and  ( elevated at baseline) . Did not want to go for follow up scans  [de-identified] : poorly differentiated, endometrioid [de-identified] : Ca 125 was elevated at initial diagnosis [de-identified] : TMB  high\par  FoundationOne done Jan 2019- multiple mutations : PALB2 PIK3CA  CHUY, and others  \par  ER moderate  ( path April 2019 )  [de-identified] : Feels well. Nervous about medical appointments. No new c/o. Had knees  steroids injections. Walks better. Weight stable/ gained few lbs. .

## 2024-04-10 LAB — CANCER AG125 SERPL-ACNC: 280 U/ML — HIGH

## 2024-04-30 ENCOUNTER — APPOINTMENT (OUTPATIENT)
Dept: INFUSION THERAPY | Facility: CLINIC | Age: 69
End: 2024-04-30

## 2024-04-30 VITALS
SYSTOLIC BLOOD PRESSURE: 157 MMHG | OXYGEN SATURATION: 100 % | WEIGHT: 112 LBS | TEMPERATURE: 97.9 F | HEART RATE: 99 BPM | DIASTOLIC BLOOD PRESSURE: 77 MMHG | HEIGHT: 59 IN | BODY MASS INDEX: 22.58 KG/M2

## 2024-05-20 ENCOUNTER — OUTPATIENT (OUTPATIENT)
Dept: OUTPATIENT SERVICES | Facility: HOSPITAL | Age: 69
LOS: 1 days | Discharge: HOME | End: 2024-05-20

## 2024-05-20 DIAGNOSIS — C54.1 MALIGNANT NEOPLASM OF ENDOMETRIUM: ICD-10-CM

## 2024-05-21 ENCOUNTER — RESULT REVIEW (OUTPATIENT)
Age: 69
End: 2024-05-21

## 2024-05-21 ENCOUNTER — RESULT CHARGE (OUTPATIENT)
Age: 69
End: 2024-05-21

## 2024-05-21 ENCOUNTER — APPOINTMENT (OUTPATIENT)
Dept: INFUSION THERAPY | Facility: CLINIC | Age: 69
End: 2024-05-21

## 2024-05-21 VITALS
WEIGHT: 109 LBS | BODY MASS INDEX: 21.97 KG/M2 | SYSTOLIC BLOOD PRESSURE: 153 MMHG | OXYGEN SATURATION: 99 % | DIASTOLIC BLOOD PRESSURE: 79 MMHG | RESPIRATION RATE: 16 BRPM | HEART RATE: 95 BPM | TEMPERATURE: 98.1 F | HEIGHT: 59 IN

## 2024-05-21 LAB
ALBUMIN SERPL ELPH-MCNC: 4.2 G/DL — SIGNIFICANT CHANGE UP (ref 3.3–5)
ALP SERPL-CCNC: 105 U/L — SIGNIFICANT CHANGE UP (ref 40–120)
ALT FLD-CCNC: 14 U/L — SIGNIFICANT CHANGE UP (ref 10–45)
ANION GAP SERPL CALC-SCNC: 15 MMOL/L — SIGNIFICANT CHANGE UP (ref 5–17)
AST SERPL-CCNC: 17 U/L — SIGNIFICANT CHANGE UP (ref 10–40)
BASOPHILS # BLD AUTO: 0.05 K/UL — SIGNIFICANT CHANGE UP (ref 0–0.2)
BASOPHILS NFR BLD AUTO: 0.6 % — SIGNIFICANT CHANGE UP (ref 0–2)
BILIRUB SERPL-MCNC: 0.8 MG/DL — SIGNIFICANT CHANGE UP (ref 0.2–1.2)
BUN SERPL-MCNC: 15 MG/DL — SIGNIFICANT CHANGE UP (ref 7–23)
CALCIUM SERPL-MCNC: 9.3 MG/DL — SIGNIFICANT CHANGE UP (ref 8.4–10.5)
CANCER AG125 SERPL-ACNC: 127 U/ML — HIGH
CHLORIDE SERPL-SCNC: 102 MMOL/L — SIGNIFICANT CHANGE UP (ref 96–108)
CO2 SERPL-SCNC: 21 MMOL/L — LOW (ref 22–31)
CREAT SERPL-MCNC: 0.52 MG/DL — SIGNIFICANT CHANGE UP (ref 0.5–1.3)
EGFR: 101 ML/MIN/1.73M2 — SIGNIFICANT CHANGE UP
EOSINOPHIL # BLD AUTO: 0.06 K/UL — SIGNIFICANT CHANGE UP (ref 0–0.5)
EOSINOPHIL NFR BLD AUTO: 0.7 % — SIGNIFICANT CHANGE UP (ref 0–6)
GLUCOSE SERPL-MCNC: 157 MG/DL — HIGH (ref 70–99)
HCT VFR BLD CALC: 40 % — SIGNIFICANT CHANGE UP (ref 34.5–45)
HGB BLD-MCNC: 14 G/DL — SIGNIFICANT CHANGE UP (ref 11.5–15.5)
IMM GRANULOCYTES NFR BLD AUTO: 0.3 % — SIGNIFICANT CHANGE UP (ref 0–0.9)
LYMPHOCYTES # BLD AUTO: 0.94 K/UL — LOW (ref 1–3.3)
LYMPHOCYTES # BLD AUTO: 10.6 % — LOW (ref 13–44)
MCHC RBC-ENTMCNC: 31.7 PG — SIGNIFICANT CHANGE UP (ref 27–34)
MCHC RBC-ENTMCNC: 35 GM/DL — SIGNIFICANT CHANGE UP (ref 32–36)
MCV RBC AUTO: 90.5 FL — SIGNIFICANT CHANGE UP (ref 80–100)
MONOCYTES # BLD AUTO: 0.66 K/UL — SIGNIFICANT CHANGE UP (ref 0–0.9)
MONOCYTES NFR BLD AUTO: 7.5 % — SIGNIFICANT CHANGE UP (ref 2–14)
NEUTROPHILS # BLD AUTO: 7.09 K/UL — SIGNIFICANT CHANGE UP (ref 1.8–7.4)
NEUTROPHILS NFR BLD AUTO: 80.3 % — HIGH (ref 43–77)
NRBC # BLD: 0 /100 WBCS — SIGNIFICANT CHANGE UP (ref 0–0)
PLATELET # BLD AUTO: 364 K/UL — SIGNIFICANT CHANGE UP (ref 150–400)
POTASSIUM SERPL-MCNC: 4.4 MMOL/L — SIGNIFICANT CHANGE UP (ref 3.5–5.3)
POTASSIUM SERPL-SCNC: 4.4 MMOL/L — SIGNIFICANT CHANGE UP (ref 3.5–5.3)
PROT SERPL-MCNC: 6.8 G/DL — SIGNIFICANT CHANGE UP (ref 6–8.3)
RBC # BLD: 4.42 M/UL — SIGNIFICANT CHANGE UP (ref 3.8–5.2)
RBC # FLD: 13.4 % — SIGNIFICANT CHANGE UP (ref 10.3–14.5)
SODIUM SERPL-SCNC: 138 MMOL/L — SIGNIFICANT CHANGE UP (ref 135–145)
WBC # BLD: 8.83 K/UL — SIGNIFICANT CHANGE UP (ref 3.8–10.5)
WBC # FLD AUTO: 8.83 K/UL — SIGNIFICANT CHANGE UP (ref 3.8–10.5)

## 2024-05-21 RX ORDER — PEMBROLIZUMAB 25 MG/ML
0 INJECTION, SOLUTION INTRAVENOUS
Qty: 0 | Refills: 0 | DISCHARGE

## 2024-05-21 RX ORDER — IRBESARTAN 75 MG/1
1 TABLET ORAL
Qty: 0 | Refills: 0 | DISCHARGE

## 2024-05-21 RX ORDER — ALPRAZOLAM 0.25 MG
1 TABLET ORAL
Qty: 0 | Refills: 0 | DISCHARGE

## 2024-05-21 RX ORDER — PREGABALIN 225 MG/1
0 CAPSULE ORAL
Qty: 0 | Refills: 0 | DISCHARGE

## 2024-05-22 DIAGNOSIS — Z51.11 ENCOUNTER FOR ANTINEOPLASTIC CHEMOTHERAPY: ICD-10-CM

## 2024-05-27 NOTE — END OF VISIT
[Time Spent: ___ minutes] : I have spent [unfilled] minutes of time on the encounter.
Hpi Title: Evaluation of Skin Lesions
There are no Wet Read(s) to document.

## 2024-06-11 ENCOUNTER — APPOINTMENT (OUTPATIENT)
Dept: INFUSION THERAPY | Facility: CLINIC | Age: 69
End: 2024-06-11

## 2024-06-11 ENCOUNTER — RESULT REVIEW (OUTPATIENT)
Age: 69
End: 2024-06-11

## 2024-06-11 LAB — 24R-OH-CALCIDIOL SERPL-MCNC: 24.2 NG/ML — LOW (ref 30–80)

## 2024-06-12 RX ORDER — ERGOCALCIFEROL 1.25 MG/1
1.25 MG CAPSULE, LIQUID FILLED ORAL
Qty: 12 | Refills: 3 | Status: ACTIVE | COMMUNITY
Start: 2018-12-06 | End: 1900-01-01

## 2024-07-02 ENCOUNTER — RESULT REVIEW (OUTPATIENT)
Age: 69
End: 2024-07-02

## 2024-07-02 ENCOUNTER — APPOINTMENT (OUTPATIENT)
Dept: INFUSION THERAPY | Facility: CLINIC | Age: 69
End: 2024-07-02

## 2024-07-02 ENCOUNTER — RESULT CHARGE (OUTPATIENT)
Age: 69
End: 2024-07-02

## 2024-07-02 LAB
ALBUMIN SERPL ELPH-MCNC: 4.1 G/DL — SIGNIFICANT CHANGE UP (ref 3.3–5)
ALP SERPL-CCNC: 98 U/L — SIGNIFICANT CHANGE UP (ref 40–120)
ALT FLD-CCNC: 13 U/L — SIGNIFICANT CHANGE UP (ref 10–45)
ANION GAP SERPL CALC-SCNC: 11 MMOL/L — SIGNIFICANT CHANGE UP (ref 5–17)
AST SERPL-CCNC: 15 U/L — SIGNIFICANT CHANGE UP (ref 10–40)
BASOPHILS # BLD AUTO: 0.04 K/UL — SIGNIFICANT CHANGE UP (ref 0–0.2)
BASOPHILS NFR BLD AUTO: 0.6 % — SIGNIFICANT CHANGE UP (ref 0–2)
BILIRUB SERPL-MCNC: 0.5 MG/DL — SIGNIFICANT CHANGE UP (ref 0.2–1.2)
BUN SERPL-MCNC: 13 MG/DL — SIGNIFICANT CHANGE UP (ref 7–23)
CALCIUM SERPL-MCNC: 9.3 MG/DL — SIGNIFICANT CHANGE UP (ref 8.4–10.5)
CANCER AG125 SERPL-ACNC: 99 U/ML — HIGH
CHLORIDE SERPL-SCNC: 103 MMOL/L — SIGNIFICANT CHANGE UP (ref 96–108)
CO2 SERPL-SCNC: 24 MMOL/L — SIGNIFICANT CHANGE UP (ref 22–31)
CREAT SERPL-MCNC: 0.53 MG/DL — SIGNIFICANT CHANGE UP (ref 0.5–1.3)
EGFR: 101 ML/MIN/1.73M2 — SIGNIFICANT CHANGE UP
EOSINOPHIL # BLD AUTO: 0.05 K/UL — SIGNIFICANT CHANGE UP (ref 0–0.5)
EOSINOPHIL NFR BLD AUTO: 0.7 % — SIGNIFICANT CHANGE UP (ref 0–6)
GLUCOSE SERPL-MCNC: 123 MG/DL — HIGH (ref 70–99)
HCT VFR BLD CALC: 40.1 % — SIGNIFICANT CHANGE UP (ref 34.5–45)
HGB BLD-MCNC: 13.9 G/DL — SIGNIFICANT CHANGE UP (ref 11.5–15.5)
IMM GRANULOCYTES NFR BLD AUTO: 0.3 % — SIGNIFICANT CHANGE UP (ref 0–0.9)
LYMPHOCYTES # BLD AUTO: 0.66 K/UL — LOW (ref 1–3.3)
LYMPHOCYTES # BLD AUTO: 9.3 % — LOW (ref 13–44)
MCHC RBC-ENTMCNC: 32 PG — SIGNIFICANT CHANGE UP (ref 27–34)
MCHC RBC-ENTMCNC: 34.7 GM/DL — SIGNIFICANT CHANGE UP (ref 32–36)
MCV RBC AUTO: 92.4 FL — SIGNIFICANT CHANGE UP (ref 80–100)
MONOCYTES # BLD AUTO: 0.56 K/UL — SIGNIFICANT CHANGE UP (ref 0–0.9)
MONOCYTES NFR BLD AUTO: 7.9 % — SIGNIFICANT CHANGE UP (ref 2–14)
NEUTROPHILS # BLD AUTO: 5.75 K/UL — SIGNIFICANT CHANGE UP (ref 1.8–7.4)
NEUTROPHILS NFR BLD AUTO: 81.2 % — HIGH (ref 43–77)
NRBC # BLD: 0 /100 WBCS — SIGNIFICANT CHANGE UP (ref 0–0)
PLATELET # BLD AUTO: 392 K/UL — SIGNIFICANT CHANGE UP (ref 150–400)
POTASSIUM SERPL-MCNC: 4.5 MMOL/L — SIGNIFICANT CHANGE UP (ref 3.5–5.3)
POTASSIUM SERPL-SCNC: 4.5 MMOL/L — SIGNIFICANT CHANGE UP (ref 3.5–5.3)
PROT SERPL-MCNC: 6.9 G/DL — SIGNIFICANT CHANGE UP (ref 6–8.3)
RBC # BLD: 4.34 M/UL — SIGNIFICANT CHANGE UP (ref 3.8–5.2)
RBC # FLD: 13.5 % — SIGNIFICANT CHANGE UP (ref 10.3–14.5)
SODIUM SERPL-SCNC: 138 MMOL/L — SIGNIFICANT CHANGE UP (ref 135–145)
T4 FREE+ TSH PNL SERPL: 1.48 UIU/ML — SIGNIFICANT CHANGE UP (ref 0.27–4.2)
WBC # BLD: 7.08 K/UL — SIGNIFICANT CHANGE UP (ref 3.8–10.5)
WBC # FLD AUTO: 7.08 K/UL — SIGNIFICANT CHANGE UP (ref 3.8–10.5)

## 2024-07-22 ENCOUNTER — OUTPATIENT (OUTPATIENT)
Dept: OUTPATIENT SERVICES | Facility: HOSPITAL | Age: 69
LOS: 1 days | Discharge: HOME | End: 2024-07-22

## 2024-07-22 DIAGNOSIS — C54.1 MALIGNANT NEOPLASM OF ENDOMETRIUM: ICD-10-CM

## 2024-07-23 ENCOUNTER — APPOINTMENT (OUTPATIENT)
Dept: INFUSION THERAPY | Facility: CLINIC | Age: 69
End: 2024-07-23

## 2024-07-23 VITALS
SYSTOLIC BLOOD PRESSURE: 138 MMHG | OXYGEN SATURATION: 99 % | TEMPERATURE: 97.6 F | BODY MASS INDEX: 21.97 KG/M2 | HEART RATE: 91 BPM | HEIGHT: 59 IN | WEIGHT: 109 LBS | DIASTOLIC BLOOD PRESSURE: 83 MMHG

## 2024-07-24 DIAGNOSIS — Z51.11 ENCOUNTER FOR ANTINEOPLASTIC CHEMOTHERAPY: ICD-10-CM

## 2024-08-13 ENCOUNTER — RESULT REVIEW (OUTPATIENT)
Age: 69
End: 2024-08-13

## 2024-08-13 ENCOUNTER — APPOINTMENT (OUTPATIENT)
Dept: HEMATOLOGY ONCOLOGY | Facility: CLINIC | Age: 69
End: 2024-08-13
Payer: MEDICARE

## 2024-08-13 ENCOUNTER — RESULT CHARGE (OUTPATIENT)
Age: 69
End: 2024-08-13

## 2024-08-13 ENCOUNTER — APPOINTMENT (OUTPATIENT)
Dept: INFUSION THERAPY | Facility: CLINIC | Age: 69
End: 2024-08-13
Payer: MEDICARE

## 2024-08-13 VITALS
WEIGHT: 115 LBS | HEIGHT: 59 IN | TEMPERATURE: 98.1 F | SYSTOLIC BLOOD PRESSURE: 155 MMHG | OXYGEN SATURATION: 100 % | DIASTOLIC BLOOD PRESSURE: 82 MMHG | HEART RATE: 108 BPM | BODY MASS INDEX: 23.18 KG/M2

## 2024-08-13 DIAGNOSIS — Z51.12 ENCOUNTER FOR ANTINEOPLASTIC IMMUNOTHERAPY: ICD-10-CM

## 2024-08-13 DIAGNOSIS — C54.1 MALIGNANT NEOPLASM OF ENDOMETRIUM: ICD-10-CM

## 2024-08-13 LAB
ALBUMIN SERPL ELPH-MCNC: 4 G/DL — SIGNIFICANT CHANGE UP (ref 3.3–5)
ALP SERPL-CCNC: 101 U/L — SIGNIFICANT CHANGE UP (ref 40–120)
ALT FLD-CCNC: 29 U/L — SIGNIFICANT CHANGE UP (ref 10–45)
ANION GAP SERPL CALC-SCNC: 16 MMOL/L — SIGNIFICANT CHANGE UP (ref 5–17)
AST SERPL-CCNC: 22 U/L — SIGNIFICANT CHANGE UP (ref 10–40)
BASOPHILS # BLD AUTO: 0.04 K/UL — SIGNIFICANT CHANGE UP (ref 0–0.2)
BASOPHILS NFR BLD AUTO: 0.6 % — SIGNIFICANT CHANGE UP (ref 0–2)
BILIRUB SERPL-MCNC: 0.7 MG/DL — SIGNIFICANT CHANGE UP (ref 0.2–1.2)
BUN SERPL-MCNC: 13 MG/DL — SIGNIFICANT CHANGE UP (ref 7–23)
CALCIUM SERPL-MCNC: 9.3 MG/DL — SIGNIFICANT CHANGE UP (ref 8.4–10.5)
CANCER AG125 SERPL-ACNC: 115 U/ML — HIGH
CHLORIDE SERPL-SCNC: 101 MMOL/L — SIGNIFICANT CHANGE UP (ref 96–108)
CO2 SERPL-SCNC: 22 MMOL/L — SIGNIFICANT CHANGE UP (ref 22–31)
CREAT SERPL-MCNC: 0.49 MG/DL — LOW (ref 0.5–1.3)
EGFR: 103 ML/MIN/1.73M2 — SIGNIFICANT CHANGE UP
EOSINOPHIL # BLD AUTO: 0.11 K/UL — SIGNIFICANT CHANGE UP (ref 0–0.5)
EOSINOPHIL NFR BLD AUTO: 1.6 % — SIGNIFICANT CHANGE UP (ref 0–6)
GLUCOSE SERPL-MCNC: 134 MG/DL — HIGH (ref 70–99)
HCT VFR BLD CALC: 39 % — SIGNIFICANT CHANGE UP (ref 34.5–45)
HGB BLD-MCNC: 13.4 G/DL — SIGNIFICANT CHANGE UP (ref 11.5–15.5)
IMM GRANULOCYTES NFR BLD AUTO: 0.3 % — SIGNIFICANT CHANGE UP (ref 0–0.9)
LYMPHOCYTES # BLD AUTO: 0.71 K/UL — LOW (ref 1–3.3)
LYMPHOCYTES # BLD AUTO: 10.3 % — LOW (ref 13–44)
MCHC RBC-ENTMCNC: 32.1 PG — SIGNIFICANT CHANGE UP (ref 27–34)
MCHC RBC-ENTMCNC: 34.4 GM/DL — SIGNIFICANT CHANGE UP (ref 32–36)
MCV RBC AUTO: 93.3 FL — SIGNIFICANT CHANGE UP (ref 80–100)
MONOCYTES # BLD AUTO: 0.52 K/UL — SIGNIFICANT CHANGE UP (ref 0–0.9)
MONOCYTES NFR BLD AUTO: 7.5 % — SIGNIFICANT CHANGE UP (ref 2–14)
NEUTROPHILS # BLD AUTO: 5.52 K/UL — SIGNIFICANT CHANGE UP (ref 1.8–7.4)
NEUTROPHILS NFR BLD AUTO: 79.7 % — HIGH (ref 43–77)
NRBC # BLD: 0 /100 WBCS — SIGNIFICANT CHANGE UP (ref 0–0)
PLATELET # BLD AUTO: 319 K/UL — SIGNIFICANT CHANGE UP (ref 150–400)
POTASSIUM SERPL-MCNC: 4.2 MMOL/L — SIGNIFICANT CHANGE UP (ref 3.5–5.3)
POTASSIUM SERPL-SCNC: 4.2 MMOL/L — SIGNIFICANT CHANGE UP (ref 3.5–5.3)
PROT SERPL-MCNC: 6.5 G/DL — SIGNIFICANT CHANGE UP (ref 6–8.3)
RBC # BLD: 4.18 M/UL — SIGNIFICANT CHANGE UP (ref 3.8–5.2)
RBC # FLD: 13.4 % — SIGNIFICANT CHANGE UP (ref 10.3–14.5)
SODIUM SERPL-SCNC: 139 MMOL/L — SIGNIFICANT CHANGE UP (ref 135–145)
WBC # BLD: 6.92 K/UL — SIGNIFICANT CHANGE UP (ref 3.8–10.5)
WBC # FLD AUTO: 6.92 K/UL — SIGNIFICANT CHANGE UP (ref 3.8–10.5)

## 2024-08-13 PROCEDURE — 99214 OFFICE O/P EST MOD 30 MIN: CPT

## 2024-08-13 NOTE — HISTORY OF PRESENT ILLNESS
[Disease: _____________________] : Disease: [unfilled] [AJCC Stage: ____] : AJCC Stage: [unfilled] [de-identified] : 65 years old postmenopausal  female , with stage III C poorly differentiated, endometrioid carcinoma of the uterus diagnosed in 2018.  CASE SYNOPSIS:  2018: Cervical mass biopsy - poorly differentiated carcinoma, favoring endometrial origin.  2018: Pelvic US (performed at [facility]): uterus size 9.9 x 5.3x 4.4 cm, endometrial mucosa thickness 24 mm, cervix with lobular appearance and heterogenous echo texture. Adnexae unremarkable, but a heterogenous solid  mass, measuring 3.8 x 3.6 x 2.8 cm, is described adjacent to the right ovary.  2018: CT C/A/P: No evidence of distant metastases; significant distension endometrial cavity (16 mm in size), extending into enlarged bulky cervix, with questionable proximal vaginal extension. Significant, diffuse retroperitoneal and pelvic lymphadenopathy ( lymph nodes in right pelvis abutting the right ovary, as seen on pelvis ultrasound.  2018: Pathology review [facility]: poorly differentiated, endometrioid adenocarcinoma; IHC non- contributory; cannot distinguished cervical vs endometrial origin.  2018: Started  Taxol/Carbo.  2019:  Foundation One study consistent with MSI- H, TBM- high status, no other actionable target gene alterations.  2019: PET/CT - decrease in FDG avidity in both the endometrial cavity, and in the retroperitoneal and abdominopelvic lymphadenopathy. Unchanged size  of bilateral retrocrural LAD. Multiple FDG avid left axillary and retropectoral lymph nodes (? new)- for which FNA recommended. Unchanged, minimally changed FDG avid nodular opacity RML.  19: Mammography: Area of architectural distortion central left breast, correlating with US -described lobulated hypoechoic mass left breast 12:00, 5 cm from the nipple. Breast ultrasound: Left breast 12:00 5 cm from the nipple lobulated hypoechoic mass with associated architectural distortion. Multiple abnormal left axillary lymph nodes with focal cortical thickening. Ultrasound-guided core biopsy recommended for both breast mass and axillary lymph node.  A 12:00 retroareolar 0.4 cm intraductal mass, for which ultrasound guided core biopsy recommended.  2019: Patient developed rash on upper extremities after D1, cycle #6 Carboplatin; chemotherapy discontinued.  2019: US -guided left breast biopsy - pathology breast consistent with intraductal papilloma left breast with ductal hyperplasia, adenosis, apocrine metaplasia.  Left axillary LN biopsy - metastatic adenocarcinoma with papillary features, compatible with metastatic disease from known endometrial primary.  19- started Pembrolizumab.  10/1/19- admitted at [facility] with KATHRYN and DKA; diagnosed with type I diabetes, and started insulin sliding scale.  10/1/19: CT C/A/P-overall improvement in disease burden in the uterus and lymph nodes compared to 18.  Persistent 1.2 cm nodular opacity along the right major fissure. No acute chest pathology, no PE.  2020 CT C/A/P-progression of endometrioid carcinoma of the uterus with increasing endometrial thickness and increasing soft tissue.  There now appears to be invasion of the myometrium.  Increasing pelvic lymphadenopathy.  Stable nodular opacity along the right major fissure.  2020: CT A/P-increasing right-sided pelvic adenopathy; increasing fluid distention of the endometrial cavity.  Stable opacity right lung, middle lobe  -2020-received 3750 cGy to pelvis (Dr. Tapia).  2020- starts weekly  Abraxane in combination with pembrolizumab next on pembrolizumab maintenance.    CT CAP 22 : POD bulky L axillary and retropectoral adenopathy, new retroperitoneal and pelvic adenopathy   Ca 125 rising  Did not want chemo. Agreed to pembro/ bevacizumab- started in Oct 2022.    feels well. Monitored clinically and  ( elevated at baseline) . Did not want to go for follow up scans  [de-identified] : Ca 125 was elevated at initial diagnosis [de-identified] : poorly differentiated, endometrioid [de-identified] : TMB  high\par  FoundationOne done Jan 2019- multiple mutations : PALB2 PIK3CA  CHUY, and others  \par  ER moderate  ( path April 2019 )  [de-identified] : Feels well. As always she is very nervous about medical appointments. No new c/o. She is active. Good energy. Gained some weight.

## 2024-08-13 NOTE — PHYSICAL EXAM
[Restricted in physically strenuous activity but ambulatory and able to carry out work of a light or sedentary nature] : Status 1- Restricted in physically strenuous activity but ambulatory and able to carry out work of a light or sedentary nature, e.g., light house work, office work [Normal] : full range of motion and no deformities appreciated [de-identified] : looks well  [de-identified] : nervous

## 2024-08-13 NOTE — ASSESSMENT
[FreeTextEntry1] : 67 y/o female diagnosed in 2018 poorly diff endometrioid uterine cancer ( extensive metastatic retroperitoneal VITO, later found to have metastatic disease in axillary node and pulm nodule ) MSI H dgn in 2018 ; s/p Taxol/ catbo; s/p Abraxane, on pembrolizumab since May 2019.  Clinically doing well. Asymptomatic but scans in FAll 2022 showed disease progression- enlarging axillary retropectoral and retroperit pelvic VITO.  Declined chemotherapy.  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   Asymptomatic.  Ca 125 continues to trend down.   Clinically doing very  well.  continue current tx.  She is very reluctant to go for routine scans but now she agreed to go for scans at the end of the year ( CT chest abd pelvis with contrast) - for new baseline ( prior scans in Sept 2022)   Exam in Jan after scans/ sooner PRN.

## 2024-08-13 NOTE — REVIEW OF SYSTEMS
[Recent Change In Weight] : ~T recent weight change [Dysmenorrhea/Abn Vaginal Bleeding] : dysmenorrhea/abnormal vaginal bleeding [Joint Pain] : joint pain [Negative] : Allergic/Immunologic [Cough] : no cough [Abdominal Pain] : no abdominal pain [Vomiting] : no vomiting [Skin Rash] : no skin rash [FreeTextEntry2] : gained few lbs  [FreeTextEntry9] : not new

## 2024-09-03 ENCOUNTER — APPOINTMENT (OUTPATIENT)
Dept: INFUSION THERAPY | Facility: CLINIC | Age: 69
End: 2024-09-03

## 2024-09-03 VITALS
SYSTOLIC BLOOD PRESSURE: 143 MMHG | DIASTOLIC BLOOD PRESSURE: 83 MMHG | TEMPERATURE: 97.9 F | HEART RATE: 101 BPM | OXYGEN SATURATION: 100 % | HEIGHT: 59 IN

## 2024-09-10 NOTE — ED ADULT NURSE NOTE - SUICIDE SCREENING QUESTION 2
Subjective     Still no BM. Seen by surgery.  Pain is controlled.    Objective     I/O's    Intake/Output Summary (Last 24 hours) at 9/10/2024 1240  Last data filed at 9/10/2024 1019  Gross per 24 hour   Intake 1362.6 ml   Output 875 ml   Net 487.6 ml       Last Recorded Vitals  Blood pressure (!) 138/93, pulse 74, temperature 97.7 °F (36.5 °C), temperature source Oral, resp. rate 18, height 5' 11\" (1.803 m), weight 80.7 kg (177 lb 14.6 oz), SpO2 94%.  Body mass index is 24.81 kg/m².  Review of Systems   Constitutional: Negative.    HENT: Negative.     Eyes: Negative.    Respiratory: Negative.     Cardiovascular: Negative.    Gastrointestinal:  Positive for abdominal pain and constipation.   Genitourinary: Negative.    Musculoskeletal: Negative.    Skin: Negative.    Neurological: Negative.    Endo/Heme/Allergies: Negative.    Psychiatric/Behavioral: Negative.       Physical Exam  Vitals and nursing note reviewed.   Constitutional:       Appearance: Normal appearance. He is normal weight.   HENT:      Head: Normocephalic and atraumatic.      Nose: Nose normal.      Mouth/Throat:      Mouth: Mucous membranes are dry.      Pharynx: Oropharynx is clear.      Neck: Normal range of motion and neck supple.   Eyes:      Extraocular Movements: Extraocular movements intact.      Conjunctiva/sclera: Conjunctivae normal.      Pupils: Pupils are equal, round, and reactive to light.   Cardiovascular:      Rate and Rhythm: Normal rate and regular rhythm.   Pulmonary:      Effort: Pulmonary effort is normal.      Breath sounds: Normal breath sounds.   Abdominal:      General: Bowel sounds are normal.      Tenderness: There is abdominal tenderness.   Musculoskeletal:         General: Normal range of motion.   Skin:     General: Skin is warm and dry.   Neurological:      General: No focal deficit present.      Mental Status: He is alert and oriented to person, place, and time.   Psychiatric:         Mood and Affect: Mood normal.          Behavior: Behavior normal.         Thought Content: Thought content normal.         Judgment: Judgment normal.         Labs   Last WBC:    WBC (K/mcL)   Date Value   09/10/2024 11.4 (H)       LAST RBC:    RBC (mil/mcL)   Date Value   09/10/2024 4.59     LAST HCT:    HCT (%)   Date Value   09/10/2024 46.0     LAST HGB:    HGB (g/dL)   Date Value   09/10/2024 15.4     LAST PLT:    PLT (K/mcL)   Date Value   09/10/2024 377     LAST SODIUM:  Sodium (mmol/L)   Date Value   09/08/2024 137     LAST POTASSIUM:    Potassium (mmol/L)   Date Value   09/08/2024 3.5     LAST CHLORIDE:    Chloride (mmol/L)   Date Value   09/08/2024 107     LAST GLUCOSE:    Glucose (mg/dL)   Date Value   09/08/2024 106 (H)     LAST CALCIUM:  Calcium (mg/dL)   Date Value   09/08/2024 9.5     LAST CO2:    Carbon Dioxide (mmol/L)   Date Value   09/08/2024 22     LAST BUN:  BUN (mg/dL)   Date Value   09/08/2024 21 (H)     LAST CREATININE:    Creatinine (mg/dL)   Date Value   09/08/2024 0.59 (L)     LAST MALBCR:  No results found for: \"MALBCR\"  LAST TROPONIN:  No results found for: \"TROP\"    LAST MICRO:  No results found for: \"MICRO\"    Imaging  LAST EKG:    Encounter Date: 09/01/24   Electrocardiogram 12-Lead   Result Value    Ventricular Rate EKG/Min (BPM) 61    Atrial Rate (BPM) 61    OR-Interval (MSEC) 164    QRS-Interval (MSEC) 86    QT-Interval (MSEC) 446    QTc 449    P Axis (Degrees) 50    R Axis (Degrees) 55    T Axis (Degrees) 24    REPORT TEXT      Normal sinus rhythm  Possible  Left atrial enlargement  Borderline ECG  When compared with ECG of  28-JUL-2021 20:53,  Vent. rate  has decreased  BY  63 BPM  Non-specific change in ST segment in  Anterior leads  Nonspecific T wave abnormality, improved in  Inferior leads  T wave inversion no longer evident in  Anterolateral leads  Confirmed by CHECO MA DO ([id number]) on 9/3/2024 4:01:58 PM         Assessment & Plan   Principal Problem:    Intractable abdominal pain  Active Problems:     Parastomal hernia of abdominal wall    Constipation      S/p colostomy reversal and hernia repair.  Encouraged to ambulate.  Off IV morphine, encouraged to avoid Norco as well.  Miralax changed to BID.  Colace BID.  Possible discharge later today or tomorrow if he has BM.  D/w the patient in great details.     Smoking Cessation  Ready to quit: Not Answered  Counseling given: Not Answered  Tobacco comments: avoid smoking day of surgery.  Patient verbalized understanding.       DVT Prophylaxis  SCD    Code Status    Code Status: Full Resuscitation    Primary Care Physician  Sherwin Mcgowan MD   No

## 2024-09-17 ENCOUNTER — OUTPATIENT (OUTPATIENT)
Dept: OUTPATIENT SERVICES | Facility: HOSPITAL | Age: 69
LOS: 1 days | Discharge: HOME | End: 2024-09-17

## 2024-09-17 DIAGNOSIS — C54.1 MALIGNANT NEOPLASM OF ENDOMETRIUM: ICD-10-CM

## 2024-09-24 ENCOUNTER — RESULT REVIEW (OUTPATIENT)
Age: 69
End: 2024-09-24

## 2024-09-24 ENCOUNTER — RESULT CHARGE (OUTPATIENT)
Age: 69
End: 2024-09-24

## 2024-09-24 ENCOUNTER — APPOINTMENT (OUTPATIENT)
Dept: INFUSION THERAPY | Facility: CLINIC | Age: 69
End: 2024-09-24

## 2024-09-24 VITALS
SYSTOLIC BLOOD PRESSURE: 151 MMHG | BODY MASS INDEX: 23.18 KG/M2 | HEIGHT: 59 IN | OXYGEN SATURATION: 100 % | DIASTOLIC BLOOD PRESSURE: 84 MMHG | TEMPERATURE: 98 F | WEIGHT: 115 LBS | HEART RATE: 85 BPM

## 2024-09-24 LAB
ALBUMIN SERPL ELPH-MCNC: 4.4 G/DL — SIGNIFICANT CHANGE UP (ref 3.3–5)
ALP SERPL-CCNC: 107 U/L — SIGNIFICANT CHANGE UP (ref 40–120)
ALT FLD-CCNC: 18 U/L — SIGNIFICANT CHANGE UP (ref 10–45)
ANION GAP SERPL CALC-SCNC: 14 MMOL/L — SIGNIFICANT CHANGE UP (ref 5–17)
AST SERPL-CCNC: 17 U/L — SIGNIFICANT CHANGE UP (ref 10–40)
BASOPHILS # BLD AUTO: 0.04 K/UL — SIGNIFICANT CHANGE UP (ref 0–0.2)
BASOPHILS NFR BLD AUTO: 0.4 % — SIGNIFICANT CHANGE UP (ref 0–2)
BILIRUB SERPL-MCNC: 0.8 MG/DL — SIGNIFICANT CHANGE UP (ref 0.2–1.2)
BUN SERPL-MCNC: 15 MG/DL — SIGNIFICANT CHANGE UP (ref 7–23)
CALCIUM SERPL-MCNC: 10 MG/DL — SIGNIFICANT CHANGE UP (ref 8.4–10.5)
CANCER AG125 SERPL-ACNC: 146 U/ML — HIGH
CHLORIDE SERPL-SCNC: 100 MMOL/L — SIGNIFICANT CHANGE UP (ref 96–108)
CO2 SERPL-SCNC: 26 MMOL/L — SIGNIFICANT CHANGE UP (ref 22–31)
CREAT SERPL-MCNC: 0.57 MG/DL — SIGNIFICANT CHANGE UP (ref 0.5–1.3)
EGFR: 99 ML/MIN/1.73M2 — SIGNIFICANT CHANGE UP
EOSINOPHIL # BLD AUTO: 0.16 K/UL — SIGNIFICANT CHANGE UP (ref 0–0.5)
EOSINOPHIL NFR BLD AUTO: 1.5 % — SIGNIFICANT CHANGE UP (ref 0–6)
GLUCOSE SERPL-MCNC: 94 MG/DL — SIGNIFICANT CHANGE UP (ref 70–99)
HCT VFR BLD CALC: 43.2 % — SIGNIFICANT CHANGE UP (ref 34.5–45)
HGB BLD-MCNC: 14.6 G/DL — SIGNIFICANT CHANGE UP (ref 11.5–15.5)
IMM GRANULOCYTES NFR BLD AUTO: 0.4 % — SIGNIFICANT CHANGE UP (ref 0–0.9)
LYMPHOCYTES # BLD AUTO: 0.9 K/UL — LOW (ref 1–3.3)
LYMPHOCYTES # BLD AUTO: 8.5 % — LOW (ref 13–44)
MCHC RBC-ENTMCNC: 31.3 PG — SIGNIFICANT CHANGE UP (ref 27–34)
MCHC RBC-ENTMCNC: 33.8 GM/DL — SIGNIFICANT CHANGE UP (ref 32–36)
MCV RBC AUTO: 92.7 FL — SIGNIFICANT CHANGE UP (ref 80–100)
MONOCYTES # BLD AUTO: 0.73 K/UL — SIGNIFICANT CHANGE UP (ref 0–0.9)
MONOCYTES NFR BLD AUTO: 6.9 % — SIGNIFICANT CHANGE UP (ref 2–14)
NEUTROPHILS # BLD AUTO: 8.66 K/UL — HIGH (ref 1.8–7.4)
NEUTROPHILS NFR BLD AUTO: 82.3 % — HIGH (ref 43–77)
NRBC # BLD: 0 /100 WBCS — SIGNIFICANT CHANGE UP (ref 0–0)
PLATELET # BLD AUTO: 412 K/UL — HIGH (ref 150–400)
POTASSIUM SERPL-MCNC: 4.3 MMOL/L — SIGNIFICANT CHANGE UP (ref 3.5–5.3)
POTASSIUM SERPL-SCNC: 4.3 MMOL/L — SIGNIFICANT CHANGE UP (ref 3.5–5.3)
PROT SERPL-MCNC: 7 G/DL — SIGNIFICANT CHANGE UP (ref 6–8.3)
RBC # BLD: 4.66 M/UL — SIGNIFICANT CHANGE UP (ref 3.8–5.2)
RBC # FLD: 13.2 % — SIGNIFICANT CHANGE UP (ref 10.3–14.5)
SODIUM SERPL-SCNC: 140 MMOL/L — SIGNIFICANT CHANGE UP (ref 135–145)
T4 FREE+ TSH PNL SERPL: 1.69 UIU/ML — SIGNIFICANT CHANGE UP (ref 0.27–4.2)
WBC # BLD: 10.53 K/UL — HIGH (ref 3.8–10.5)
WBC # FLD AUTO: 10.53 K/UL — HIGH (ref 3.8–10.5)

## 2024-09-24 NOTE — DISCHARGE INSTRUCTIONS: CHEMOTHERAPY - NSRNDCACTIVITY1_HEME_A_AMB
Per verbal order given by Dr. Motta, patient should be seen for post hospital follow up appointment in 2-3 weeks.  Writer called patient to arrange for appointment; message was left for patient to call office back.   Yes

## 2024-09-25 DIAGNOSIS — Z51.11 ENCOUNTER FOR ANTINEOPLASTIC CHEMOTHERAPY: ICD-10-CM

## 2024-10-15 ENCOUNTER — APPOINTMENT (OUTPATIENT)
Dept: INFUSION THERAPY | Facility: CLINIC | Age: 69
End: 2024-10-15

## 2024-10-15 VITALS
BODY MASS INDEX: 23.18 KG/M2 | HEART RATE: 92 BPM | TEMPERATURE: 98.1 F | HEIGHT: 59 IN | OXYGEN SATURATION: 100 % | SYSTOLIC BLOOD PRESSURE: 157 MMHG | DIASTOLIC BLOOD PRESSURE: 81 MMHG | WEIGHT: 115 LBS

## 2024-11-05 ENCOUNTER — RESULT REVIEW (OUTPATIENT)
Age: 69
End: 2024-11-05

## 2024-11-05 ENCOUNTER — APPOINTMENT (OUTPATIENT)
Dept: INFUSION THERAPY | Facility: CLINIC | Age: 69
End: 2024-11-05

## 2024-11-05 VITALS
WEIGHT: 122 LBS | TEMPERATURE: 98 F | BODY MASS INDEX: 24.6 KG/M2 | HEIGHT: 59 IN | HEART RATE: 91 BPM | OXYGEN SATURATION: 99 % | DIASTOLIC BLOOD PRESSURE: 89 MMHG | SYSTOLIC BLOOD PRESSURE: 154 MMHG

## 2024-11-05 LAB
ALBUMIN SERPL ELPH-MCNC: 4.1 G/DL — SIGNIFICANT CHANGE UP (ref 3.3–5)
ALP SERPL-CCNC: 98 U/L — SIGNIFICANT CHANGE UP (ref 40–120)
ALT FLD-CCNC: 15 U/L — SIGNIFICANT CHANGE UP (ref 10–45)
ANION GAP SERPL CALC-SCNC: 9 MMOL/L — SIGNIFICANT CHANGE UP (ref 5–17)
AST SERPL-CCNC: 15 U/L — SIGNIFICANT CHANGE UP (ref 10–40)
BASOPHILS # BLD AUTO: 0.04 K/UL — SIGNIFICANT CHANGE UP (ref 0–0.2)
BASOPHILS NFR BLD AUTO: 0.6 % — SIGNIFICANT CHANGE UP (ref 0–2)
BILIRUB SERPL-MCNC: 0.7 MG/DL — SIGNIFICANT CHANGE UP (ref 0.2–1.2)
BILIRUB UR QL STRIP: NEGATIVE
BUN SERPL-MCNC: 10 MG/DL — SIGNIFICANT CHANGE UP (ref 7–23)
CALCIUM SERPL-MCNC: 9.2 MG/DL — SIGNIFICANT CHANGE UP (ref 8.4–10.5)
CANCER AG125 SERPL-ACNC: 117 U/ML — HIGH
CHLORIDE SERPL-SCNC: 105 MMOL/L — SIGNIFICANT CHANGE UP (ref 96–108)
CLARITY UR: CLEAR
CO2 SERPL-SCNC: 25 MMOL/L — SIGNIFICANT CHANGE UP (ref 22–31)
COLLECTION METHOD: NORMAL
CREAT SERPL-MCNC: 0.58 MG/DL — SIGNIFICANT CHANGE UP (ref 0.5–1.3)
EGFR: 99 ML/MIN/1.73M2 — SIGNIFICANT CHANGE UP
EOSINOPHIL # BLD AUTO: 0.09 K/UL — SIGNIFICANT CHANGE UP (ref 0–0.5)
EOSINOPHIL NFR BLD AUTO: 1.4 % — SIGNIFICANT CHANGE UP (ref 0–6)
GLUCOSE SERPL-MCNC: 102 MG/DL — HIGH (ref 70–99)
GLUCOSE UR-MCNC: ABNORMAL
HCG UR QL: 1 EU/DL
HCT VFR BLD CALC: 40.4 % — SIGNIFICANT CHANGE UP (ref 34.5–45)
HGB BLD-MCNC: 13.8 G/DL — SIGNIFICANT CHANGE UP (ref 11.5–15.5)
HGB UR QL STRIP.AUTO: NEGATIVE
IMM GRANULOCYTES NFR BLD AUTO: 0.3 % — SIGNIFICANT CHANGE UP (ref 0–0.9)
KETONES UR-MCNC: NEGATIVE
LEUKOCYTE ESTERASE UR QL STRIP: NEGATIVE
LYMPHOCYTES # BLD AUTO: 0.58 K/UL — LOW (ref 1–3.3)
LYMPHOCYTES # BLD AUTO: 9 % — LOW (ref 13–44)
MCHC RBC-ENTMCNC: 31.9 PG — SIGNIFICANT CHANGE UP (ref 27–34)
MCHC RBC-ENTMCNC: 34.2 G/DL — SIGNIFICANT CHANGE UP (ref 32–36)
MCV RBC AUTO: 93.5 FL — SIGNIFICANT CHANGE UP (ref 80–100)
MONOCYTES # BLD AUTO: 0.42 K/UL — SIGNIFICANT CHANGE UP (ref 0–0.9)
MONOCYTES NFR BLD AUTO: 6.5 % — SIGNIFICANT CHANGE UP (ref 2–14)
NEUTROPHILS # BLD AUTO: 5.3 K/UL — SIGNIFICANT CHANGE UP (ref 1.8–7.4)
NEUTROPHILS NFR BLD AUTO: 82.2 % — HIGH (ref 43–77)
NITRITE UR QL STRIP: NEGATIVE
NRBC # BLD: 0 /100 WBCS — SIGNIFICANT CHANGE UP (ref 0–0)
PH UR STRIP: 6
PLATELET # BLD AUTO: 338 K/UL — SIGNIFICANT CHANGE UP (ref 150–400)
POTASSIUM SERPL-MCNC: 4.1 MMOL/L — SIGNIFICANT CHANGE UP (ref 3.5–5.3)
POTASSIUM SERPL-SCNC: 4.1 MMOL/L — SIGNIFICANT CHANGE UP (ref 3.5–5.3)
PROT SERPL-MCNC: 6.3 G/DL — SIGNIFICANT CHANGE UP (ref 6–8.3)
PROT UR STRIP-MCNC: NEGATIVE
RBC # BLD: 4.32 M/UL — SIGNIFICANT CHANGE UP (ref 3.8–5.2)
RBC # FLD: 13.2 % — SIGNIFICANT CHANGE UP (ref 10.3–14.5)
SODIUM SERPL-SCNC: 139 MMOL/L — SIGNIFICANT CHANGE UP (ref 135–145)
SP GR UR STRIP: 1.01
WBC # BLD: 6.45 K/UL — SIGNIFICANT CHANGE UP (ref 3.8–10.5)
WBC # FLD AUTO: 6.45 K/UL — SIGNIFICANT CHANGE UP (ref 3.8–10.5)

## 2024-11-21 ENCOUNTER — OUTPATIENT (OUTPATIENT)
Dept: OUTPATIENT SERVICES | Facility: HOSPITAL | Age: 69
LOS: 1 days | Discharge: HOME | End: 2024-11-21

## 2024-11-21 DIAGNOSIS — C54.1 MALIGNANT NEOPLASM OF ENDOMETRIUM: ICD-10-CM

## 2024-11-26 ENCOUNTER — APPOINTMENT (OUTPATIENT)
Dept: INFUSION THERAPY | Facility: CLINIC | Age: 69
End: 2024-11-26

## 2024-11-26 VITALS
HEIGHT: 59 IN | SYSTOLIC BLOOD PRESSURE: 161 MMHG | TEMPERATURE: 97.6 F | DIASTOLIC BLOOD PRESSURE: 91 MMHG | OXYGEN SATURATION: 98 % | WEIGHT: 121.5 LBS | HEART RATE: 106 BPM | BODY MASS INDEX: 24.49 KG/M2

## 2024-11-27 DIAGNOSIS — Z51.11 ENCOUNTER FOR ANTINEOPLASTIC CHEMOTHERAPY: ICD-10-CM

## 2024-12-17 ENCOUNTER — APPOINTMENT (OUTPATIENT)
Dept: INFUSION THERAPY | Facility: CLINIC | Age: 69
End: 2024-12-17

## 2024-12-17 ENCOUNTER — RESULT REVIEW (OUTPATIENT)
Age: 69
End: 2024-12-17

## 2024-12-17 LAB
ALBUMIN SERPL ELPH-MCNC: 4.1 G/DL — SIGNIFICANT CHANGE UP (ref 3.3–5)
ALP SERPL-CCNC: 91 U/L — SIGNIFICANT CHANGE UP (ref 40–120)
ALT FLD-CCNC: 16 U/L — SIGNIFICANT CHANGE UP (ref 10–45)
ANION GAP SERPL CALC-SCNC: 19 MMOL/L — HIGH (ref 5–17)
AST SERPL-CCNC: 29 U/L — SIGNIFICANT CHANGE UP (ref 10–40)
BASOPHILS # BLD AUTO: 0.03 K/UL — SIGNIFICANT CHANGE UP (ref 0–0.2)
BASOPHILS NFR BLD AUTO: 0.4 % — SIGNIFICANT CHANGE UP (ref 0–2)
BILIRUB SERPL-MCNC: 0.8 MG/DL — SIGNIFICANT CHANGE UP (ref 0.2–1.2)
BUN SERPL-MCNC: 12 MG/DL — SIGNIFICANT CHANGE UP (ref 7–23)
CALCIUM SERPL-MCNC: 9.2 MG/DL — SIGNIFICANT CHANGE UP (ref 8.4–10.5)
CANCER AG125 SERPL-ACNC: 156 U/ML — HIGH
CHLORIDE SERPL-SCNC: 101 MMOL/L — SIGNIFICANT CHANGE UP (ref 96–108)
CO2 SERPL-SCNC: 18 MMOL/L — LOW (ref 22–31)
CREAT SERPL-MCNC: 0.51 MG/DL — SIGNIFICANT CHANGE UP (ref 0.5–1.3)
EGFR: 101 ML/MIN/1.73M2 — SIGNIFICANT CHANGE UP
EOSINOPHIL # BLD AUTO: 0.07 K/UL — SIGNIFICANT CHANGE UP (ref 0–0.5)
EOSINOPHIL NFR BLD AUTO: 0.9 % — SIGNIFICANT CHANGE UP (ref 0–6)
GLUCOSE SERPL-MCNC: 236 MG/DL — HIGH (ref 70–99)
HCT VFR BLD CALC: 41.1 % — SIGNIFICANT CHANGE UP (ref 34.5–45)
HGB BLD-MCNC: 14.1 G/DL — SIGNIFICANT CHANGE UP (ref 11.5–15.5)
IMM GRANULOCYTES NFR BLD AUTO: 0.4 % — SIGNIFICANT CHANGE UP (ref 0–0.9)
LYMPHOCYTES # BLD AUTO: 0.86 K/UL — LOW (ref 1–3.3)
LYMPHOCYTES # BLD AUTO: 10.7 % — LOW (ref 13–44)
MCHC RBC-ENTMCNC: 31.8 PG — SIGNIFICANT CHANGE UP (ref 27–34)
MCHC RBC-ENTMCNC: 34.3 G/DL — SIGNIFICANT CHANGE UP (ref 32–36)
MCV RBC AUTO: 92.6 FL — SIGNIFICANT CHANGE UP (ref 80–100)
MONOCYTES # BLD AUTO: 0.61 K/UL — SIGNIFICANT CHANGE UP (ref 0–0.9)
MONOCYTES NFR BLD AUTO: 7.6 % — SIGNIFICANT CHANGE UP (ref 2–14)
NEUTROPHILS # BLD AUTO: 6.43 K/UL — SIGNIFICANT CHANGE UP (ref 1.8–7.4)
NEUTROPHILS NFR BLD AUTO: 80 % — HIGH (ref 43–77)
NRBC # BLD: 0 /100 WBCS — SIGNIFICANT CHANGE UP (ref 0–0)
PLATELET # BLD AUTO: 291 K/UL — SIGNIFICANT CHANGE UP (ref 150–400)
POTASSIUM SERPL-MCNC: 5 MMOL/L — SIGNIFICANT CHANGE UP (ref 3.5–5.3)
POTASSIUM SERPL-SCNC: 5 MMOL/L — SIGNIFICANT CHANGE UP (ref 3.5–5.3)
PROT SERPL-MCNC: 6.7 G/DL — SIGNIFICANT CHANGE UP (ref 6–8.3)
RBC # BLD: 4.44 M/UL — SIGNIFICANT CHANGE UP (ref 3.8–5.2)
RBC # FLD: 13.1 % — SIGNIFICANT CHANGE UP (ref 10.3–14.5)
SODIUM SERPL-SCNC: 138 MMOL/L — SIGNIFICANT CHANGE UP (ref 135–145)
T4 FREE+ TSH PNL SERPL: 1.57 UIU/ML — SIGNIFICANT CHANGE UP (ref 0.27–4.2)
WBC # BLD: 8.03 K/UL — SIGNIFICANT CHANGE UP (ref 3.8–10.5)
WBC # FLD AUTO: 8.03 K/UL — SIGNIFICANT CHANGE UP (ref 3.8–10.5)

## 2024-12-18 ENCOUNTER — RESULT CHARGE (OUTPATIENT)
Age: 69
End: 2024-12-18

## 2025-01-07 ENCOUNTER — APPOINTMENT (OUTPATIENT)
Dept: INFUSION THERAPY | Facility: CLINIC | Age: 70
End: 2025-01-07

## 2025-01-07 VITALS
SYSTOLIC BLOOD PRESSURE: 147 MMHG | DIASTOLIC BLOOD PRESSURE: 85 MMHG | OXYGEN SATURATION: 99 % | BODY MASS INDEX: 24.19 KG/M2 | WEIGHT: 120 LBS | TEMPERATURE: 97.2 F | HEIGHT: 59 IN | HEART RATE: 87 BPM

## 2025-01-20 ENCOUNTER — OUTPATIENT (OUTPATIENT)
Dept: OUTPATIENT SERVICES | Facility: HOSPITAL | Age: 70
LOS: 1 days | Discharge: HOME | End: 2025-01-20

## 2025-01-20 DIAGNOSIS — C54.1 MALIGNANT NEOPLASM OF ENDOMETRIUM: ICD-10-CM

## 2025-01-28 ENCOUNTER — RESULT REVIEW (OUTPATIENT)
Age: 70
End: 2025-01-28

## 2025-01-28 ENCOUNTER — APPOINTMENT (OUTPATIENT)
Dept: INFUSION THERAPY | Facility: CLINIC | Age: 70
End: 2025-01-28

## 2025-01-28 VITALS
TEMPERATURE: 97.4 F | OXYGEN SATURATION: 99 % | DIASTOLIC BLOOD PRESSURE: 89 MMHG | BODY MASS INDEX: 23.39 KG/M2 | WEIGHT: 116 LBS | HEIGHT: 59 IN | SYSTOLIC BLOOD PRESSURE: 170 MMHG | HEART RATE: 107 BPM

## 2025-01-28 LAB
ALBUMIN SERPL ELPH-MCNC: 4 G/DL — SIGNIFICANT CHANGE UP (ref 3.3–5)
ALP SERPL-CCNC: 117 U/L — SIGNIFICANT CHANGE UP (ref 40–120)
ALT FLD-CCNC: 16 U/L — SIGNIFICANT CHANGE UP (ref 10–45)
ANION GAP SERPL CALC-SCNC: 14 MMOL/L — SIGNIFICANT CHANGE UP (ref 5–17)
AST SERPL-CCNC: 14 U/L — SIGNIFICANT CHANGE UP (ref 10–40)
BASOPHILS # BLD AUTO: 0.04 K/UL — SIGNIFICANT CHANGE UP (ref 0–0.2)
BASOPHILS NFR BLD AUTO: 0.6 % — SIGNIFICANT CHANGE UP (ref 0–2)
BILIRUB SERPL-MCNC: 0.5 MG/DL — SIGNIFICANT CHANGE UP (ref 0.2–1.2)
BUN SERPL-MCNC: 14 MG/DL — SIGNIFICANT CHANGE UP (ref 7–23)
CALCIUM SERPL-MCNC: 9.5 MG/DL — SIGNIFICANT CHANGE UP (ref 8.4–10.5)
CANCER AG125 SERPL-ACNC: 152 U/ML — HIGH
CHLORIDE SERPL-SCNC: 98 MMOL/L — SIGNIFICANT CHANGE UP (ref 96–108)
CO2 SERPL-SCNC: 24 MMOL/L — SIGNIFICANT CHANGE UP (ref 22–31)
CREAT SERPL-MCNC: 0.51 MG/DL — SIGNIFICANT CHANGE UP (ref 0.5–1.3)
EGFR: 101 ML/MIN/1.73M2 — SIGNIFICANT CHANGE UP
EGFR: 101 ML/MIN/1.73M2 — SIGNIFICANT CHANGE UP
EOSINOPHIL # BLD AUTO: 0.02 K/UL — SIGNIFICANT CHANGE UP (ref 0–0.5)
EOSINOPHIL NFR BLD AUTO: 0.3 % — SIGNIFICANT CHANGE UP (ref 0–6)
GLUCOSE SERPL-MCNC: 185 MG/DL — HIGH (ref 70–99)
HCT VFR BLD CALC: 43.7 % — SIGNIFICANT CHANGE UP (ref 34.5–45)
HGB BLD-MCNC: 15 G/DL — SIGNIFICANT CHANGE UP (ref 11.5–15.5)
IMM GRANULOCYTES NFR BLD AUTO: 0.4 % — SIGNIFICANT CHANGE UP (ref 0–0.9)
LYMPHOCYTES # BLD AUTO: 0.82 K/UL — LOW (ref 1–3.3)
LYMPHOCYTES # BLD AUTO: 11.6 % — LOW (ref 13–44)
MCHC RBC-ENTMCNC: 31.3 PG — SIGNIFICANT CHANGE UP (ref 27–34)
MCHC RBC-ENTMCNC: 34.3 G/DL — SIGNIFICANT CHANGE UP (ref 32–36)
MCV RBC AUTO: 91.2 FL — SIGNIFICANT CHANGE UP (ref 80–100)
MONOCYTES # BLD AUTO: 0.6 K/UL — SIGNIFICANT CHANGE UP (ref 0–0.9)
MONOCYTES NFR BLD AUTO: 8.5 % — SIGNIFICANT CHANGE UP (ref 2–14)
NEUTROPHILS # BLD AUTO: 5.57 K/UL — SIGNIFICANT CHANGE UP (ref 1.8–7.4)
NEUTROPHILS NFR BLD AUTO: 78.6 % — HIGH (ref 43–77)
NRBC # BLD: 0 /100 WBCS — SIGNIFICANT CHANGE UP (ref 0–0)
NRBC BLD-RTO: 0 /100 WBCS — SIGNIFICANT CHANGE UP (ref 0–0)
PLATELET # BLD AUTO: 492 K/UL — HIGH (ref 150–400)
POTASSIUM SERPL-MCNC: 4.2 MMOL/L — SIGNIFICANT CHANGE UP (ref 3.5–5.3)
POTASSIUM SERPL-SCNC: 4.2 MMOL/L — SIGNIFICANT CHANGE UP (ref 3.5–5.3)
PROT SERPL-MCNC: 6.8 G/DL — SIGNIFICANT CHANGE UP (ref 6–8.3)
RBC # BLD: 4.79 M/UL — SIGNIFICANT CHANGE UP (ref 3.8–5.2)
RBC # FLD: 13.6 % — SIGNIFICANT CHANGE UP (ref 10.3–14.5)
SODIUM SERPL-SCNC: 136 MMOL/L — SIGNIFICANT CHANGE UP (ref 135–145)
WBC # BLD: 7.08 K/UL — SIGNIFICANT CHANGE UP (ref 3.8–10.5)
WBC # FLD AUTO: 7.08 K/UL — SIGNIFICANT CHANGE UP (ref 3.8–10.5)

## 2025-01-29 DIAGNOSIS — Z51.11 ENCOUNTER FOR ANTINEOPLASTIC CHEMOTHERAPY: ICD-10-CM

## 2025-02-18 ENCOUNTER — APPOINTMENT (OUTPATIENT)
Dept: INFUSION THERAPY | Facility: CLINIC | Age: 70
End: 2025-02-18

## 2025-02-18 VITALS
DIASTOLIC BLOOD PRESSURE: 83 MMHG | BODY MASS INDEX: 24.39 KG/M2 | OXYGEN SATURATION: 99 % | HEART RATE: 113 BPM | HEIGHT: 59 IN | TEMPERATURE: 97.3 F | SYSTOLIC BLOOD PRESSURE: 147 MMHG | WEIGHT: 121 LBS

## 2025-03-11 ENCOUNTER — RESULT REVIEW (OUTPATIENT)
Age: 70
End: 2025-03-11

## 2025-03-11 ENCOUNTER — RESULT CHARGE (OUTPATIENT)
Age: 70
End: 2025-03-11

## 2025-03-11 ENCOUNTER — APPOINTMENT (OUTPATIENT)
Dept: INFUSION THERAPY | Facility: CLINIC | Age: 70
End: 2025-03-11

## 2025-03-11 VITALS
BODY MASS INDEX: 23.99 KG/M2 | SYSTOLIC BLOOD PRESSURE: 160 MMHG | TEMPERATURE: 97.2 F | HEART RATE: 92 BPM | OXYGEN SATURATION: 100 % | DIASTOLIC BLOOD PRESSURE: 83 MMHG | WEIGHT: 119 LBS | HEIGHT: 59 IN

## 2025-03-11 LAB
ALBUMIN SERPL ELPH-MCNC: 4 G/DL — SIGNIFICANT CHANGE UP (ref 3.3–5)
ALP SERPL-CCNC: 121 U/L — HIGH (ref 40–120)
ALT FLD-CCNC: 15 U/L — SIGNIFICANT CHANGE UP (ref 10–45)
ANION GAP SERPL CALC-SCNC: 13 MMOL/L — SIGNIFICANT CHANGE UP (ref 5–17)
AST SERPL-CCNC: 14 U/L — SIGNIFICANT CHANGE UP (ref 10–40)
BASOPHILS # BLD AUTO: 0.03 K/UL — SIGNIFICANT CHANGE UP (ref 0–0.2)
BASOPHILS NFR BLD AUTO: 0.4 % — SIGNIFICANT CHANGE UP (ref 0–2)
BILIRUB SERPL-MCNC: 0.7 MG/DL — SIGNIFICANT CHANGE UP (ref 0.2–1.2)
BILIRUB UR QL STRIP: NEGATIVE
BUN SERPL-MCNC: 13 MG/DL — SIGNIFICANT CHANGE UP (ref 7–23)
CALCIUM SERPL-MCNC: 9.4 MG/DL — SIGNIFICANT CHANGE UP (ref 8.4–10.5)
CANCER AG125 SERPL-ACNC: 164 U/ML — HIGH
CHLORIDE SERPL-SCNC: 100 MMOL/L — SIGNIFICANT CHANGE UP (ref 96–108)
CO2 SERPL-SCNC: 25 MMOL/L — SIGNIFICANT CHANGE UP (ref 22–31)
CREAT SERPL-MCNC: 0.51 MG/DL — SIGNIFICANT CHANGE UP (ref 0.5–1.3)
EGFR: 101 ML/MIN/1.73M2 — SIGNIFICANT CHANGE UP
EGFR: 101 ML/MIN/1.73M2 — SIGNIFICANT CHANGE UP
EOSINOPHIL # BLD AUTO: 0.06 K/UL — SIGNIFICANT CHANGE UP (ref 0–0.5)
EOSINOPHIL NFR BLD AUTO: 0.8 % — SIGNIFICANT CHANGE UP (ref 0–6)
GLUCOSE SERPL-MCNC: 244 MG/DL — HIGH (ref 70–99)
GLUCOSE UR-MCNC: ABNORMAL
HCG UR QL: 1 EU/DL
HCT VFR BLD CALC: 40.1 % — SIGNIFICANT CHANGE UP (ref 34.5–45)
HGB BLD-MCNC: 13.7 G/DL — SIGNIFICANT CHANGE UP (ref 11.5–15.5)
HGB UR QL STRIP.AUTO: ABNORMAL
IMM GRANULOCYTES NFR BLD AUTO: 0.4 % — SIGNIFICANT CHANGE UP (ref 0–0.9)
KETONES UR-MCNC: NEGATIVE
LEUKOCYTE ESTERASE UR QL STRIP: NEGATIVE
LYMPHOCYTES # BLD AUTO: 0.65 K/UL — LOW (ref 1–3.3)
LYMPHOCYTES # BLD AUTO: 9.1 % — LOW (ref 13–44)
MCHC RBC-ENTMCNC: 31.4 PG — SIGNIFICANT CHANGE UP (ref 27–34)
MCHC RBC-ENTMCNC: 34.2 G/DL — SIGNIFICANT CHANGE UP (ref 32–36)
MCV RBC AUTO: 92 FL — SIGNIFICANT CHANGE UP (ref 80–100)
MONOCYTES # BLD AUTO: 0.58 K/UL — SIGNIFICANT CHANGE UP (ref 0–0.9)
MONOCYTES NFR BLD AUTO: 8.1 % — SIGNIFICANT CHANGE UP (ref 2–14)
NEUTROPHILS # BLD AUTO: 5.81 K/UL — SIGNIFICANT CHANGE UP (ref 1.8–7.4)
NEUTROPHILS NFR BLD AUTO: 81.2 % — HIGH (ref 43–77)
NITRITE UR QL STRIP: NEGATIVE
NRBC BLD AUTO-RTO: 0 /100 WBCS — SIGNIFICANT CHANGE UP (ref 0–0)
PH UR STRIP: 5.5
PLATELET # BLD AUTO: 310 K/UL — SIGNIFICANT CHANGE UP (ref 150–400)
POTASSIUM SERPL-MCNC: 4.2 MMOL/L — SIGNIFICANT CHANGE UP (ref 3.5–5.3)
POTASSIUM SERPL-SCNC: 4.2 MMOL/L — SIGNIFICANT CHANGE UP (ref 3.5–5.3)
PROT SERPL-MCNC: 6.5 G/DL — SIGNIFICANT CHANGE UP (ref 6–8.3)
PROT UR STRIP-MCNC: NEGATIVE
RBC # BLD: 4.36 M/UL — SIGNIFICANT CHANGE UP (ref 3.8–5.2)
RBC # FLD: 13.8 % — SIGNIFICANT CHANGE UP (ref 10.3–14.5)
SODIUM SERPL-SCNC: 137 MMOL/L — SIGNIFICANT CHANGE UP (ref 135–145)
SP GR UR STRIP: 1.02
T4 FREE+ TSH PNL SERPL: 1.5 UIU/ML — SIGNIFICANT CHANGE UP (ref 0.27–4.2)
WBC # BLD: 7.16 K/UL — SIGNIFICANT CHANGE UP (ref 3.8–10.5)
WBC # FLD AUTO: 7.16 K/UL — SIGNIFICANT CHANGE UP (ref 3.8–10.5)

## 2025-03-27 ENCOUNTER — OUTPATIENT (OUTPATIENT)
Dept: OUTPATIENT SERVICES | Facility: HOSPITAL | Age: 70
LOS: 1 days | Discharge: HOME | End: 2025-03-27

## 2025-03-27 DIAGNOSIS — C54.1 MALIGNANT NEOPLASM OF ENDOMETRIUM: ICD-10-CM

## 2025-04-01 ENCOUNTER — APPOINTMENT (OUTPATIENT)
Dept: INFUSION THERAPY | Facility: CLINIC | Age: 70
End: 2025-04-01
Payer: MEDICARE

## 2025-04-01 ENCOUNTER — RESULT REVIEW (OUTPATIENT)
Age: 70
End: 2025-04-01

## 2025-04-01 ENCOUNTER — APPOINTMENT (OUTPATIENT)
Dept: HEMATOLOGY ONCOLOGY | Facility: CLINIC | Age: 70
End: 2025-04-01
Payer: MEDICARE

## 2025-04-01 VITALS
DIASTOLIC BLOOD PRESSURE: 84 MMHG | SYSTOLIC BLOOD PRESSURE: 167 MMHG | TEMPERATURE: 97.3 F | HEIGHT: 59 IN | BODY MASS INDEX: 24.39 KG/M2 | WEIGHT: 121 LBS | OXYGEN SATURATION: 100 % | HEART RATE: 103 BPM

## 2025-04-01 DIAGNOSIS — Z51.12 ENCOUNTER FOR ANTINEOPLASTIC IMMUNOTHERAPY: ICD-10-CM

## 2025-04-01 DIAGNOSIS — R59.0 LOCALIZED ENLARGED LYMPH NODES: ICD-10-CM

## 2025-04-01 DIAGNOSIS — C54.1 MALIGNANT NEOPLASM OF ENDOMETRIUM: ICD-10-CM

## 2025-04-01 DIAGNOSIS — R11.2 NAUSEA WITH VOMITING, UNSPECIFIED: ICD-10-CM

## 2025-04-01 PROCEDURE — 99214 OFFICE O/P EST MOD 30 MIN: CPT

## 2025-04-07 ENCOUNTER — OUTPATIENT (OUTPATIENT)
Dept: OUTPATIENT SERVICES | Facility: HOSPITAL | Age: 70
LOS: 1 days | End: 2025-04-07
Payer: MEDICARE

## 2025-04-07 ENCOUNTER — APPOINTMENT (OUTPATIENT)
Dept: CT IMAGING | Facility: CLINIC | Age: 70
End: 2025-04-07
Payer: MEDICARE

## 2025-04-07 DIAGNOSIS — C54.1 MALIGNANT NEOPLASM OF ENDOMETRIUM: ICD-10-CM

## 2025-04-07 PROCEDURE — 70491 CT SOFT TISSUE NECK W/DYE: CPT | Mod: 26

## 2025-04-07 PROCEDURE — 71260 CT THORAX DX C+: CPT | Mod: 26

## 2025-04-07 PROCEDURE — 74177 CT ABD & PELVIS W/CONTRAST: CPT

## 2025-04-07 PROCEDURE — 70491 CT SOFT TISSUE NECK W/DYE: CPT

## 2025-04-07 PROCEDURE — 71260 CT THORAX DX C+: CPT

## 2025-04-07 PROCEDURE — 74177 CT ABD & PELVIS W/CONTRAST: CPT | Mod: 26

## 2025-04-22 ENCOUNTER — APPOINTMENT (OUTPATIENT)
Dept: HEMATOLOGY ONCOLOGY | Facility: CLINIC | Age: 70
End: 2025-04-22
Payer: MEDICARE

## 2025-04-22 ENCOUNTER — APPOINTMENT (OUTPATIENT)
Dept: INFUSION THERAPY | Facility: CLINIC | Age: 70
End: 2025-04-22

## 2025-04-22 VITALS
DIASTOLIC BLOOD PRESSURE: 84 MMHG | OXYGEN SATURATION: 100 % | TEMPERATURE: 98.1 F | SYSTOLIC BLOOD PRESSURE: 171 MMHG | HEART RATE: 105 BPM | HEIGHT: 59 IN | WEIGHT: 124 LBS | BODY MASS INDEX: 25 KG/M2

## 2025-04-22 DIAGNOSIS — Z23 ENCOUNTER FOR IMMUNIZATION: ICD-10-CM

## 2025-04-22 DIAGNOSIS — C54.1 MALIGNANT NEOPLASM OF ENDOMETRIUM: ICD-10-CM

## 2025-04-22 PROCEDURE — 99215 OFFICE O/P EST HI 40 MIN: CPT

## 2025-04-25 ENCOUNTER — NON-APPOINTMENT (OUTPATIENT)
Age: 70
End: 2025-04-25

## 2025-04-30 ENCOUNTER — RESULT REVIEW (OUTPATIENT)
Age: 70
End: 2025-04-30

## 2025-04-30 ENCOUNTER — OUTPATIENT (OUTPATIENT)
Dept: INPATIENT UNIT | Facility: HOSPITAL | Age: 70
LOS: 1 days | Discharge: HOME | End: 2025-04-30
Payer: MEDICARE

## 2025-04-30 ENCOUNTER — TRANSCRIPTION ENCOUNTER (OUTPATIENT)
Age: 70
End: 2025-04-30

## 2025-04-30 VITALS
RESPIRATION RATE: 16 BRPM | OXYGEN SATURATION: 100 % | HEART RATE: 103 BPM | HEIGHT: 60 IN | TEMPERATURE: 98 F | DIASTOLIC BLOOD PRESSURE: 83 MMHG | WEIGHT: 115.08 LBS | SYSTOLIC BLOOD PRESSURE: 158 MMHG

## 2025-04-30 VITALS
RESPIRATION RATE: 18 BRPM | DIASTOLIC BLOOD PRESSURE: 79 MMHG | OXYGEN SATURATION: 99 % | TEMPERATURE: 98 F | HEART RATE: 98 BPM | SYSTOLIC BLOOD PRESSURE: 156 MMHG

## 2025-04-30 DIAGNOSIS — C54.1 MALIGNANT NEOPLASM OF ENDOMETRIUM: ICD-10-CM

## 2025-04-30 DIAGNOSIS — R59.0 LOCALIZED ENLARGED LYMPH NODES: ICD-10-CM

## 2025-04-30 LAB — GLUCOSE BLDC GLUCOMTR-MCNC: 238 MG/DL — HIGH (ref 70–99)

## 2025-04-30 PROCEDURE — 82962 GLUCOSE BLOOD TEST: CPT

## 2025-04-30 PROCEDURE — 88381 MICRODISSECTION MANUAL: CPT

## 2025-04-30 PROCEDURE — 88342 IMHCHEM/IMCYTCHM 1ST ANTB: CPT | Mod: 26

## 2025-04-30 PROCEDURE — 38505 NEEDLE BIOPSY LYMPH NODES: CPT

## 2025-04-30 PROCEDURE — 81455 SO/HL 51/>GSAP DNA/DNA&RNA: CPT

## 2025-04-30 PROCEDURE — 88341 IMHCHEM/IMCYTCHM EA ADD ANTB: CPT | Mod: 26

## 2025-04-30 PROCEDURE — 88305 TISSUE EXAM BY PATHOLOGIST: CPT

## 2025-04-30 PROCEDURE — 88305 TISSUE EXAM BY PATHOLOGIST: CPT | Mod: 26

## 2025-04-30 PROCEDURE — 88341 IMHCHEM/IMCYTCHM EA ADD ANTB: CPT

## 2025-04-30 PROCEDURE — 88342 IMHCHEM/IMCYTCHM 1ST ANTB: CPT

## 2025-04-30 PROCEDURE — 76942 ECHO GUIDE FOR BIOPSY: CPT | Mod: 26

## 2025-04-30 PROCEDURE — 38505 NEEDLE BIOPSY LYMPH NODES: CPT | Mod: LT

## 2025-04-30 PROCEDURE — 76942 ECHO GUIDE FOR BIOPSY: CPT

## 2025-04-30 PROCEDURE — 88172 CYTP DX EVAL FNA 1ST EA SITE: CPT

## 2025-04-30 NOTE — ASU DISCHARGE PLAN (ADULT/PEDIATRIC) - FINANCIAL ASSISTANCE
[facility] provides services at a reduced cost to those who are determined to be eligible through [facility]’s financial assistance program. Information regarding [facility]’s financial assistance program can be found by going to [email or web address] or by calling [phone].

## 2025-04-30 NOTE — ASU PATIENT PROFILE, ADULT - NSICDXPASTMEDICALHX_GEN_ALL_CORE_FT
PAST MEDICAL HISTORY:  Anxiety     Anxiety     B12 deficiency     Chronic GERD     Diabetes     Endometrial cancer     GERD (gastroesophageal reflux disease)     Lymphadenopathy, axillary     Malignant neoplasm of endometrium     Psoriatic arthritis     Psychophysiological insomnia     Vitamin D deficiency

## 2025-05-02 PROBLEM — E11.9 TYPE 2 DIABETES MELLITUS WITHOUT COMPLICATIONS: Chronic | Status: ACTIVE | Noted: 2025-04-30

## 2025-05-02 PROBLEM — L40.50 ARTHROPATHIC PSORIASIS, UNSPECIFIED: Chronic | Status: ACTIVE | Noted: 2025-04-30

## 2025-05-05 ENCOUNTER — NON-APPOINTMENT (OUTPATIENT)
Age: 70
End: 2025-05-05

## 2025-05-05 DIAGNOSIS — R59.0 LOCALIZED ENLARGED LYMPH NODES: ICD-10-CM

## 2025-05-05 LAB — SURGICAL PATHOLOGY STUDY: SIGNIFICANT CHANGE UP

## 2025-05-12 ENCOUNTER — APPOINTMENT (OUTPATIENT)
Dept: HEMATOLOGY ONCOLOGY | Facility: CLINIC | Age: 70
End: 2025-05-12
Payer: MEDICARE

## 2025-05-12 VITALS
HEIGHT: 59 IN | DIASTOLIC BLOOD PRESSURE: 86 MMHG | SYSTOLIC BLOOD PRESSURE: 157 MMHG | TEMPERATURE: 98.3 F | OXYGEN SATURATION: 100 % | HEART RATE: 111 BPM | BODY MASS INDEX: 25.4 KG/M2 | WEIGHT: 126 LBS

## 2025-05-12 DIAGNOSIS — R59.0 LOCALIZED ENLARGED LYMPH NODES: ICD-10-CM

## 2025-05-12 PROCEDURE — 99215 OFFICE O/P EST HI 40 MIN: CPT

## 2025-05-13 ENCOUNTER — RESULT REVIEW (OUTPATIENT)
Age: 70
End: 2025-05-13

## 2025-05-13 ENCOUNTER — RESULT CHARGE (OUTPATIENT)
Age: 70
End: 2025-05-13

## 2025-05-13 ENCOUNTER — APPOINTMENT (OUTPATIENT)
Dept: INFUSION THERAPY | Facility: CLINIC | Age: 70
End: 2025-05-13

## 2025-05-13 VITALS
HEART RATE: 90 BPM | BODY MASS INDEX: 25.4 KG/M2 | HEIGHT: 59 IN | DIASTOLIC BLOOD PRESSURE: 77 MMHG | WEIGHT: 126 LBS | OXYGEN SATURATION: 99 % | TEMPERATURE: 97.9 F | SYSTOLIC BLOOD PRESSURE: 159 MMHG

## 2025-05-13 LAB
ALBUMIN SERPL ELPH-MCNC: 4 G/DL — SIGNIFICANT CHANGE UP (ref 3.3–5)
ALP SERPL-CCNC: 118 U/L — SIGNIFICANT CHANGE UP (ref 40–120)
ALT FLD-CCNC: 21 U/L — SIGNIFICANT CHANGE UP (ref 10–40)
ANION GAP SERPL CALC-SCNC: 15 MMOL/L — SIGNIFICANT CHANGE UP (ref 5–17)
AST SERPL-CCNC: 20 U/L — SIGNIFICANT CHANGE UP (ref 10–35)
BASOPHILS # BLD AUTO: 0.04 K/UL — SIGNIFICANT CHANGE UP (ref 0–0.2)
BASOPHILS NFR BLD AUTO: 0.5 % — SIGNIFICANT CHANGE UP (ref 0–2)
BILIRUB SERPL-MCNC: 0.7 MG/DL — SIGNIFICANT CHANGE UP (ref 0.2–1.2)
BUN SERPL-MCNC: 13 MG/DL — SIGNIFICANT CHANGE UP (ref 7–23)
CALCIUM SERPL-MCNC: 9.3 MG/DL — SIGNIFICANT CHANGE UP (ref 8.4–10.5)
CANCER AG125 SERPL-ACNC: 95 U/ML — HIGH
CHLORIDE SERPL-SCNC: 104 MMOL/L — SIGNIFICANT CHANGE UP (ref 96–108)
CO2 SERPL-SCNC: 23 MMOL/L — SIGNIFICANT CHANGE UP (ref 22–31)
CREAT SERPL-MCNC: 0.5 MG/DL — SIGNIFICANT CHANGE UP (ref 0.5–1.3)
EGFR: 101 ML/MIN/1.73M2 — SIGNIFICANT CHANGE UP
EGFR: 101 ML/MIN/1.73M2 — SIGNIFICANT CHANGE UP
EOSINOPHIL # BLD AUTO: 0.05 K/UL — SIGNIFICANT CHANGE UP (ref 0–0.5)
EOSINOPHIL NFR BLD AUTO: 0.7 % — SIGNIFICANT CHANGE UP (ref 0–6)
GLUCOSE SERPL-MCNC: 72 MG/DL — SIGNIFICANT CHANGE UP (ref 70–99)
HCT VFR BLD CALC: 39.6 % — SIGNIFICANT CHANGE UP (ref 34.5–45)
HGB BLD-MCNC: 13.6 G/DL — SIGNIFICANT CHANGE UP (ref 11.5–15.5)
IMM GRANULOCYTES NFR BLD AUTO: 0.3 % — SIGNIFICANT CHANGE UP (ref 0–0.9)
LYMPHOCYTES # BLD AUTO: 0.78 K/UL — LOW (ref 1–3.3)
LYMPHOCYTES # BLD AUTO: 10.1 % — LOW (ref 13–44)
MCHC RBC-ENTMCNC: 31.6 PG — SIGNIFICANT CHANGE UP (ref 27–34)
MCHC RBC-ENTMCNC: 34.3 G/DL — SIGNIFICANT CHANGE UP (ref 32–36)
MCV RBC AUTO: 91.9 FL — SIGNIFICANT CHANGE UP (ref 80–100)
MONOCYTES # BLD AUTO: 0.53 K/UL — SIGNIFICANT CHANGE UP (ref 0–0.9)
MONOCYTES NFR BLD AUTO: 6.9 % — SIGNIFICANT CHANGE UP (ref 2–14)
NEUTROPHILS # BLD AUTO: 6.27 K/UL — SIGNIFICANT CHANGE UP (ref 1.8–7.4)
NEUTROPHILS NFR BLD AUTO: 81.5 % — HIGH (ref 43–77)
NRBC BLD AUTO-RTO: 0 /100 WBCS — SIGNIFICANT CHANGE UP (ref 0–0)
PLATELET # BLD AUTO: 335 K/UL — SIGNIFICANT CHANGE UP (ref 150–400)
POTASSIUM SERPL-MCNC: 4.1 MMOL/L — SIGNIFICANT CHANGE UP (ref 3.5–5.3)
POTASSIUM SERPL-SCNC: 4.1 MMOL/L — SIGNIFICANT CHANGE UP (ref 3.5–5.3)
PROT SERPL-MCNC: 6.8 G/DL — SIGNIFICANT CHANGE UP (ref 6–8.3)
RBC # BLD: 4.31 M/UL — SIGNIFICANT CHANGE UP (ref 3.8–5.2)
RBC # FLD: 13.2 % — SIGNIFICANT CHANGE UP (ref 10.3–14.5)
SODIUM SERPL-SCNC: 141 MMOL/L — SIGNIFICANT CHANGE UP (ref 135–145)
WBC # BLD: 7.69 K/UL — SIGNIFICANT CHANGE UP (ref 3.8–10.5)
WBC # FLD AUTO: 7.69 K/UL — SIGNIFICANT CHANGE UP (ref 3.8–10.5)

## 2025-05-19 ENCOUNTER — APPOINTMENT (OUTPATIENT)
Dept: RADIATION ONCOLOGY | Facility: CLINIC | Age: 70
End: 2025-05-19
Payer: MEDICARE

## 2025-05-19 DIAGNOSIS — C54.1 MALIGNANT NEOPLASM OF ENDOMETRIUM: ICD-10-CM

## 2025-05-19 PROCEDURE — 99204 OFFICE O/P NEW MOD 45 MIN: CPT

## 2025-05-19 RX ORDER — ROSUVASTATIN CALCIUM 5 MG/1
5 TABLET, FILM COATED ORAL
Qty: 90 | Refills: 0 | Status: ACTIVE | COMMUNITY
Start: 2025-01-31

## 2025-05-19 RX ORDER — PEN NEEDLE, DIABETIC 32GX 5/32"
32G X 4 MM NEEDLE, DISPOSABLE MISCELLANEOUS
Qty: 400 | Refills: 0 | Status: ACTIVE | COMMUNITY
Start: 2025-03-04

## 2025-05-19 RX ORDER — INSULIN LISPRO 100 [IU]/ML
100 INJECTION, SOLUTION INTRAVENOUS; SUBCUTANEOUS
Qty: 15 | Refills: 0 | Status: ACTIVE | COMMUNITY
Start: 2024-11-12

## 2025-05-23 PROCEDURE — 77263 THER RADIOLOGY TX PLNG CPLX: CPT

## 2025-05-23 PROCEDURE — 77334 RADIATION TREATMENT AID(S): CPT

## 2025-05-29 ENCOUNTER — OUTPATIENT (OUTPATIENT)
Dept: OUTPATIENT SERVICES | Facility: HOSPITAL | Age: 70
LOS: 1 days | Discharge: HOME | End: 2025-05-29

## 2025-05-29 DIAGNOSIS — Z51.12 ENCOUNTER FOR ANTINEOPLASTIC IMMUNOTHERAPY: ICD-10-CM

## 2025-05-29 DIAGNOSIS — C54.1 MALIGNANT NEOPLASM OF ENDOMETRIUM: ICD-10-CM

## 2025-06-03 ENCOUNTER — APPOINTMENT (OUTPATIENT)
Dept: INFUSION THERAPY | Facility: CLINIC | Age: 70
End: 2025-06-03

## 2025-06-03 VITALS
HEIGHT: 59 IN | DIASTOLIC BLOOD PRESSURE: 84 MMHG | OXYGEN SATURATION: 100 % | WEIGHT: 124 LBS | SYSTOLIC BLOOD PRESSURE: 164 MMHG | BODY MASS INDEX: 25 KG/M2 | HEART RATE: 104 BPM | TEMPERATURE: 98.1 F

## 2025-06-05 PROCEDURE — 77301 RADIOTHERAPY DOSE PLAN IMRT: CPT

## 2025-06-05 PROCEDURE — 77338 DESIGN MLC DEVICE FOR IMRT: CPT

## 2025-06-05 PROCEDURE — 77300 RADIATION THERAPY DOSE PLAN: CPT

## 2025-06-11 PROCEDURE — 77427 RADIATION TX MANAGEMENT X5: CPT

## 2025-06-11 PROCEDURE — 77014: CPT

## 2025-06-11 PROCEDURE — G6015: CPT

## 2025-06-12 PROCEDURE — G6015: CPT

## 2025-06-12 PROCEDURE — 77014: CPT

## 2025-06-13 PROCEDURE — 77014: CPT

## 2025-06-13 PROCEDURE — G6015: CPT

## 2025-06-16 ENCOUNTER — NON-APPOINTMENT (OUTPATIENT)
Age: 70
End: 2025-06-16

## 2025-06-16 PROCEDURE — G6015: CPT

## 2025-06-16 PROCEDURE — 77014: CPT

## 2025-06-17 ENCOUNTER — NON-APPOINTMENT (OUTPATIENT)
Age: 70
End: 2025-06-17

## 2025-06-17 VITALS
HEART RATE: 96 BPM | RESPIRATION RATE: 18 BRPM | DIASTOLIC BLOOD PRESSURE: 65 MMHG | BODY MASS INDEX: 25.8 KG/M2 | WEIGHT: 128 LBS | SYSTOLIC BLOOD PRESSURE: 150 MMHG | HEIGHT: 59 IN

## 2025-06-17 PROCEDURE — 77014: CPT

## 2025-06-17 PROCEDURE — 77336 RADIATION PHYSICS CONSULT: CPT

## 2025-06-17 PROCEDURE — G6015: CPT

## 2025-06-18 PROCEDURE — 77014: CPT

## 2025-06-18 PROCEDURE — 77427 RADIATION TX MANAGEMENT X5: CPT

## 2025-06-18 PROCEDURE — G6015: CPT

## 2025-06-19 PROCEDURE — G6015: CPT

## 2025-06-19 PROCEDURE — 77014: CPT

## 2025-06-20 PROCEDURE — 77014: CPT

## 2025-06-20 PROCEDURE — G6015: CPT

## 2025-06-23 PROCEDURE — 77014: CPT

## 2025-06-23 PROCEDURE — G6015: CPT

## 2025-06-24 ENCOUNTER — APPOINTMENT (OUTPATIENT)
Dept: INFUSION THERAPY | Facility: CLINIC | Age: 70
End: 2025-06-24

## 2025-06-24 ENCOUNTER — NON-APPOINTMENT (OUTPATIENT)
Age: 70
End: 2025-06-24

## 2025-06-24 PROCEDURE — 77014: CPT

## 2025-06-24 PROCEDURE — 77336 RADIATION PHYSICS CONSULT: CPT

## 2025-06-24 PROCEDURE — G6015: CPT

## 2025-06-25 PROCEDURE — G6015: CPT

## 2025-06-25 PROCEDURE — 77014: CPT

## 2025-06-25 PROCEDURE — 77427 RADIATION TX MANAGEMENT X5: CPT

## 2025-06-26 PROCEDURE — G6015: CPT

## 2025-06-26 PROCEDURE — 77014: CPT

## 2025-06-27 PROCEDURE — 77014: CPT

## 2025-06-27 PROCEDURE — G6015: CPT

## 2025-06-30 PROCEDURE — G6015: CPT

## 2025-06-30 PROCEDURE — 77014: CPT

## 2025-07-01 ENCOUNTER — NON-APPOINTMENT (OUTPATIENT)
Age: 70
End: 2025-07-01

## 2025-07-01 PROCEDURE — G6015: CPT

## 2025-07-01 PROCEDURE — 77014: CPT

## 2025-07-01 PROCEDURE — 77336 RADIATION PHYSICS CONSULT: CPT

## 2025-07-15 ENCOUNTER — RESULT REVIEW (OUTPATIENT)
Age: 70
End: 2025-07-15

## 2025-07-15 ENCOUNTER — APPOINTMENT (OUTPATIENT)
Dept: INFUSION THERAPY | Facility: CLINIC | Age: 70
End: 2025-07-15

## 2025-07-15 LAB
ALBUMIN SERPL ELPH-MCNC: 4.2 G/DL — SIGNIFICANT CHANGE UP (ref 3.3–5)
ALP SERPL-CCNC: 90 U/L — SIGNIFICANT CHANGE UP (ref 40–120)
ALT FLD-CCNC: 24 U/L — SIGNIFICANT CHANGE UP (ref 10–40)
ANION GAP SERPL CALC-SCNC: 17 MMOL/L — SIGNIFICANT CHANGE UP (ref 5–17)
AST SERPL-CCNC: 20 U/L — SIGNIFICANT CHANGE UP (ref 10–35)
BASOPHILS # BLD AUTO: 0.02 K/UL — SIGNIFICANT CHANGE UP (ref 0–0.2)
BASOPHILS NFR BLD AUTO: 0.2 % — SIGNIFICANT CHANGE UP (ref 0–2)
BILIRUB SERPL-MCNC: 0.7 MG/DL — SIGNIFICANT CHANGE UP (ref 0.2–1.2)
BUN SERPL-MCNC: 12 MG/DL — SIGNIFICANT CHANGE UP (ref 7–23)
CALCIUM SERPL-MCNC: 9.1 MG/DL — SIGNIFICANT CHANGE UP (ref 8.4–10.5)
CANCER AG125 SERPL-ACNC: 112 U/ML — HIGH
CHLORIDE SERPL-SCNC: 103 MMOL/L — SIGNIFICANT CHANGE UP (ref 96–108)
CO2 SERPL-SCNC: 21 MMOL/L — LOW (ref 22–31)
CREAT ?TM UR-MCNC: 73 MG/DL — SIGNIFICANT CHANGE UP
CREAT SERPL-MCNC: 0.55 MG/DL — SIGNIFICANT CHANGE UP (ref 0.5–1.3)
EGFR: 99 ML/MIN/1.73M2 — SIGNIFICANT CHANGE UP
EGFR: 99 ML/MIN/1.73M2 — SIGNIFICANT CHANGE UP
EOSINOPHIL # BLD AUTO: 0.07 K/UL — SIGNIFICANT CHANGE UP (ref 0–0.5)
EOSINOPHIL NFR BLD AUTO: 0.8 % — SIGNIFICANT CHANGE UP (ref 0–6)
GLUCOSE SERPL-MCNC: 82 MG/DL — SIGNIFICANT CHANGE UP (ref 70–99)
HCT VFR BLD CALC: 42.8 % — SIGNIFICANT CHANGE UP (ref 34.5–45)
HGB BLD-MCNC: 14.3 G/DL — SIGNIFICANT CHANGE UP (ref 11.5–15.5)
IMM GRANULOCYTES # BLD AUTO: 0.04 K/UL — SIGNIFICANT CHANGE UP (ref 0–0.07)
IMM GRANULOCYTES NFR BLD AUTO: 0.4 % — SIGNIFICANT CHANGE UP (ref 0–0.9)
LYMPHOCYTES # BLD AUTO: 0.5 K/UL — LOW (ref 1–3.3)
LYMPHOCYTES NFR BLD AUTO: 5.5 % — LOW (ref 13–44)
MCHC RBC-ENTMCNC: 31.6 PG — SIGNIFICANT CHANGE UP (ref 27–34)
MCHC RBC-ENTMCNC: 33.4 G/DL — SIGNIFICANT CHANGE UP (ref 32–36)
MCV RBC AUTO: 94.7 FL — SIGNIFICANT CHANGE UP (ref 80–100)
MONOCYTES # BLD AUTO: 0.66 K/UL — SIGNIFICANT CHANGE UP (ref 0–0.9)
MONOCYTES NFR BLD AUTO: 7.3 % — SIGNIFICANT CHANGE UP (ref 2–14)
NEUTROPHILS # BLD AUTO: 7.76 K/UL — HIGH (ref 1.8–7.4)
NEUTROPHILS NFR BLD AUTO: 85.8 % — HIGH (ref 43–77)
NRBC # BLD AUTO: 0 K/UL — SIGNIFICANT CHANGE UP (ref 0–0)
NRBC # FLD: 0 K/UL — SIGNIFICANT CHANGE UP (ref 0–0)
NRBC BLD AUTO-RTO: 0 /100 WBCS — SIGNIFICANT CHANGE UP (ref 0–0)
PLATELET # BLD AUTO: 319 K/UL — SIGNIFICANT CHANGE UP (ref 150–400)
PMV BLD: 8.6 FL — SIGNIFICANT CHANGE UP (ref 7–13)
POTASSIUM SERPL-MCNC: 4.4 MMOL/L — SIGNIFICANT CHANGE UP (ref 3.5–5.3)
POTASSIUM SERPL-SCNC: 4.4 MMOL/L — SIGNIFICANT CHANGE UP (ref 3.5–5.3)
PROT ?TM UR-MCNC: 23 MG/DL — HIGH (ref 0–12)
PROT SERPL-MCNC: 6.8 G/DL — SIGNIFICANT CHANGE UP (ref 6–8.3)
PROT/CREAT UR-RTO: 0.3 RATIO — HIGH (ref 0–0.2)
RBC # BLD: 4.52 M/UL — SIGNIFICANT CHANGE UP (ref 3.8–5.2)
RBC # FLD: 14.2 % — SIGNIFICANT CHANGE UP (ref 10.3–14.5)
SODIUM SERPL-SCNC: 140 MMOL/L — SIGNIFICANT CHANGE UP (ref 135–145)
T4 FREE+ TSH PNL SERPL: 1.03 UIU/ML — SIGNIFICANT CHANGE UP (ref 0.27–4.2)
WBC # BLD: 9.05 K/UL — SIGNIFICANT CHANGE UP (ref 3.8–10.5)
WBC # FLD AUTO: 9.05 K/UL — SIGNIFICANT CHANGE UP (ref 3.8–10.5)

## 2025-08-04 ENCOUNTER — APPOINTMENT (OUTPATIENT)
Dept: RADIATION ONCOLOGY | Facility: CLINIC | Age: 70
End: 2025-08-04
Payer: MEDICARE

## 2025-08-04 VITALS
HEART RATE: 94 BPM | OXYGEN SATURATION: 98 % | RESPIRATION RATE: 16 BRPM | WEIGHT: 126 LBS | DIASTOLIC BLOOD PRESSURE: 80 MMHG | BODY MASS INDEX: 25.4 KG/M2 | SYSTOLIC BLOOD PRESSURE: 141 MMHG | HEIGHT: 59 IN

## 2025-08-04 DIAGNOSIS — C76.0 MALIGNANT NEOPLASM OF HEAD, FACE AND NECK: ICD-10-CM

## 2025-08-04 PROCEDURE — 99024 POSTOP FOLLOW-UP VISIT: CPT

## 2025-08-04 RX ORDER — CLOBETASOL PROPIONATE 0.5 MG/G
0.05 OINTMENT TOPICAL
Qty: 45 | Refills: 0 | Status: ACTIVE | COMMUNITY
Start: 2025-01-06

## 2025-08-05 ENCOUNTER — APPOINTMENT (OUTPATIENT)
Dept: INFUSION THERAPY | Facility: CLINIC | Age: 70
End: 2025-08-05

## 2025-08-05 VITALS
SYSTOLIC BLOOD PRESSURE: 165 MMHG | DIASTOLIC BLOOD PRESSURE: 82 MMHG | TEMPERATURE: 98.1 F | BODY MASS INDEX: 5.05 KG/M2 | WEIGHT: 25 LBS | OXYGEN SATURATION: 98 % | HEART RATE: 112 BPM

## 2025-08-05 VITALS — WEIGHT: 125 LBS | BODY MASS INDEX: 25.25 KG/M2

## 2025-08-26 ENCOUNTER — RESULT REVIEW (OUTPATIENT)
Age: 70
End: 2025-08-26

## 2025-08-26 ENCOUNTER — APPOINTMENT (OUTPATIENT)
Dept: HEMATOLOGY ONCOLOGY | Facility: CLINIC | Age: 70
End: 2025-08-26

## 2025-08-26 ENCOUNTER — APPOINTMENT (OUTPATIENT)
Dept: INFUSION THERAPY | Facility: CLINIC | Age: 70
End: 2025-08-26

## 2025-08-26 VITALS — TEMPERATURE: 97.1 F | OXYGEN SATURATION: 99 % | HEART RATE: 87 BPM | BODY MASS INDEX: 26.05 KG/M2 | WEIGHT: 129 LBS

## 2025-08-26 VITALS — SYSTOLIC BLOOD PRESSURE: 160 MMHG | DIASTOLIC BLOOD PRESSURE: 82 MMHG

## 2025-09-10 ENCOUNTER — APPOINTMENT (OUTPATIENT)
Dept: HEMATOLOGY ONCOLOGY | Facility: CLINIC | Age: 70
End: 2025-09-10

## 2025-09-10 VITALS
OXYGEN SATURATION: 100 % | HEART RATE: 80 BPM | DIASTOLIC BLOOD PRESSURE: 82 MMHG | TEMPERATURE: 97 F | WEIGHT: 130 LBS | SYSTOLIC BLOOD PRESSURE: 161 MMHG | BODY MASS INDEX: 26.26 KG/M2

## 2025-09-16 ENCOUNTER — APPOINTMENT (OUTPATIENT)
Dept: INFUSION THERAPY | Facility: CLINIC | Age: 70
End: 2025-09-16

## 2025-09-16 ENCOUNTER — NON-APPOINTMENT (OUTPATIENT)
Age: 70
End: 2025-09-16

## 2025-09-17 ENCOUNTER — APPOINTMENT (OUTPATIENT)
Dept: DERMATOLOGY | Facility: CLINIC | Age: 70
End: 2025-09-17
Payer: MEDICARE

## 2025-09-17 DIAGNOSIS — L40.0 PSORIASIS VULGARIS: ICD-10-CM

## 2025-09-17 DIAGNOSIS — Z79.899 OTHER LONG TERM (CURRENT) DRUG THERAPY: ICD-10-CM

## 2025-09-17 PROCEDURE — 99204 OFFICE O/P NEW MOD 45 MIN: CPT

## 2025-09-17 RX ORDER — APREMILAST 30 MG/1
30 TABLET, FILM COATED ORAL
Qty: 60 | Refills: 5 | Status: ACTIVE | COMMUNITY
Start: 2025-09-17 | End: 1900-01-01

## 2025-09-17 RX ORDER — TRIAMCINOLONE ACETONIDE 1 MG/G
0.1 CREAM TOPICAL
Qty: 1 | Refills: 3 | Status: ACTIVE | COMMUNITY
Start: 2025-09-17 | End: 1900-01-01